# Patient Record
Sex: FEMALE | Race: WHITE | Employment: UNEMPLOYED | RURAL
[De-identification: names, ages, dates, MRNs, and addresses within clinical notes are randomized per-mention and may not be internally consistent; named-entity substitution may affect disease eponyms.]

---

## 2017-05-01 ENCOUNTER — HOSPITAL ENCOUNTER (OUTPATIENT)
Dept: GENERAL RADIOLOGY | Age: 67
Discharge: HOME | End: 2017-05-01
Attending: NURSE PRACTITIONER | Admitting: NURSE PRACTITIONER

## 2017-05-01 DIAGNOSIS — M79.609 PAIN IN EXTREMITY: ICD-10-CM

## 2017-05-01 DIAGNOSIS — M79.604 PAIN OF RIGHT LOWER EXTREMITY: ICD-10-CM

## 2018-05-08 ENCOUNTER — HOSPITAL ENCOUNTER (OUTPATIENT)
Dept: OTHER | Age: 68
Discharge: HOME | End: 2018-05-08
Attending: NURSE PRACTITIONER | Admitting: NURSE PRACTITIONER

## 2018-05-08 DIAGNOSIS — J20.9 ACUTE BRONCHITIS, UNSPECIFIED ORGANISM: ICD-10-CM

## 2020-02-12 ENCOUNTER — APPOINTMENT (OUTPATIENT)
Dept: ULTRASOUND IMAGING | Facility: HOSPITAL | Age: 70
End: 2020-02-12

## 2020-02-12 ENCOUNTER — HOSPITAL ENCOUNTER (EMERGENCY)
Facility: HOSPITAL | Age: 70
Discharge: HOME | End: 2020-02-12
Attending: EMERGENCY MEDICINE | Admitting: EMERGENCY MEDICINE

## 2020-02-12 ENCOUNTER — HOSPITAL ENCOUNTER (INPATIENT)
Facility: HOSPITAL | Age: 70
LOS: 3 days | Discharge: NURSING, REHAB OR LONG-TERM CARE | DRG: 690 | End: 2020-02-15
Attending: EMERGENCY MEDICINE | Admitting: INTERNAL MEDICINE
Payer: MEDICAID

## 2020-02-12 VITALS
TEMPERATURE: 97.7 F | HEART RATE: 87 BPM | OXYGEN SATURATION: 98 % | SYSTOLIC BLOOD PRESSURE: 147 MMHG | BODY MASS INDEX: 50.02 KG/M2 | DIASTOLIC BLOOD PRESSURE: 85 MMHG | HEIGHT: 64 IN | RESPIRATION RATE: 24 BRPM | WEIGHT: 293 LBS

## 2020-02-12 DIAGNOSIS — M79.89 LEG SWELLING: ICD-10-CM

## 2020-02-12 DIAGNOSIS — N30.00 ACUTE CYSTITIS WITHOUT HEMATURIA: Primary | ICD-10-CM

## 2020-02-12 PROBLEM — N39.0 UTI (URINARY TRACT INFECTION): Status: ACTIVE | Noted: 2020-02-12

## 2020-02-12 LAB
A/G RATIO: 0.6 (ref 0.8–2)
ALBUMIN SERPL-MCNC: 3.2 G/DL (ref 3.4–4.8)
ALBUMIN SERPL-MCNC: 3.4 G/DL (ref 3.5–5.2)
ALBUMIN/GLOB SERPL: 0.6 G/DL
ALP BLD-CCNC: 95 U/L (ref 25–100)
ALP SERPL-CCNC: 94 U/L (ref 39–117)
ALT SERPL W P-5'-P-CCNC: 14 U/L (ref 1–33)
ALT SERPL-CCNC: 14 U/L (ref 4–36)
ANION GAP SERPL CALCULATED.3IONS-SCNC: 10 MMOL/L (ref 3–16)
ANION GAP SERPL CALCULATED.3IONS-SCNC: 11.2 MMOL/L (ref 5–15)
AST SERPL-CCNC: 24 U/L (ref 1–32)
AST SERPL-CCNC: 38 U/L (ref 8–33)
BACTERIA UR QL AUTO: ABNORMAL /HPF
BACTERIA: ABNORMAL /HPF
BASOPHILS # BLD AUTO: 0.07 10*3/MM3 (ref 0–0.2)
BASOPHILS NFR BLD AUTO: 0.7 % (ref 0–1.5)
BILIRUB SERPL-MCNC: 0.4 MG/DL (ref 0.2–1.2)
BILIRUB SERPL-MCNC: 0.4 MG/DL (ref 0.3–1.2)
BILIRUB UR QL STRIP: NEGATIVE
BILIRUBIN URINE: NEGATIVE
BLOOD, URINE: ABNORMAL
BUN BLD-MCNC: 30 MG/DL (ref 8–23)
BUN BLDV-MCNC: 29 MG/DL (ref 6–20)
BUN/CREAT SERPL: 30.6 (ref 7–25)
CALCIUM SERPL-MCNC: 8.5 MG/DL (ref 8.5–10.5)
CALCIUM SPEC-SCNC: 9 MG/DL (ref 8.6–10.5)
CHLORIDE BLD-SCNC: 100 MMOL/L (ref 98–107)
CHLORIDE SERPL-SCNC: 101 MMOL/L (ref 98–107)
CLARITY UR: ABNORMAL
CLARITY: ABNORMAL
CO2 SERPL-SCNC: 25.8 MMOL/L (ref 22–29)
CO2: 27 MMOL/L (ref 20–30)
COLOR UR: YELLOW
COLOR: YELLOW
CREAT BLD-MCNC: 0.98 MG/DL (ref 0.57–1)
CREAT SERPL-MCNC: 1 MG/DL (ref 0.4–1.2)
DEPRECATED RDW RBC AUTO: 49.9 FL (ref 37–54)
EOSINOPHIL # BLD AUTO: 0.36 10*3/MM3 (ref 0–0.4)
EOSINOPHIL NFR BLD AUTO: 3.5 % (ref 0.3–6.2)
EPITHELIAL CELLS, UA: ABNORMAL /HPF
ERYTHROCYTE [DISTWIDTH] IN BLOOD BY AUTOMATED COUNT: 14.9 % (ref 12.3–15.4)
GFR AFRICAN AMERICAN: >59
GFR NON-AFRICAN AMERICAN: 55
GFR SERPL CREATININE-BSD FRML MDRD: 57 ML/MIN/1.73
GLOBULIN UR ELPH-MCNC: 5.5 GM/DL
GLOBULIN: 5.2 G/DL
GLUCOSE BLD-MCNC: 109 MG/DL (ref 74–106)
GLUCOSE BLD-MCNC: 133 MG/DL (ref 65–99)
GLUCOSE UR STRIP-MCNC: NEGATIVE MG/DL
GLUCOSE URINE: NEGATIVE MG/DL
HCT VFR BLD AUTO: 33.4 % (ref 34–46.6)
HCT VFR BLD CALC: 33.5 % (ref 37–47)
HEMOGLOBIN: 10.7 G/DL (ref 11.5–16.5)
HGB BLD-MCNC: 11 G/DL (ref 12–15.9)
HGB UR QL STRIP.AUTO: ABNORMAL
HYALINE CASTS UR QL AUTO: ABNORMAL /LPF
IMM GRANULOCYTES # BLD AUTO: 0.06 10*3/MM3 (ref 0–0.05)
IMM GRANULOCYTES NFR BLD AUTO: 0.6 % (ref 0–0.5)
KETONES UR QL STRIP: NEGATIVE
KETONES, URINE: NEGATIVE MG/DL
LEUKOCYTE ESTERASE UR QL STRIP.AUTO: ABNORMAL
LEUKOCYTE ESTERASE, URINE: ABNORMAL
LIPASE: 13 U/L (ref 5.6–51.3)
LYMPHOCYTES # BLD AUTO: 2.38 10*3/MM3 (ref 0.7–3.1)
LYMPHOCYTES NFR BLD AUTO: 23.2 % (ref 19.6–45.3)
MCH RBC QN AUTO: 29.7 PG (ref 27–32)
MCH RBC QN AUTO: 30.1 PG (ref 26.6–33)
MCHC RBC AUTO-ENTMCNC: 31.9 G/DL (ref 31–35)
MCHC RBC AUTO-ENTMCNC: 32.9 G/DL (ref 31.5–35.7)
MCV RBC AUTO: 91.3 FL (ref 79–97)
MCV RBC AUTO: 93.1 FL (ref 80–100)
MICROSCOPIC EXAMINATION: YES
MONOCYTES # BLD AUTO: 0.78 10*3/MM3 (ref 0.1–0.9)
MONOCYTES NFR BLD AUTO: 7.6 % (ref 5–12)
MUCUS: ABNORMAL /LPF
NEUTROPHILS # BLD AUTO: 6.62 10*3/MM3 (ref 1.7–7)
NEUTROPHILS NFR BLD AUTO: 64.4 % (ref 42.7–76)
NITRITE UR QL STRIP: POSITIVE
NITRITE, URINE: POSITIVE
NRBC BLD AUTO-RTO: 0 /100 WBC (ref 0–0.2)
NT-PROBNP SERPL-MCNC: 39.8 PG/ML (ref 5–900)
PDW BLD-RTO: 15.3 % (ref 11–16)
PH UA: 5.5 (ref 5–8)
PH UR STRIP.AUTO: <=5 [PH] (ref 5–8)
PLATELET # BLD AUTO: 273 10*3/MM3 (ref 140–450)
PLATELET # BLD: 267 K/UL (ref 150–400)
PMV BLD AUTO: 8.2 FL (ref 6–12)
PMV BLD AUTO: 8.3 FL (ref 6–10)
POTASSIUM BLD-SCNC: 3.9 MMOL/L (ref 3.5–5.2)
POTASSIUM REFLEX MAGNESIUM: 3.7 MMOL/L (ref 3.4–5.1)
PROT SERPL-MCNC: 8.9 G/DL (ref 6–8.5)
PROT UR QL STRIP: ABNORMAL
PROTEIN UA: NEGATIVE MG/DL
RBC # BLD AUTO: 3.66 10*6/MM3 (ref 3.77–5.28)
RBC # BLD: 3.6 M/UL (ref 3.8–5.8)
RBC # UR: ABNORMAL /HPF
RBC UA: ABNORMAL /HPF (ref 0–2)
REF LAB TEST METHOD: ABNORMAL
SODIUM BLD-SCNC: 137 MMOL/L (ref 136–145)
SODIUM BLD-SCNC: 138 MMOL/L (ref 136–145)
SP GR UR STRIP: 1.01 (ref 1–1.03)
SPECIFIC GRAVITY UA: 1.01 (ref 1–1.03)
SQUAMOUS #/AREA URNS HPF: ABNORMAL /HPF
TOTAL PROTEIN: 8.4 G/DL (ref 6.4–8.3)
TROPONIN T SERPL-MCNC: <0.01 NG/ML (ref 0–0.03)
URINE REFLEX TO CULTURE: YES
URINE TYPE: ABNORMAL
UROBILINOGEN UR QL STRIP: ABNORMAL
UROBILINOGEN, URINE: 0.2 E.U./DL
WBC # BLD: 12.7 K/UL (ref 4–11)
WBC NRBC COR # BLD: 10.27 10*3/MM3 (ref 3.4–10.8)
WBC UA: ABNORMAL /HPF (ref 0–5)
WBC UR QL AUTO: ABNORMAL /HPF

## 2020-02-12 PROCEDURE — 93970 EXTREMITY STUDY: CPT

## 2020-02-12 PROCEDURE — 2580000003 HC RX 258: Performed by: EMERGENCY MEDICINE

## 2020-02-12 PROCEDURE — 80053 COMPREHEN METABOLIC PANEL: CPT | Performed by: PHYSICIAN ASSISTANT

## 2020-02-12 PROCEDURE — 99284 EMERGENCY DEPT VISIT MOD MDM: CPT

## 2020-02-12 PROCEDURE — 87186 SC STD MICRODIL/AGAR DIL: CPT | Performed by: EMERGENCY MEDICINE

## 2020-02-12 PROCEDURE — 36415 COLL VENOUS BLD VENIPUNCTURE: CPT

## 2020-02-12 PROCEDURE — 87077 CULTURE AEROBIC IDENTIFY: CPT

## 2020-02-12 PROCEDURE — 85025 COMPLETE CBC W/AUTO DIFF WBC: CPT | Performed by: PHYSICIAN ASSISTANT

## 2020-02-12 PROCEDURE — 6360000002 HC RX W HCPCS: Performed by: EMERGENCY MEDICINE

## 2020-02-12 PROCEDURE — 80053 COMPREHEN METABOLIC PANEL: CPT

## 2020-02-12 PROCEDURE — 1200000000 HC SEMI PRIVATE

## 2020-02-12 PROCEDURE — 87086 URINE CULTURE/COLONY COUNT: CPT

## 2020-02-12 PROCEDURE — 83690 ASSAY OF LIPASE: CPT

## 2020-02-12 PROCEDURE — 80061 LIPID PANEL: CPT

## 2020-02-12 PROCEDURE — 83036 HEMOGLOBIN GLYCOSYLATED A1C: CPT

## 2020-02-12 PROCEDURE — 87086 URINE CULTURE/COLONY COUNT: CPT | Performed by: EMERGENCY MEDICINE

## 2020-02-12 PROCEDURE — 51702 INSERT TEMP BLADDER CATH: CPT

## 2020-02-12 PROCEDURE — 96365 THER/PROPH/DIAG IV INF INIT: CPT

## 2020-02-12 PROCEDURE — 81001 URINALYSIS AUTO W/SCOPE: CPT | Performed by: EMERGENCY MEDICINE

## 2020-02-12 PROCEDURE — 83540 ASSAY OF IRON: CPT

## 2020-02-12 PROCEDURE — 83550 IRON BINDING TEST: CPT

## 2020-02-12 PROCEDURE — 85027 COMPLETE CBC AUTOMATED: CPT

## 2020-02-12 PROCEDURE — 81001 URINALYSIS AUTO W/SCOPE: CPT

## 2020-02-12 PROCEDURE — 84484 ASSAY OF TROPONIN QUANT: CPT | Performed by: PHYSICIAN ASSISTANT

## 2020-02-12 PROCEDURE — 83880 ASSAY OF NATRIURETIC PEPTIDE: CPT | Performed by: PHYSICIAN ASSISTANT

## 2020-02-12 PROCEDURE — 99285 EMERGENCY DEPT VISIT HI MDM: CPT

## 2020-02-12 PROCEDURE — 6370000000 HC RX 637 (ALT 250 FOR IP): Performed by: EMERGENCY MEDICINE

## 2020-02-12 PROCEDURE — 82746 ASSAY OF FOLIC ACID SERUM: CPT

## 2020-02-12 PROCEDURE — 93005 ELECTROCARDIOGRAM TRACING: CPT | Performed by: PHYSICIAN ASSISTANT

## 2020-02-12 PROCEDURE — 80307 DRUG TEST PRSMV CHEM ANLYZR: CPT

## 2020-02-12 PROCEDURE — 82607 VITAMIN B-12: CPT

## 2020-02-12 PROCEDURE — 87077 CULTURE AEROBIC IDENTIFY: CPT | Performed by: EMERGENCY MEDICINE

## 2020-02-12 PROCEDURE — 80185 ASSAY OF PHENYTOIN TOTAL: CPT

## 2020-02-12 PROCEDURE — 84443 ASSAY THYROID STIM HORMONE: CPT

## 2020-02-12 PROCEDURE — 87186 SC STD MICRODIL/AGAR DIL: CPT

## 2020-02-12 RX ORDER — ACETAMINOPHEN 325 MG/1
650 TABLET ORAL ONCE
Status: COMPLETED | OUTPATIENT
Start: 2020-02-13 | End: 2020-02-12

## 2020-02-12 RX ORDER — CARVEDILOL 6.25 MG/1
6.25 TABLET ORAL 2 TIMES DAILY WITH MEALS
Status: ON HOLD | COMMUNITY
End: 2020-03-13 | Stop reason: SDUPTHER

## 2020-02-12 RX ORDER — CEFUROXIME AXETIL 500 MG/1
500 TABLET ORAL 2 TIMES DAILY
Qty: 14 TABLET | Refills: 0 | Status: SHIPPED | OUTPATIENT
Start: 2020-02-12 | End: 2020-02-19

## 2020-02-12 RX ORDER — LEVOTHYROXINE SODIUM 112 UG/1
112 TABLET ORAL DAILY
Status: ON HOLD | COMMUNITY
End: 2020-03-13 | Stop reason: SDUPTHER

## 2020-02-12 RX ORDER — IBUPROFEN 600 MG/1
600 TABLET ORAL ONCE
Status: COMPLETED | OUTPATIENT
Start: 2020-02-12 | End: 2020-02-12

## 2020-02-12 RX ORDER — POTASSIUM CHLORIDE 1.5 G/1.77G
20 POWDER, FOR SOLUTION ORAL DAILY
Status: ON HOLD | COMMUNITY
End: 2020-03-13 | Stop reason: SDUPTHER

## 2020-02-12 RX ORDER — FUROSEMIDE 40 MG/1
40 TABLET ORAL DAILY
Status: ON HOLD | COMMUNITY
End: 2020-03-13 | Stop reason: SDUPTHER

## 2020-02-12 RX ORDER — RANITIDINE 150 MG/1
150 TABLET ORAL 2 TIMES DAILY
Status: ON HOLD | COMMUNITY
End: 2020-03-13 | Stop reason: SDUPTHER

## 2020-02-12 RX ORDER — PHENYTOIN SODIUM 100 MG/1
500 CAPSULE, EXTENDED RELEASE ORAL NIGHTLY
Status: ON HOLD | COMMUNITY
End: 2020-03-13 | Stop reason: HOSPADM

## 2020-02-12 RX ORDER — LOSARTAN POTASSIUM 100 MG/1
100 TABLET ORAL DAILY
Status: ON HOLD | COMMUNITY
End: 2020-03-13 | Stop reason: SDUPTHER

## 2020-02-12 RX ORDER — LORATADINE 10 MG/1
10 TABLET ORAL DAILY
COMMUNITY

## 2020-02-12 RX ORDER — ACETAMINOPHEN 325 MG/1
650 TABLET ORAL ONCE
Status: COMPLETED | OUTPATIENT
Start: 2020-02-12 | End: 2020-02-12

## 2020-02-12 RX ADMIN — ACETAMINOPHEN 650 MG: 325 TABLET, FILM COATED ORAL at 23:52

## 2020-02-12 RX ADMIN — ACETAMINOPHEN 650 MG: 325 TABLET, FILM COATED ORAL at 11:40

## 2020-02-12 RX ADMIN — IBUPROFEN 600 MG: 600 TABLET ORAL at 14:07

## 2020-02-12 RX ADMIN — CEFTRIAXONE 1 G: 1 INJECTION, POWDER, FOR SOLUTION INTRAMUSCULAR; INTRAVENOUS at 22:15

## 2020-02-12 ASSESSMENT — PAIN SCALES - GENERAL: PAINLEVEL_OUTOF10: 7

## 2020-02-12 NOTE — ED NOTES
Attempted to stand patient to assist to the wheelchair.  With 5 people assisting, unable to help patient into wheelchair.  Pt reports pain to her feet and being unable to stand due to leg weakness.  Mustapha WANG notified.  Will send via EMS.     Cole Alegre RN  02/12/20 0964

## 2020-02-12 NOTE — ED PROVIDER NOTES
"Subjective   69-year-old female presents via EMS for leg swelling and leg pain.  She is here with her family who reports that she is progressively developed more swelling in her leg, to the point that it is affecting her ambulating.  They report that she ambulated very little recently, mostly to and from the bathroom.  They state is been a long time that she is walking any significant distance however is progressively gotten worse due to the swelling in her legs and pain.  She states that she has had swelling in her legs for \"some time\" but she feels like it is gotten worse.      History provided by:  Patient and relative   used: No        Review of Systems   Musculoskeletal:        Bilateral leg swelling and pain   All other systems reviewed and are negative.      History reviewed. No pertinent past medical history.    Allergies   Allergen Reactions   • Contrast Dye Anaphylaxis   • Codeine Unknown - High Severity       History reviewed. No pertinent surgical history.    History reviewed. No pertinent family history.    Social History     Socioeconomic History   • Marital status:      Spouse name: Not on file   • Number of children: Not on file   • Years of education: Not on file   • Highest education level: Not on file   Tobacco Use   • Smoking status: Never Smoker   Substance and Sexual Activity   • Alcohol use: Never     Frequency: Never   • Drug use: Never           Objective   Physical Exam   Constitutional: She is oriented to person, place, and time.   Morbidly obese   HENT:   Head: Atraumatic.   Eyes: EOM are normal.   Neck: Normal range of motion. Neck supple.   Cardiovascular: Normal rate and regular rhythm.   Pulmonary/Chest: Effort normal.   Abdominal: Soft.   Musculoskeletal: Normal range of motion.   Neurological: She is alert and oriented to person, place, and time. She has normal reflexes.   Skin:   Bilateral lower leg swelling, with chronic skin changes from venous " insufficiency, there is also chronic leg wounds.   Psychiatric: She has a normal mood and affect.   Nursing note and vitals reviewed.      Procedures           ED Course  ED Course as of Feb 13 1258   Wed Feb 12, 2020   1208 EKG interpreted by me reveals sinus rhythm at a rate of 86.  No ectopy no ischemic changes    [PF]   1244 Discussed the case with patient's  and wife with her permission, they state that there are several issues at home including mobility, hygiene issues, and generalized care of the patient at home.  We have decided to contact  at this point.    [CS]   1350 Equipment and home health set up for patient    [CS]      ED Course User Index  [CS] Mustapha Berry Jr., PA-C  [PF] Brant Osullivan, DO                                           MDM  Number of Diagnoses or Management Options  Acute cystitis without hematuria: new and requires workup  Leg swelling: new and requires workup     Amount and/or Complexity of Data Reviewed  Clinical lab tests: reviewed  Tests in the radiology section of CPT®: reviewed  Discuss the patient with other providers: yes    Risk of Complications, Morbidity, and/or Mortality  Presenting problems: minimal  Diagnostic procedures: minimal  Management options: minimal    Patient Progress  Patient progress: stable      Final diagnoses:   Acute cystitis without hematuria   Leg swelling            Mustapha Berry Jr., PA-C  02/12/20 1234       Mustapha Berry Jr., PA-C  02/13/20 1258

## 2020-02-12 NOTE — PROGRESS NOTES
Discharge Planning Assessment  [facility]     Patient Name: Phyllis Chen  MRN: [id number]  Today's Date: 2/12/2020    Admit Date: 2/12/2020    Discharge Needs Assessment     Row Name 02/12/20 1430       Living Environment    Lives With  child(lena), adult;grandchild(lena);spouse    Name(s) of Who Lives With Patient  , Ramón, son Ramón, daughter son in law and grandkids    Unique Family Situation  no living will or POA    Primary Care Provided by  self;spouse/significant other;child(lena)    Provides Primary Care For  no one    Caregiving Concerns  several family members assist her.    Family Caregiver if Needed  child(lena), adult;spouse       Resource/Environmental Concerns    Resource/Environmental Concerns  other (see comments)    Transportation Concerns  car, none       Transition Planning    Patient/Family Anticipates Transition to  home with family;home with help/services;other (see comments)    Patient/Family Anticipated Services at Transition  home health care;other (see comments);durable medical equipment       Discharge Needs Assessment    Readmission Within the Last 30 Days  no previous admission in last 30 days    Concerns to be Addressed  discharge planning    Equipment Currently Used at Home  cane, quad    Equipment Needed After Discharge  commode;walker, standard    Outpatient/Agency/Support Group Needs  homecare agency;other (see comments)    Discharge Facility/Level of Care Needs  other (see comments)    Provided post acute provider list?  N/A    N/A Provider List Comment  referred to PCP for home health, provided preference list.     Patient's Choice of Community Agency(s)  follow up with PCP.    Discharge Coordination/Progress  home with family, contacted PCP for home health, bsc, walker provided        Discharge Plan     Row Name 02/12/20 1436       Plan    Plan  Consult for more assistance at home. Spoke to pt., her son Ramón and her  Ramón. Verified address and PCP. Antoinette Camara  Gross at [facility] in [place]. Has no home health, no o2, only has cane. No cpap.Provided pt. with community resource book to include rehab, home health, dme and sitter list. Pt. agrees to let SW call PCP to request they see her for home health and bsc. ED medical provider wrote for walker. Family agreed to [place] bringing her walker. It was deliveredto room. Son gets pt. meds and gives them to her. Daughter cooks for her. No other needs voiced. They declined waiver services for bathing. Declined rehab referrals. Family feels they can get pt. in car.     Plan Comments  Nurse and medical provider made aware of plan. Family has SW contact information if needed.        Destination      Coordination has not been started for this encounter.      Durable Medical Equipment      Coordination has not been started for this encounter.      Dialysis/Infusion      Coordination has not been started for this encounter.      Home Medical Care      Coordination has not been started for this encounter.      Therapy      Coordination has not been started for this encounter.      Community Resources      Coordination has not been started for this encounter.          Demographic Summary     Row Name 02/12/20 1438       General Information    Admission Type  other (see comments)    Arrived From  home    Expected Length of Stay (LOS)  3    Referral Source  emergency department    Reason for Consult  discharge planning    Preferred Language  English       Contact Information    Contact Information Comments  son or         Functional Status     Row Name 02/12/20 2940       Functional Status, IADL    Medications  other (see comments)    Meal Preparation  other (see comments)    Housekeeping  other (see comments)    Laundry  other (see comments)    Shopping  other (see comments)    IADL Comments  family and pt. state she is having trouble getting up       Employment/    Employment Status  homemaker;other (see comments)         Psychosocial    No documentation.       Abuse/Neglect    No documentation.       Legal    No documentation.       Substance Abuse    No documentation.       Patient Forms    No documentation.           CALVIN BeanW

## 2020-02-13 PROBLEM — S31.809A WOUND OF BUTTOCK, INITIAL ENCOUNTER: Status: ACTIVE | Noted: 2020-02-13

## 2020-02-13 PROBLEM — R53.81 DECLINING FUNCTIONAL STATUS: Status: ACTIVE | Noted: 2020-02-13

## 2020-02-13 PROBLEM — G40.909 SEIZURE DISORDER (HCC): Status: ACTIVE | Noted: 2020-02-13

## 2020-02-13 PROBLEM — I89.0 LYMPHEDEMA OF BOTH LOWER EXTREMITIES: Status: ACTIVE | Noted: 2020-02-13

## 2020-02-13 PROBLEM — E66.01 MORBID OBESITY (HCC): Status: ACTIVE | Noted: 2020-02-13

## 2020-02-13 LAB
AMPHETAMINE SCREEN, URINE: ABNORMAL
ANION GAP SERPL CALCULATED.3IONS-SCNC: 10 MMOL/L (ref 3–16)
BARBITURATE SCREEN URINE: POSITIVE
BASOPHILS ABSOLUTE: 0.1 K/UL (ref 0–0.1)
BASOPHILS RELATIVE PERCENT: 0.6 %
BENZODIAZEPINE SCREEN, URINE: ABNORMAL
BUN BLDV-MCNC: 24 MG/DL (ref 6–20)
CALCIUM SERPL-MCNC: 8.3 MG/DL (ref 8.5–10.5)
CANNABINOID SCREEN URINE: ABNORMAL
CHLORIDE BLD-SCNC: 104 MMOL/L (ref 98–107)
CHOLESTEROL, TOTAL: 140 MG/DL (ref 0–200)
CO2: 26 MMOL/L (ref 20–30)
COCAINE METABOLITE SCREEN URINE: ABNORMAL
CREAT SERPL-MCNC: 0.9 MG/DL (ref 0.4–1.2)
EOSINOPHILS ABSOLUTE: 0.4 K/UL (ref 0–0.4)
EOSINOPHILS RELATIVE PERCENT: 3.7 %
FOLATE: 11.55 NG/ML
GFR AFRICAN AMERICAN: >59
GFR NON-AFRICAN AMERICAN: >60
GLUCOSE BLD-MCNC: 141 MG/DL (ref 74–106)
HBA1C MFR BLD: 5.8 %
HCT VFR BLD CALC: 31.1 % (ref 37–47)
HDLC SERPL-MCNC: 47 MG/DL (ref 40–60)
HEMOGLOBIN: 9.7 G/DL (ref 11.5–16.5)
IMMATURE GRANULOCYTES #: 0 K/UL
IMMATURE GRANULOCYTES %: 0.4 % (ref 0–5)
IRON SATURATION: 22 % (ref 15–50)
IRON: 54 UG/DL (ref 37–145)
LDL CHOLESTEROL CALCULATED: 61 MG/DL
LYMPHOCYTES ABSOLUTE: 2.1 K/UL (ref 1.5–4)
LYMPHOCYTES RELATIVE PERCENT: 22 %
Lab: ABNORMAL
MCH RBC QN AUTO: 29.7 PG (ref 27–32)
MCHC RBC AUTO-ENTMCNC: 31.2 G/DL (ref 31–35)
MCV RBC AUTO: 95.1 FL (ref 80–100)
METHADONE SCREEN, URINE: ABNORMAL
METHAMPHETAMINE, URINE: ABNORMAL
MONOCYTES ABSOLUTE: 0.7 K/UL (ref 0.2–0.8)
MONOCYTES RELATIVE PERCENT: 7.6 %
NEUTROPHILS ABSOLUTE: 6.3 K/UL (ref 2–7.5)
NEUTROPHILS RELATIVE PERCENT: 65.7 %
OPIATE SCREEN URINE: ABNORMAL
PDW BLD-RTO: 15.4 % (ref 11–16)
PHENCYCLIDINE SCREEN URINE: ABNORMAL
PHENYTOIN DOSE AMOUNT: ABNORMAL
PHENYTOIN LEVEL: 2.1 UG/ML (ref 10–20)
PLATELET # BLD: 166 K/UL (ref 150–400)
PMV BLD AUTO: 9.7 FL (ref 6–10)
POTASSIUM REFLEX MAGNESIUM: 3.8 MMOL/L (ref 3.4–5.1)
PROPOXYPHENE SCREEN, URINE: ABNORMAL
RBC # BLD: 3.27 M/UL (ref 3.8–5.8)
SODIUM BLD-SCNC: 140 MMOL/L (ref 136–145)
TOTAL IRON BINDING CAPACITY: 245 UG/DL (ref 250–450)
TRICYCLIC, URINE: ABNORMAL
TRIGL SERPL-MCNC: 162 MG/DL (ref 0–249)
TSH REFLEX: 3.63 UIU/ML (ref 0.35–5.5)
UR OXYCODONE RAPID SCREEN: ABNORMAL
VITAMIN B-12: 533 PG/ML (ref 211–911)
VLDLC SERPL CALC-MCNC: 32 MG/DL
WBC # BLD: 9.6 K/UL (ref 4–11)

## 2020-02-13 PROCEDURE — 6370000000 HC RX 637 (ALT 250 FOR IP)

## 2020-02-13 PROCEDURE — 96366 THER/PROPH/DIAG IV INF ADDON: CPT

## 2020-02-13 PROCEDURE — 6370000000 HC RX 637 (ALT 250 FOR IP): Performed by: PEDIATRICS

## 2020-02-13 PROCEDURE — G0378 HOSPITAL OBSERVATION PER HR: HCPCS

## 2020-02-13 PROCEDURE — 2580000003 HC RX 258: Performed by: PEDIATRICS

## 2020-02-13 PROCEDURE — 85025 COMPLETE CBC W/AUTO DIFF WBC: CPT

## 2020-02-13 PROCEDURE — 97162 PT EVAL MOD COMPLEX 30 MIN: CPT

## 2020-02-13 PROCEDURE — 97530 THERAPEUTIC ACTIVITIES: CPT

## 2020-02-13 PROCEDURE — 6370000000 HC RX 637 (ALT 250 FOR IP): Performed by: PHYSICIAN ASSISTANT

## 2020-02-13 PROCEDURE — 97802 MEDICAL NUTRITION INDIV IN: CPT

## 2020-02-13 PROCEDURE — 99222 1ST HOSP IP/OBS MODERATE 55: CPT | Performed by: INTERNAL MEDICINE

## 2020-02-13 PROCEDURE — 6360000002 HC RX W HCPCS: Performed by: PEDIATRICS

## 2020-02-13 PROCEDURE — 80048 BASIC METABOLIC PNL TOTAL CA: CPT

## 2020-02-13 PROCEDURE — 36415 COLL VENOUS BLD VENIPUNCTURE: CPT

## 2020-02-13 PROCEDURE — 1200000000 HC SEMI PRIVATE

## 2020-02-13 PROCEDURE — 97165 OT EVAL LOW COMPLEX 30 MIN: CPT

## 2020-02-13 RX ORDER — SODIUM CHLORIDE 0.9 % (FLUSH) 0.9 %
10 SYRINGE (ML) INJECTION EVERY 12 HOURS SCHEDULED
Status: DISCONTINUED | OUTPATIENT
Start: 2020-02-13 | End: 2020-02-15 | Stop reason: HOSPADM

## 2020-02-13 RX ORDER — CETIRIZINE HYDROCHLORIDE 10 MG/1
10 TABLET ORAL DAILY
Status: DISCONTINUED | OUTPATIENT
Start: 2020-02-13 | End: 2020-02-15 | Stop reason: HOSPADM

## 2020-02-13 RX ORDER — FUROSEMIDE 40 MG/1
40 TABLET ORAL DAILY
Status: DISCONTINUED | OUTPATIENT
Start: 2020-02-13 | End: 2020-02-15 | Stop reason: HOSPADM

## 2020-02-13 RX ORDER — POLYETHYLENE GLYCOL 3350 17 G/17G
17 POWDER, FOR SOLUTION ORAL DAILY PRN
Status: DISCONTINUED | OUTPATIENT
Start: 2020-02-13 | End: 2020-02-15 | Stop reason: HOSPADM

## 2020-02-13 RX ORDER — CARVEDILOL 6.25 MG/1
6.25 TABLET ORAL 2 TIMES DAILY WITH MEALS
Status: DISCONTINUED | OUTPATIENT
Start: 2020-02-13 | End: 2020-02-15 | Stop reason: HOSPADM

## 2020-02-13 RX ORDER — SODIUM CHLORIDE 0.9 % (FLUSH) 0.9 %
10 SYRINGE (ML) INJECTION PRN
Status: DISCONTINUED | OUTPATIENT
Start: 2020-02-13 | End: 2020-02-15 | Stop reason: HOSPADM

## 2020-02-13 RX ORDER — FAMOTIDINE 20 MG/1
40 TABLET, FILM COATED ORAL DAILY
Status: DISCONTINUED | OUTPATIENT
Start: 2020-02-13 | End: 2020-02-15 | Stop reason: HOSPADM

## 2020-02-13 RX ORDER — LOSARTAN POTASSIUM 50 MG/1
100 TABLET ORAL DAILY
Status: DISCONTINUED | OUTPATIENT
Start: 2020-02-13 | End: 2020-02-15 | Stop reason: HOSPADM

## 2020-02-13 RX ORDER — FLUTICASONE PROPIONATE 50 MCG
2 SPRAY, SUSPENSION (ML) NASAL DAILY
COMMUNITY

## 2020-02-13 RX ORDER — LEVOTHYROXINE SODIUM 112 UG/1
112 TABLET ORAL DAILY
Status: DISCONTINUED | OUTPATIENT
Start: 2020-02-13 | End: 2020-02-15 | Stop reason: HOSPADM

## 2020-02-13 RX ORDER — ACETAMINOPHEN 325 MG/1
650 TABLET ORAL EVERY 4 HOURS PRN
Status: DISCONTINUED | OUTPATIENT
Start: 2020-02-13 | End: 2020-02-15 | Stop reason: HOSPADM

## 2020-02-13 RX ORDER — CEFUROXIME AXETIL 500 MG/1
500 TABLET ORAL 2 TIMES DAILY
Status: ON HOLD | COMMUNITY
End: 2020-02-15 | Stop reason: HOSPADM

## 2020-02-13 RX ORDER — GABAPENTIN 100 MG/1
200 CAPSULE ORAL 2 TIMES DAILY
Status: DISCONTINUED | OUTPATIENT
Start: 2020-02-13 | End: 2020-02-15

## 2020-02-13 RX ORDER — ZINC OXIDE AND DIMETHICONE 120; 10 MG/G; MG/G
CREAM TOPICAL
Status: COMPLETED
Start: 2020-02-13 | End: 2020-02-13

## 2020-02-13 RX ORDER — LEVOTHYROXINE SODIUM 112 UG/1
TABLET ORAL
Status: COMPLETED
Start: 2020-02-13 | End: 2020-02-13

## 2020-02-13 RX ORDER — PHENYTOIN SODIUM 100 MG/1
300 CAPSULE, EXTENDED RELEASE ORAL NIGHTLY
Status: DISCONTINUED | OUTPATIENT
Start: 2020-02-13 | End: 2020-02-14

## 2020-02-13 RX ADMIN — LEVOTHYROXINE SODIUM 112 MCG: 112 TABLET ORAL at 05:12

## 2020-02-13 RX ADMIN — CETIRIZINE HYDROCHLORIDE 10 MG: 10 TABLET, FILM COATED ORAL at 09:10

## 2020-02-13 RX ADMIN — CARVEDILOL 6.25 MG: 6.25 TABLET, FILM COATED ORAL at 17:25

## 2020-02-13 RX ADMIN — PHENYTOIN SODIUM 300 MG: 100 CAPSULE ORAL at 20:17

## 2020-02-13 RX ADMIN — DICLOFENAC 2 G: 10 GEL TOPICAL at 13:29

## 2020-02-13 RX ADMIN — DICLOFENAC 2 G: 10 GEL TOPICAL at 20:26

## 2020-02-13 RX ADMIN — GABAPENTIN 200 MG: 100 CAPSULE ORAL at 20:18

## 2020-02-13 RX ADMIN — PHENYTOIN SODIUM 300 MG: 100 CAPSULE ORAL at 01:08

## 2020-02-13 RX ADMIN — CEFTRIAXONE 1 G: 1 INJECTION, POWDER, FOR SOLUTION INTRAMUSCULAR; INTRAVENOUS at 20:16

## 2020-02-13 RX ADMIN — GABAPENTIN 200 MG: 100 CAPSULE ORAL at 13:29

## 2020-02-13 RX ADMIN — NYSTATIN 500000 UNITS: 100000 SUSPENSION ORAL at 20:18

## 2020-02-13 RX ADMIN — LOSARTAN POTASSIUM 100 MG: 50 TABLET ORAL at 09:10

## 2020-02-13 RX ADMIN — SODIUM CHLORIDE, PRESERVATIVE FREE 10 ML: 5 INJECTION INTRAVENOUS at 20:18

## 2020-02-13 RX ADMIN — NYSTATIN 500000 UNITS: 100000 SUSPENSION ORAL at 13:29

## 2020-02-13 RX ADMIN — NYSTATIN 500000 UNITS: 100000 SUSPENSION ORAL at 17:25

## 2020-02-13 RX ADMIN — ZINC OXIDE AND DIMETHICONE: 120; 10 CREAM TOPICAL at 05:12

## 2020-02-13 RX ADMIN — CARVEDILOL 6.25 MG: 6.25 TABLET, FILM COATED ORAL at 09:11

## 2020-02-13 RX ADMIN — FAMOTIDINE 40 MG: 20 TABLET ORAL at 09:10

## 2020-02-13 RX ADMIN — FUROSEMIDE 40 MG: 40 TABLET ORAL at 09:10

## 2020-02-13 RX ADMIN — POTASSIUM BICARBONATE 20 MEQ: 391 TABLET, EFFERVESCENT ORAL at 09:10

## 2020-02-13 SDOH — HEALTH STABILITY: MENTAL HEALTH: HOW OFTEN DO YOU HAVE A DRINK CONTAINING ALCOHOL?: NEVER

## 2020-02-13 ASSESSMENT — PAIN SCALES - GENERAL
PAINLEVEL_OUTOF10: 7
PAINLEVEL_OUTOF10: 7

## 2020-02-13 ASSESSMENT — PAIN DESCRIPTION - LOCATION: LOCATION: FOOT;THROAT

## 2020-02-13 NOTE — PROGRESS NOTES
She states she was d/c home and walked some at home but had severe difficulty. She has had a fall at home recently. Pain Screening  Patient Currently in Pain: Yes  Pain Assessment  Pain Assessment: 0-10  Pain Level: 7  Pain Location: Leg;Throat; Foot  Vital Signs  Patient Currently in Pain: Yes       Orientation  Orientation  Overall Orientation Status: Within Normal Limits     Social/Functional History  Social/Functional History  Lives With: Spouse(w/ son and daughter)  Type of Home: Trailer(double wide trailer)  Home Layout: One level  Home Access: Ramped entrance  Bathroom Shower/Tub: Tub/Shower unit(sponge bathes, cannot get in shower)  Bathroom Toilet: Handicap height  Home Equipment: Cane, Standard walker  ADL Assistance: Needs assistance((was able to but cannot now.    assists with bathing)  Homemaking Assistance: Needs assistance  Homemaking Responsibilities: No  Ambulation Assistance: Needs assistance  Transfer Assistance: Needs assistance  Active : No  Leisure & Hobbies: play games    Objective     Observation/Palpation  Observation: Pt lying in bed, morbidly obese, c/o pain in feet     AROM RLE (degrees)  RLE AROM: Exceptions  RLE General AROM: limited ROM due to morbid obesity  AROM LLE (degrees)  LLE AROM : Exceptions  LLE General AROM: limited ROM due to morbid obesity and pain     Strength RLE  Comment: 3-/5 grossly, assessed in bed due to pt not able to sit EOB due to pain  Strength LLE  Comment: 2+/5 grossly, assessed in bed due to pt not able to sit EOB due to pain        Bed mobility  Rolling to Left: Minimal assistance  Rolling to Right: Moderate assistance  Supine to Sit: Unable to assess  Sit to Supine: Unable to assess  Scooting: Unable to assess     Transfers  Sit to Stand: Unable to assess  Ambulation  Ambulation?: No            Plan   Plan  Times per week: 4-5 days per week  Plan weeks: 1  Current Treatment Recommendations: Strengthening, Gait Training, Patient/Caregiver

## 2020-02-13 NOTE — ED TRIAGE NOTES
Pt. went to  [facility] today and was dx'ed with a uti. Had rx.filled but has not taken any of her meds for uti.

## 2020-02-13 NOTE — H&P
rubs, no edema   GI:  Morbidly obese, Soft, nondistended, normal bowel sounds, nontender, no voluntary guarding  Musculoskeletal:  No cyanosis or obvious acute deformity. Moving all extremities. Decreased strength bilateral lower extremities . Bilateral Feet tender to palpation. Integument:  Warm and dry. Chronic stasis changes and lymphedema to lower extremities. No redness or bruising noted to lower extremities or feet. No wounds to heels or feet. Stage II wounds to buttocks. Neurologic:  Alert & oriented x 3, no apparent focal deficits noted   Psychiatric:  Speech and behavior appropriate         Lab Results   Component Value Date     02/13/2020    K 3.8 02/13/2020     02/13/2020    CO2 26 02/13/2020    BUN 24 (H) 02/13/2020    CREATININE 0.9 02/13/2020    GLUCOSE 141 (H) 02/13/2020    CALCIUM 8.3 (L) 02/13/2020    PROT 8.4 (H) 02/12/2020    LABALBU 3.2 (L) 02/12/2020    BILITOT 0.4 02/12/2020    ALKPHOS 95 02/12/2020    AST 38 (H) 02/12/2020    ALT 14 02/12/2020    LABGLOM >60 02/13/2020    GFRAA >59 02/13/2020    AGRATIO 0.6 (L) 02/12/2020    GLOB 5.2 02/12/2020           Lab Results   Component Value Date    WBC 9.6 02/13/2020    HGB 9.7 (L) 02/13/2020    HCT 31.1 (L) 02/13/2020    MCV 95.1 02/13/2020     02/13/2020     No orders to display         Assessment and Plan     Active Hospital Problems    Diagnosis Date Noted    Declining functional status [R53.81]  Patient reports gait difficulty and declining functional status. States she has not walked in months independently. Spending most of her time in bed. Complains of pain in lower extremities as below. Treat uti as below. Magnesium, b12, tsh, folate within normal limits. Morbid obesity complicates care. Chronic lymphedema complicates care. Pt/ot consutled. Case management consulted for dc planning assistance. Patient requesting discharge to nursing home for therapy and care.  02/13/2020    Lymphedema of both lower extremities [I89.0]  Patient complaining of pain in lower extremities which seems consistent with neuropathic pain. Start low dose gabapentin. Also add voltaren gel as to joints for arthritic pain. Encourage physical activity. Pt/ot. 02/13/2020    Wound of buttock, initial encounter [S31.700O]  Stage 2 wounds noted to buttocks. Nursing staff providing local care and encoruage offloading. 02/13/2020    UTI (urinary tract infection) [N39.0]  On rocephin. Follow urine culture. Neumann catheter in place at this time. Plan to discontinue prior to discharge. 02/12/2020   · Seizure disorder  Continue home regimen  · Morbid obesity  Complicates all aspects of care. bmi 64. Dietitian consulted. Encourage weight loss. Patient was seen and examined by Dr. Cash Morgan and plan of care reviewed.       Belinda Foley certifies per CMS regulation for 42 .15(a), that the patient may reasonably be expected to be discharged or transferred to a hospital within 96 hours after admission to [address] [place] [address]    Electronically signed by GARO Burton on 2/13/2020 at 3:56 PM

## 2020-02-13 NOTE — PROGRESS NOTES
Occupational Therapy   Occupational Therapy Initial Assessment  Date: 2020   Patient Name: Namrata Shelton  MRN: [id number]     : 1950    Date of Service: 2020    Discharge Recommendations:  Continue to assess pending progress       Assessment   Performance deficits / Impairments: Decreased functional mobility ; Decreased endurance;Decreased balance;Decreased ADL status  Assessment: Unable to complete full assessment secondary to pt c/o pain. Pt reports she is unable to sit at EOB on this date. Pt required DEP for toileting using bed pan and DEP for wing hygiene. Pt required increased level of anatoliy to roll to right side on this date. skin breakdown noted on buttocks. [place] barrier cream applied. Pt will benefit from skilled OT services to promote improved mobility and independence with ADL's. Prognosis: Good  Decision Making: Low Complexity  REQUIRES OT FOLLOW UP: Yes  Activity Tolerance  Activity Tolerance: Patient limited by pain           Patient Diagnosis(es): The primary encounter diagnosis was General weakness. Diagnoses of Acute UTI, Leukocytosis, unspecified type, and Obesity, unspecified classification, unspecified obesity type, unspecified whether serious comorbidity present were also pertinent to this visit. has a past medical history of Hypertension, Seizures ([address] and Thyroid disease. has a past surgical history that includes Cholecystectomy. Restrictions  Restrictions/Precautions  Restrictions/Precautions: Fall Risk, General Precautions  Required Braces or Orthoses?: No    Subjective   General  Chart Reviewed: Yes  Patient assessed for rehabilitation services?: Yes  Family / Caregiver Present: No  Referring Practitioner: Chela Chapman DO  Diagnosis: Generalized weakness, UTI,   Subjective  Subjective: Pt agreeable to OT services. Pt reports she came from [address] [place] [address] yesterday and called EMS for transport to [facility] after falling in floor.    Patient Currently in Pain: Yes  Pain Assessment  Pain Location: Leg;Throat; Foot  Vital Signs  Temp: 98.2 °F (36.8 °C)  Temp Source: Temporal  Pulse: 87  Heart Rate Source: Monitor  BP: (!) 158/61  BP Location: Right Arm  BP Upper/Lower: Lower  MAP (mmHg): 102  Patient Currently in Pain: Yes  Oxygen Therapy  SpO2: 97 %  O2 Device: None (Room air)  Social/Functional History  Social/Functional History  Lives With: Spouse(w/ son and daughter)  Type of Home: Trailer(double wide trailer)  Home Layout: One level  Home Access: Ramped entrance  Bathroom Shower/Tub: Tub/Shower unit(sponge bathes, cannot get in shower)  Bathroom Toilet: Handicap height  Home Equipment: Cane, Standard walker  ADL Assistance: Needs assistance(was able to but cannot now.    assists with bathing)  Homemaking Assistance: Needs assistance  Homemaking Responsibilities: No  Ambulation Assistance: Needs assistance  Transfer Assistance: Needs assistance  Active : No  Leisure & Hobbies: play games       Objective   Vision: Impaired  Vision Exceptions: Wears glasses at all times  Hearing: Within functional limits    Orientation  Overall Orientation Status: Within Functional Limits  Observation/Palpation  Observation: Pt lying in bed, morbidly obese, c/o pain in feet   Balance  Sitting Balance: Unable to assess(comment)  Standing Balance: Unable to assess(comment)  ADL  LE Dressing: Maximum assistance  Tone RUE  RUE Tone: Normotonic  Tone LUE  LUE Tone: Normotonic  Coordination  Movements Are Fluid And Coordinated: Yes     Bed mobility  Rolling to Left: Minimal assistance  Rolling to Right: Moderate assistance  Supine to Sit: Unable to assess  Sit to Supine: Unable to assess  Scooting: Unable to assess  Transfers  Stand Pivot Transfers: Unable to assess  Sit to stand: Unable to assess     Cognition  Overall Cognitive Status: WFL        Sensation  Overall Sensation Status: WFL        LUE AROM (degrees)  LUE AROM : WFL  LUE General AROM: Pain in end ranges  RUE AROM (degrees)  RUE AROM : WFL  LUE Strength  L Shoulder Flex: 4/5  L Elbow Flex: 4+/5  L Elbow Ext: 4+/5  L Hand General: 4-/5  RUE Strength  R Shoulder Flex: 4/5  R Elbow Flex: 4+/5  R Elbow Ext: 4+/5  R Hand General: 4-/5                   Plan   Plan  Times per week: 3-5  Times per day: Daily  Plan weeks: 1  Current Treatment Recommendations: Strengthening, Endurance Training, Functional Mobility Training, Positioning, Safety Education & Training, Self-Care / ADL, Patient/Caregiver Education & Training      Goals  Short term goals  Time Frame for Short term goals: 1 week  Short term goal 1: Pt to come to sit at EOB with MOD I. Short term goal 2: Pt to complete toilet transfer with min assist using RW. Short term goal 3: Pt to complete toileting with CGA. Short term goal 4: Pt to complete dressing with min assist.   Short term goal 5: pt to tolerate x15 minutes of activity to increase functional activity tolerance. Therapy Time   Individual Concurrent Group Co-treatment   Time In 1018         Time Out 1002         Minutes 24              This note serves as a DC summary in the event of pt discharge.      Palmira Restrepo, OTR/L

## 2020-02-13 NOTE — ED NOTES
Assisted pt on to bedpan with the assistance of [facility] RN att, pt will ring out on call arana when done.       Reginold Ang  02/12/20 4311

## 2020-02-13 NOTE — FLOWSHEET NOTE
Assessed this shift?  Yes   Musculoskeletal   Musculoskeletal (WDL) WDL   Genitourinary   Genitourinary (WDL) X  (pt with Neumann cath and UTI)   Flank Tenderness No   Suprapubic Tenderness No   Dysuria No

## 2020-02-13 NOTE — CARE COORDINATION
Spoke with pt at [facility]. Pt is wanting NHP. No beds at [facility]. Agreeable for referral to be sent to [facility]. Referral sent to all the above. Pt states that she has needed help for \"years\" and it is getting worse. Pt states family unable to care for her at home because he is her son and a son can't take of a mom \"like that\". Pt educated that insurance may not pay for NH, if that is the case, pt is aware that home with [facility] and family assist is the option. Pt verbalized understanding. Awaiting to hear from NHs.

## 2020-02-13 NOTE — PROGRESS NOTES
Attempted to assess the patients skin folds and bilateral feet. Patient C/O of to much discomfort with just the slightest pressure. Patient unable to turn self very well offered overhead trapeze patient refused stated \" I cant really use that very well. \" Patient refused Lovenox att pt would rather talk to the doctor first before receiving the shot. Patient noted to have very strong body odor. Patient doesn't want to be touched at this time. Will continue to monitor .

## 2020-02-13 NOTE — ED PROVIDER NOTES
Value Ref Range    Mucus, UA Rare (A) /LPF    WBC, UA 20-50 (A) 0 - 5 /HPF    RBC, UA 0-2 0 - 2 /HPF    Epi Cells 0-2 /HPF    Bacteria, UA 3+ (A) /HPF        All other labs were within normal range or not returned as of this dictation. EMERGENCY DEPARTMENT COURSE and DIFFERENTIAL DIAGNOSIS/MDM:   Vitals:    Vitals:    02/12/20 1938   BP: (!) 126/51   Pulse: 85   Resp: 20   Temp: 98.1 °F (36.7 °C)   TempSrc: Oral   SpO2: 95%   Weight: (!) 400 lb (181.4 kg)   Height: 5' 6\" (1.676 m)       MEDICATIONS ADMINISTERED IN ED:  Medications   cefTRIAXone (ROCEPHIN) 1 g IVPB in 50 mL D5W minibag (1 g Intravenous New Bag 2/12/20 2215)       Patient was placed examination room at which time after exam was performed IV was obtained and labs were drawn. Patient was given 1 g Rocephin IV secondary to patient being diagnosed with UTI earlier today but not getting her medications filled. Review of patient's labs revealed slight leukocytosis along with a nitrate positive UTI with 20-50 WBCs. Results were reviewed with patient and family was decided that patient would benefit from IV antibiotics for UTI and to help aid in patient's rehabilitation and possible nursing home placement. Patient was very appreciative the care and agrees with the plan above. Dr. Seema Macias was consulted by phone who graciously accepted to admit the patient for further evaluation definitive care        The patient will follow-up with their PCP in 1-2 days for reevaluation. If the patient or family members have anyfurther concerns or any worsening symptoms they will return to the ED for reevaluation. CONSULTS:  Dr. Seema Macias was consulted by phone who graciously accepted the patient in admission for further evaluation definitive care    PROCEDURES:  Procedures    CRITICAL CARE TIME    Total Critical Care time was 0 minutes, excluding separately reportable procedures.    There was a high probability of clinically significant/life threatening deterioration in the patient's condition which required my urgent intervention. FINAL IMPRESSION      1. General weakness    2. Acute UTI    3. Leukocytosis, unspecified type    4. Obesity, unspecified classification, unspecified obesity type, unspecified whether serious comorbidity present          DISPOSITION/PLAN   DISPOSITION admit the patient per Dr. Jacinto Pleasant:  Tere Flores  [facility]  [phone]            DISCHARGE MEDICATIONS:  New Prescriptions    No medications on file       Comment: Please note this report has been produced using speech recognition software and may contain errorsrelated to that system including errors in grammar, punctuation, and spelling, as well as words and phrases that may be inappropriate. If there are any questions or concerns please feel free to contact the dictating providerfor clarification.     Dhaval Thomas MD  Attending Emergency Physician              Dhaval Thomas MD  02/12/20 8430

## 2020-02-13 NOTE — CONSULTS
· Oral Diet intake: %  · Oral Nutrition Supplement (ONS) Orders: None  · ONS intake:    · Anthropometric Measures:  · Ht: 5' 6\" (167.6 cm)   · Current Body Wt: 398 lb (180.5 kg)  · Admission Body Wt:    · Usual Body Wt:    · % Weight Change:  ,     · Ideal Body Wt: 130 lb (59 kg), % Ideal Body 306  · Adjusted Body Wt:  , body weight adjusted for    · BMI Classification: BMI > or equal to 40.0 Obese Class III(64.4)    Nutrition Interventions:   Start ONS, Modify current diet  Continued Inpatient Monitoring, Coordination of Care, Education declined(pt states she has no needs at this time re: diet.)    Nutrition Evaluation:   · Evaluation: Goals set   · Goals: to meet est needs while promoting healthy weight loss for age/condition    · Monitoring: Meal Intake, Supplement Intake, Skin Integrity, I&O, Weight, Pertinent Labs      Electronically signed by Kath Patel on 2/13/20 at 4:16 PM

## 2020-02-13 NOTE — ED NOTES
Placed call to Dr. Marium Diaz for possible admission, phone call transferred to Dr. Naga Menchaca.      Bill Sanchez RN  02/12/20 4673

## 2020-02-13 NOTE — ED NOTES
Correction patient will be going to [facility] for bariatric bed.       Olinda Vega RN  02/12/20 [address], RN  02/12/20 9707

## 2020-02-14 ENCOUNTER — APPOINTMENT (OUTPATIENT)
Dept: GENERAL RADIOLOGY | Facility: HOSPITAL | Age: 70
DRG: 690 | End: 2020-02-14
Payer: MEDICAID

## 2020-02-14 ENCOUNTER — APPOINTMENT (OUTPATIENT)
Dept: CT IMAGING | Facility: HOSPITAL | Age: 70
DRG: 690 | End: 2020-02-14
Payer: MEDICAID

## 2020-02-14 PROBLEM — D64.9 ANEMIA: Status: ACTIVE | Noted: 2020-02-14

## 2020-02-14 PROBLEM — M54.50 LOW BACK PAIN: Status: ACTIVE | Noted: 2020-02-14

## 2020-02-14 LAB
A/G RATIO: 0.6 (ref 0.8–2)
ALBUMIN SERPL-MCNC: 2.8 G/DL (ref 3.4–4.8)
ALP BLD-CCNC: 84 U/L (ref 25–100)
ALT SERPL-CCNC: 14 U/L (ref 4–36)
ANION GAP SERPL CALCULATED.3IONS-SCNC: 7 MMOL/L (ref 3–16)
AST SERPL-CCNC: 34 U/L (ref 8–33)
BACTERIA SPEC AEROBE CULT: ABNORMAL
BILIRUB SERPL-MCNC: <0.2 MG/DL (ref 0.3–1.2)
BUN BLDV-MCNC: 23 MG/DL (ref 6–20)
CALCIUM SERPL-MCNC: 8.3 MG/DL (ref 8.5–10.5)
CHLORIDE BLD-SCNC: 104 MMOL/L (ref 98–107)
CO2: 30 MMOL/L (ref 20–30)
CREAT SERPL-MCNC: 0.8 MG/DL (ref 0.4–1.2)
GFR AFRICAN AMERICAN: >59
GFR NON-AFRICAN AMERICAN: >60
GLOBULIN: 4.5 G/DL
GLUCOSE BLD-MCNC: 107 MG/DL (ref 74–106)
HCT VFR BLD CALC: 30.6 % (ref 37–47)
HEMOGLOBIN: 9.3 G/DL (ref 11.5–16.5)
MCH RBC QN AUTO: 29 PG (ref 27–32)
MCHC RBC AUTO-ENTMCNC: 30.4 G/DL (ref 31–35)
MCV RBC AUTO: 95.3 FL (ref 80–100)
PDW BLD-RTO: 15.6 % (ref 11–16)
PLATELET # BLD: 209 K/UL (ref 150–400)
PMV BLD AUTO: 8.4 FL (ref 6–10)
POTASSIUM SERPL-SCNC: 3.6 MMOL/L (ref 3.4–5.1)
RBC # BLD: 3.21 M/UL (ref 3.8–5.8)
SODIUM BLD-SCNC: 141 MMOL/L (ref 136–145)
TOTAL CK: 284 U/L (ref 26–174)
TOTAL PROTEIN: 7.3 G/DL (ref 6.4–8.3)
WBC # BLD: 9.9 K/UL (ref 4–11)

## 2020-02-14 PROCEDURE — 96366 THER/PROPH/DIAG IV INF ADDON: CPT

## 2020-02-14 PROCEDURE — 85027 COMPLETE CBC AUTOMATED: CPT

## 2020-02-14 PROCEDURE — G0378 HOSPITAL OBSERVATION PER HR: HCPCS

## 2020-02-14 PROCEDURE — 72131 CT LUMBAR SPINE W/O DYE: CPT

## 2020-02-14 PROCEDURE — 96372 THER/PROPH/DIAG INJ SC/IM: CPT

## 2020-02-14 PROCEDURE — 80053 COMPREHEN METABOLIC PANEL: CPT

## 2020-02-14 PROCEDURE — 6360000002 HC RX W HCPCS: Performed by: PEDIATRICS

## 2020-02-14 PROCEDURE — 6370000000 HC RX 637 (ALT 250 FOR IP): Performed by: PEDIATRICS

## 2020-02-14 PROCEDURE — 1200000000 HC SEMI PRIVATE

## 2020-02-14 PROCEDURE — 2500000003 HC RX 250 WO HCPCS: Performed by: INTERNAL MEDICINE

## 2020-02-14 PROCEDURE — 99232 SBSQ HOSP IP/OBS MODERATE 35: CPT | Performed by: INTERNAL MEDICINE

## 2020-02-14 PROCEDURE — 2580000003 HC RX 258: Performed by: PEDIATRICS

## 2020-02-14 PROCEDURE — 82550 ASSAY OF CK (CPK): CPT

## 2020-02-14 PROCEDURE — 36415 COLL VENOUS BLD VENIPUNCTURE: CPT

## 2020-02-14 PROCEDURE — 72192 CT PELVIS W/O DYE: CPT

## 2020-02-14 PROCEDURE — 6370000000 HC RX 637 (ALT 250 FOR IP): Performed by: PHYSICIAN ASSISTANT

## 2020-02-14 RX ORDER — LIDOCAINE 4 G/G
2 PATCH TOPICAL DAILY
Status: DISCONTINUED | OUTPATIENT
Start: 2020-02-14 | End: 2020-02-15 | Stop reason: HOSPADM

## 2020-02-14 RX ORDER — TRAMADOL HYDROCHLORIDE 50 MG/1
50 TABLET ORAL 3 TIMES DAILY PRN
Status: DISCONTINUED | OUTPATIENT
Start: 2020-02-14 | End: 2020-02-15 | Stop reason: HOSPADM

## 2020-02-14 RX ORDER — PHENYTOIN SODIUM 100 MG/1
500 CAPSULE, EXTENDED RELEASE ORAL NIGHTLY
Status: DISCONTINUED | OUTPATIENT
Start: 2020-02-14 | End: 2020-02-15 | Stop reason: HOSPADM

## 2020-02-14 RX ADMIN — LEVOTHYROXINE SODIUM 112 MCG: 112 TABLET ORAL at 06:46

## 2020-02-14 RX ADMIN — PHENYTOIN SODIUM 500 MG: 100 CAPSULE ORAL at 21:06

## 2020-02-14 RX ADMIN — CETIRIZINE HYDROCHLORIDE 10 MG: 10 TABLET, FILM COATED ORAL at 08:45

## 2020-02-14 RX ADMIN — GABAPENTIN 200 MG: 100 CAPSULE ORAL at 08:44

## 2020-02-14 RX ADMIN — POTASSIUM BICARBONATE 20 MEQ: 391 TABLET, EFFERVESCENT ORAL at 08:45

## 2020-02-14 RX ADMIN — CEFTRIAXONE 1 G: 1 INJECTION, POWDER, FOR SOLUTION INTRAMUSCULAR; INTRAVENOUS at 21:05

## 2020-02-14 RX ADMIN — CARVEDILOL 6.25 MG: 6.25 TABLET, FILM COATED ORAL at 08:45

## 2020-02-14 RX ADMIN — MICONAZOLE NITRATE: 20 POWDER TOPICAL at 21:42

## 2020-02-14 RX ADMIN — NYSTATIN 500000 UNITS: 100000 SUSPENSION ORAL at 14:25

## 2020-02-14 RX ADMIN — LOSARTAN POTASSIUM 100 MG: 50 TABLET ORAL at 08:45

## 2020-02-14 RX ADMIN — SODIUM CHLORIDE, PRESERVATIVE FREE 10 ML: 5 INJECTION INTRAVENOUS at 08:47

## 2020-02-14 RX ADMIN — CARVEDILOL 6.25 MG: 6.25 TABLET, FILM COATED ORAL at 17:11

## 2020-02-14 RX ADMIN — ENOXAPARIN SODIUM 40 MG: 40 INJECTION SUBCUTANEOUS at 08:45

## 2020-02-14 RX ADMIN — NYSTATIN 500000 UNITS: 100000 SUSPENSION ORAL at 17:12

## 2020-02-14 RX ADMIN — DICLOFENAC 2 G: 10 GEL TOPICAL at 08:47

## 2020-02-14 RX ADMIN — GABAPENTIN 200 MG: 100 CAPSULE ORAL at 21:06

## 2020-02-14 RX ADMIN — NYSTATIN 500000 UNITS: 100000 SUSPENSION ORAL at 08:45

## 2020-02-14 RX ADMIN — FUROSEMIDE 40 MG: 40 TABLET ORAL at 08:45

## 2020-02-14 RX ADMIN — FAMOTIDINE 40 MG: 20 TABLET ORAL at 08:45

## 2020-02-14 RX ADMIN — DICLOFENAC 2 G: 10 GEL TOPICAL at 21:06

## 2020-02-14 RX ADMIN — TRAMADOL HYDROCHLORIDE 50 MG: 50 TABLET, FILM COATED ORAL at 17:11

## 2020-02-14 RX ADMIN — SODIUM CHLORIDE, PRESERVATIVE FREE 10 ML: 5 INJECTION INTRAVENOUS at 21:07

## 2020-02-14 RX ADMIN — NYSTATIN 500000 UNITS: 100000 SUSPENSION ORAL at 21:06

## 2020-02-14 RX ADMIN — ACETAMINOPHEN 650 MG: 325 TABLET, FILM COATED ORAL at 14:25

## 2020-02-14 ASSESSMENT — PAIN SCALES - GENERAL
PAINLEVEL_OUTOF10: 8
PAINLEVEL_OUTOF10: 8

## 2020-02-14 NOTE — FLOWSHEET NOTE
02/13/20 2031   Assessment   Charting Type Shift assessment   Neurological   Neuro (WDL) WDL   Level of Consciousness 0   [place] Coma Scale   Eye Opening 4   Best Verbal Response 5   Best Motor Response 6   Huy Coma Scale Score 15   HEENT   HEENT (WDL) X   Right Eye Impaired vision   Left Eye Impaired vision   Teeth Missing teeth   Tongue Coated  (pt states she has thrush)   Respiratory   Respiratory (WDL) WDL   Cardiac   Cardiac (WDL) WDL   Gastrointestinal   Abdominal (WDL) X   Abdomen Inspection Distended;Rounded   Last BM (including prior to admit) 02/12/20   Tenderness No guarding;Nontender   RUQ Bowel Sounds Active   LUQ Bowel Sounds Active   RLQ Bowel Sounds Active   LLQ Bowel Sounds Active   Peripheral Vascular   Peripheral Vascular (WDL) WDL   Edema Right lower extremity; Left lower extremity;Generalized   Edema Generalized Non-pitting   RLE Edema +2;Pitting   LLE Edema +2;Pitting   Skin Color/Condition   Skin Color/Condition (WDL) X   Skin Color Pale   Skin Condition/Temp Warm;Dry;Flaky   Skin Integrity   Skin Integrity (WDL) X   Skin Integrity Redness; Other (Comment)  (rough skin)   Location   (skin folds, BLE)   Preventative Dressing Yes   Skin Fold Management Yes   Multiple Skin Integrity Sites Yes   Assessed this shift?  Yes   Assessed this shift Red;Moist   Skin Integrity Site 3   Skin Integrity Location 3 Tear    Location 3 btx   Preventative Dressing Yes   Dressing Site Sacrum  (pharmaceutical)   Musculoskeletal   Musculoskeletal (WDL) WDL   Genitourinary   Genitourinary (WDL) X  (pt with Neumann cath and UTI)   Flank Tenderness No   Suprapubic Tenderness No   Dysuria No   Urine Assessment   Urine Color Yellow/straw   Urine Appearance Clear   Urethral Catheter Straight-tip 16 fr   Placement Date/Time: 02/12/20 2248   Urethral Catheter Timeout: Patient;Procedure;Site/Side;Appropriate Equipment;Sterile technique  Catheter Type: Straight-tip  Tube Size (fr): 16 fr  Catheter Balloon Size: 10 mL Urine Returned: Yes   Site Assessment Pink   Urine Color Yellow   Urine Appearance Clear

## 2020-02-14 NOTE — PROGRESS NOTES
Med rec completed using recent fill history from [facility]. No changes to home med list.  All medications with the exception of loratadine appear to be about two weeks late in regard to compliance.

## 2020-02-14 NOTE — CARE COORDINATION
[facility], [facility], and [facility] take pt's insurance. Sent on to [facility] therapy as pt needs therapy at home.

## 2020-02-14 NOTE — CARE COORDINATION
[facility] does not accept insurance. Referral sent on to [place] at [facility] per [place].

## 2020-02-15 ENCOUNTER — HOSPITAL ENCOUNTER (INPATIENT)
Facility: HOSPITAL | Age: 70
LOS: 27 days | Discharge: HOME HEALTH | DRG: 948 | End: 2020-03-13
Attending: INTERNAL MEDICINE | Admitting: INTERNAL MEDICINE
Payer: MEDICAID

## 2020-02-15 VITALS
DIASTOLIC BLOOD PRESSURE: 47 MMHG | SYSTOLIC BLOOD PRESSURE: 115 MMHG | WEIGHT: 293 LBS | HEART RATE: 76 BPM | RESPIRATION RATE: 18 BRPM | TEMPERATURE: 98.3 F | HEIGHT: 66 IN | BODY MASS INDEX: 47.09 KG/M2 | OXYGEN SATURATION: 91 %

## 2020-02-15 PROBLEM — M48.061 SPINAL STENOSIS OF LUMBAR REGION WITHOUT NEUROGENIC CLAUDICATION: Status: ACTIVE | Noted: 2020-02-15

## 2020-02-15 LAB
ORGANISM: ABNORMAL
URINE CULTURE, ROUTINE: ABNORMAL

## 2020-02-15 PROCEDURE — 1200000002 HC SEMI PRIVATE SWING BED

## 2020-02-15 PROCEDURE — 99238 HOSP IP/OBS DSCHRG MGMT 30/<: CPT | Performed by: PHYSICIAN ASSISTANT

## 2020-02-15 PROCEDURE — 6370000000 HC RX 637 (ALT 250 FOR IP): Performed by: PEDIATRICS

## 2020-02-15 PROCEDURE — G0378 HOSPITAL OBSERVATION PER HR: HCPCS

## 2020-02-15 PROCEDURE — 6370000000 HC RX 637 (ALT 250 FOR IP): Performed by: PHYSICIAN ASSISTANT

## 2020-02-15 PROCEDURE — 96372 THER/PROPH/DIAG INJ SC/IM: CPT

## 2020-02-15 PROCEDURE — 2580000003 HC RX 258: Performed by: PEDIATRICS

## 2020-02-15 PROCEDURE — 6360000002 HC RX W HCPCS: Performed by: PEDIATRICS

## 2020-02-15 RX ORDER — CETIRIZINE HYDROCHLORIDE 10 MG/1
5 TABLET ORAL DAILY
Status: DISCONTINUED | OUTPATIENT
Start: 2020-02-16 | End: 2020-03-13 | Stop reason: HOSPADM

## 2020-02-15 RX ORDER — POLYETHYLENE GLYCOL 3350 17 G/17G
17 POWDER, FOR SOLUTION ORAL DAILY PRN
Status: DISCONTINUED | OUTPATIENT
Start: 2020-02-15 | End: 2020-02-16

## 2020-02-15 RX ORDER — PHENYTOIN SODIUM 100 MG/1
500 CAPSULE, EXTENDED RELEASE ORAL NIGHTLY
Status: DISCONTINUED | OUTPATIENT
Start: 2020-02-16 | End: 2020-03-13 | Stop reason: HOSPADM

## 2020-02-15 RX ORDER — FAMOTIDINE 20 MG/1
40 TABLET, FILM COATED ORAL DAILY
Status: DISCONTINUED | OUTPATIENT
Start: 2020-02-16 | End: 2020-03-13 | Stop reason: HOSPADM

## 2020-02-15 RX ORDER — FUROSEMIDE 40 MG/1
40 TABLET ORAL DAILY
Status: CANCELLED | OUTPATIENT
Start: 2020-02-16

## 2020-02-15 RX ORDER — ACETAMINOPHEN 325 MG/1
650 TABLET ORAL EVERY 4 HOURS PRN
Status: CANCELLED | OUTPATIENT
Start: 2020-02-15

## 2020-02-15 RX ORDER — SODIUM CHLORIDE 0.9 % (FLUSH) 0.9 %
10 SYRINGE (ML) INJECTION EVERY 12 HOURS SCHEDULED
Status: CANCELLED | OUTPATIENT
Start: 2020-02-15

## 2020-02-15 RX ORDER — POLYETHYLENE GLYCOL 3350 17 G/17G
17 POWDER, FOR SOLUTION ORAL DAILY PRN
Status: CANCELLED | OUTPATIENT
Start: 2020-02-15

## 2020-02-15 RX ORDER — SODIUM CHLORIDE 0.9 % (FLUSH) 0.9 %
10 SYRINGE (ML) INJECTION PRN
Status: CANCELLED | OUTPATIENT
Start: 2020-02-15

## 2020-02-15 RX ORDER — SODIUM CHLORIDE 0.9 % (FLUSH) 0.9 %
10 SYRINGE (ML) INJECTION PRN
Status: DISCONTINUED | OUTPATIENT
Start: 2020-02-15 | End: 2020-03-13 | Stop reason: HOSPADM

## 2020-02-15 RX ORDER — GABAPENTIN 300 MG/1
300 CAPSULE ORAL 2 TIMES DAILY
Status: DISCONTINUED | OUTPATIENT
Start: 2020-02-15 | End: 2020-02-15 | Stop reason: HOSPADM

## 2020-02-15 RX ORDER — CEFDINIR 300 MG/1
300 CAPSULE ORAL EVERY 12 HOURS SCHEDULED
Status: CANCELLED | OUTPATIENT
Start: 2020-02-15

## 2020-02-15 RX ORDER — ACETAMINOPHEN 325 MG/1
650 TABLET ORAL EVERY 4 HOURS PRN
Status: DISCONTINUED | OUTPATIENT
Start: 2020-02-15 | End: 2020-02-15

## 2020-02-15 RX ORDER — LIDOCAINE 4 G/G
2 PATCH TOPICAL DAILY
Status: DISCONTINUED | OUTPATIENT
Start: 2020-02-16 | End: 2020-03-13 | Stop reason: HOSPADM

## 2020-02-15 RX ORDER — LIDOCAINE 4 G/G
2 PATCH TOPICAL DAILY
Qty: 60 PATCH | Refills: 0 | Status: ON HOLD | OUTPATIENT
Start: 2020-02-16 | End: 2020-03-13 | Stop reason: SDUPTHER

## 2020-02-15 RX ORDER — TRAMADOL HYDROCHLORIDE 50 MG/1
50 TABLET ORAL 3 TIMES DAILY PRN
Status: DISCONTINUED | OUTPATIENT
Start: 2020-02-15 | End: 2020-03-12

## 2020-02-15 RX ORDER — CEFDINIR 300 MG/1
300 CAPSULE ORAL EVERY 12 HOURS SCHEDULED
Status: DISCONTINUED | OUTPATIENT
Start: 2020-02-15 | End: 2020-02-15 | Stop reason: HOSPADM

## 2020-02-15 RX ORDER — FUROSEMIDE 40 MG/1
40 TABLET ORAL DAILY
Status: DISCONTINUED | OUTPATIENT
Start: 2020-02-16 | End: 2020-03-13 | Stop reason: HOSPADM

## 2020-02-15 RX ORDER — PHENYTOIN SODIUM 100 MG/1
500 CAPSULE, EXTENDED RELEASE ORAL NIGHTLY
Status: CANCELLED | OUTPATIENT
Start: 2020-02-15

## 2020-02-15 RX ORDER — GABAPENTIN 300 MG/1
300 CAPSULE ORAL 2 TIMES DAILY
Status: CANCELLED | OUTPATIENT
Start: 2020-02-15

## 2020-02-15 RX ORDER — TRAMADOL HYDROCHLORIDE 50 MG/1
50 TABLET ORAL 3 TIMES DAILY PRN
Status: CANCELLED | OUTPATIENT
Start: 2020-02-15

## 2020-02-15 RX ORDER — CARVEDILOL 6.25 MG/1
6.25 TABLET ORAL 2 TIMES DAILY WITH MEALS
Status: DISCONTINUED | OUTPATIENT
Start: 2020-02-16 | End: 2020-03-13 | Stop reason: HOSPADM

## 2020-02-15 RX ORDER — GABAPENTIN 300 MG/1
300 CAPSULE ORAL 2 TIMES DAILY
Status: DISCONTINUED | OUTPATIENT
Start: 2020-02-16 | End: 2020-03-13 | Stop reason: HOSPADM

## 2020-02-15 RX ORDER — LEVOTHYROXINE SODIUM 112 UG/1
112 TABLET ORAL DAILY
Status: DISCONTINUED | OUTPATIENT
Start: 2020-02-16 | End: 2020-03-13 | Stop reason: HOSPADM

## 2020-02-15 RX ORDER — LEVOTHYROXINE SODIUM 112 UG/1
112 TABLET ORAL DAILY
Status: CANCELLED | OUTPATIENT
Start: 2020-02-16

## 2020-02-15 RX ORDER — LOSARTAN POTASSIUM 50 MG/1
100 TABLET ORAL DAILY
Status: DISCONTINUED | OUTPATIENT
Start: 2020-02-16 | End: 2020-03-13 | Stop reason: HOSPADM

## 2020-02-15 RX ORDER — CEFDINIR 300 MG/1
300 CAPSULE ORAL EVERY 12 HOURS SCHEDULED
Status: COMPLETED | OUTPATIENT
Start: 2020-02-16 | End: 2020-02-18

## 2020-02-15 RX ORDER — SODIUM CHLORIDE 0.9 % (FLUSH) 0.9 %
10 SYRINGE (ML) INJECTION EVERY 12 HOURS SCHEDULED
Status: DISCONTINUED | OUTPATIENT
Start: 2020-02-16 | End: 2020-03-13 | Stop reason: HOSPADM

## 2020-02-15 RX ORDER — ACETAMINOPHEN 325 MG/1
650 TABLET ORAL EVERY 4 HOURS PRN
Status: DISCONTINUED | OUTPATIENT
Start: 2020-02-15 | End: 2020-03-13 | Stop reason: HOSPADM

## 2020-02-15 RX ORDER — CETIRIZINE HYDROCHLORIDE 10 MG/1
5 TABLET ORAL DAILY
Status: CANCELLED | OUTPATIENT
Start: 2020-02-16

## 2020-02-15 RX ORDER — LIDOCAINE 4 G/G
2 PATCH TOPICAL DAILY
Status: CANCELLED | OUTPATIENT
Start: 2020-02-16

## 2020-02-15 RX ORDER — ACETAMINOPHEN 325 MG/1
650 TABLET ORAL EVERY 4 HOURS PRN
Qty: 120 TABLET | Refills: 3 | Status: SHIPPED | OUTPATIENT
Start: 2020-02-15

## 2020-02-15 RX ORDER — CEFDINIR 300 MG/1
300 CAPSULE ORAL 2 TIMES DAILY
Qty: 10 CAPSULE | Refills: 0 | Status: ON HOLD | OUTPATIENT
Start: 2020-02-15 | End: 2020-03-13 | Stop reason: HOSPADM

## 2020-02-15 RX ORDER — FAMOTIDINE 20 MG/1
40 TABLET, FILM COATED ORAL DAILY
Status: CANCELLED | OUTPATIENT
Start: 2020-02-16

## 2020-02-15 RX ORDER — CARVEDILOL 6.25 MG/1
6.25 TABLET ORAL 2 TIMES DAILY WITH MEALS
Status: CANCELLED | OUTPATIENT
Start: 2020-02-16

## 2020-02-15 RX ORDER — LOSARTAN POTASSIUM 50 MG/1
100 TABLET ORAL DAILY
Status: CANCELLED | OUTPATIENT
Start: 2020-02-16

## 2020-02-15 RX ADMIN — GABAPENTIN 200 MG: 100 CAPSULE ORAL at 08:54

## 2020-02-15 RX ADMIN — SODIUM CHLORIDE, PRESERVATIVE FREE 10 ML: 5 INJECTION INTRAVENOUS at 08:54

## 2020-02-15 RX ADMIN — ENOXAPARIN SODIUM 40 MG: 40 INJECTION SUBCUTANEOUS at 08:55

## 2020-02-15 RX ADMIN — MICONAZOLE NITRATE: 20 POWDER TOPICAL at 09:05

## 2020-02-15 RX ADMIN — POTASSIUM BICARBONATE 20 MEQ: 391 TABLET, EFFERVESCENT ORAL at 08:53

## 2020-02-15 RX ADMIN — TRAMADOL HYDROCHLORIDE 50 MG: 50 TABLET, FILM COATED ORAL at 20:34

## 2020-02-15 RX ADMIN — LEVOTHYROXINE SODIUM 112 MCG: 112 TABLET ORAL at 08:53

## 2020-02-15 RX ADMIN — MICONAZOLE NITRATE: 20 POWDER TOPICAL at 20:35

## 2020-02-15 RX ADMIN — NYSTATIN 500000 UNITS: 100000 SUSPENSION ORAL at 08:53

## 2020-02-15 RX ADMIN — CEFDINIR 300 MG: 300 CAPSULE ORAL at 20:34

## 2020-02-15 RX ADMIN — PHENYTOIN SODIUM 500 MG: 100 CAPSULE ORAL at 20:34

## 2020-02-15 RX ADMIN — NYSTATIN 500000 UNITS: 100000 SUSPENSION ORAL at 20:34

## 2020-02-15 RX ADMIN — GABAPENTIN 300 MG: 300 CAPSULE ORAL at 20:34

## 2020-02-15 RX ADMIN — CETIRIZINE HYDROCHLORIDE 10 MG: 10 TABLET, FILM COATED ORAL at 08:54

## 2020-02-15 RX ADMIN — CARVEDILOL 6.25 MG: 6.25 TABLET, FILM COATED ORAL at 17:30

## 2020-02-15 RX ADMIN — SODIUM CHLORIDE, PRESERVATIVE FREE 10 ML: 5 INJECTION INTRAVENOUS at 20:35

## 2020-02-15 RX ADMIN — CARVEDILOL 6.25 MG: 6.25 TABLET, FILM COATED ORAL at 08:53

## 2020-02-15 RX ADMIN — DICLOFENAC 2 G: 10 GEL TOPICAL at 08:54

## 2020-02-15 RX ADMIN — LOSARTAN POTASSIUM 100 MG: 50 TABLET ORAL at 08:54

## 2020-02-15 RX ADMIN — NYSTATIN 500000 UNITS: 100000 SUSPENSION ORAL at 17:30

## 2020-02-15 RX ADMIN — FAMOTIDINE 40 MG: 20 TABLET ORAL at 08:53

## 2020-02-15 RX ADMIN — DICLOFENAC 2 G: 10 GEL TOPICAL at 20:34

## 2020-02-15 ASSESSMENT — PAIN SCALES - GENERAL: PAINLEVEL_OUTOF10: 8

## 2020-02-15 NOTE — PLAN OF CARE
Problem: Falls - Risk of:  Goal: Will remain free from falls  Description  Will remain free from falls  2/14/2020 2351 by Janie Casey RN  Outcome: Ongoing     Problem: Risk for Impaired Skin Integrity  Goal: Tissue integrity - skin and mucous membranes  Description  Structural intactness and normal physiological function of skin and  mucous membranes.   2/15/2020 1028 by Vijay Perez RN  Outcome: Ongoing  2/14/2020 2351 by Janie Casey RN  Outcome: Ongoing     Problem: Pain Control  Goal: Maintain pain level at or below patient's acceptable level (or 5 if patient is unable to determine acceptable level)  Outcome: Ongoing

## 2020-02-15 NOTE — PROGRESS NOTES
Attempted second number, [phone], for Ysabel Tiwari to inform him that [facility] is not accepting pt for transfer. No answer and there is not a voice mailbox set up.
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
## 2020-02-16 PROBLEM — I10 BENIGN ESSENTIAL HTN: Status: ACTIVE | Noted: 2020-02-16

## 2020-02-16 PROCEDURE — 6360000002 HC RX W HCPCS: Performed by: PHYSICIAN ASSISTANT

## 2020-02-16 PROCEDURE — 2580000003 HC RX 258: Performed by: PHYSICIAN ASSISTANT

## 2020-02-16 PROCEDURE — 1200000002 HC SEMI PRIVATE SWING BED

## 2020-02-16 PROCEDURE — 2500000003 HC RX 250 WO HCPCS: Performed by: PHYSICIAN ASSISTANT

## 2020-02-16 PROCEDURE — 6370000000 HC RX 637 (ALT 250 FOR IP): Performed by: PHYSICIAN ASSISTANT

## 2020-02-16 PROCEDURE — 99305 1ST NF CARE MODERATE MDM 35: CPT | Performed by: INTERNAL MEDICINE

## 2020-02-16 RX ORDER — LEVOTHYROXINE SODIUM 112 UG/1
TABLET ORAL
Status: DISPENSED
Start: 2020-02-16 | End: 2020-02-16

## 2020-02-16 RX ORDER — POLYETHYLENE GLYCOL 3350 17 G/17G
17 POWDER, FOR SOLUTION ORAL DAILY
Status: DISCONTINUED | OUTPATIENT
Start: 2020-02-16 | End: 2020-03-01

## 2020-02-16 RX ORDER — DOCUSATE SODIUM 100 MG/1
100 CAPSULE, LIQUID FILLED ORAL DAILY
Status: DISCONTINUED | OUTPATIENT
Start: 2020-02-16 | End: 2020-03-01

## 2020-02-16 RX ADMIN — POLYETHYLENE GLYCOL 3350 17 G: 17 POWDER, FOR SOLUTION ORAL at 12:42

## 2020-02-16 RX ADMIN — CEFDINIR 300 MG: 300 CAPSULE ORAL at 20:59

## 2020-02-16 RX ADMIN — FAMOTIDINE 40 MG: 20 TABLET, FILM COATED ORAL at 08:02

## 2020-02-16 RX ADMIN — CARVEDILOL 6.25 MG: 6.25 TABLET, FILM COATED ORAL at 17:23

## 2020-02-16 RX ADMIN — Medication 10 ML: at 21:01

## 2020-02-16 RX ADMIN — GABAPENTIN 300 MG: 300 CAPSULE ORAL at 20:58

## 2020-02-16 RX ADMIN — POTASSIUM BICARBONATE 20 MEQ: 782 TABLET, EFFERVESCENT ORAL at 08:02

## 2020-02-16 RX ADMIN — LEVOTHYROXINE SODIUM 112 MCG: 112 TABLET ORAL at 06:02

## 2020-02-16 RX ADMIN — MICONAZOLE NITRATE: 20 POWDER TOPICAL at 21:00

## 2020-02-16 RX ADMIN — FUROSEMIDE 40 MG: 40 TABLET ORAL at 08:02

## 2020-02-16 RX ADMIN — TRAMADOL HYDROCHLORIDE 50 MG: 50 TABLET, FILM COATED ORAL at 04:09

## 2020-02-16 RX ADMIN — TRAMADOL HYDROCHLORIDE 50 MG: 50 TABLET, FILM COATED ORAL at 20:57

## 2020-02-16 RX ADMIN — MICONAZOLE NITRATE: 20 POWDER TOPICAL at 08:05

## 2020-02-16 RX ADMIN — DICLOFENAC 2 G: 10 GEL TOPICAL at 21:00

## 2020-02-16 RX ADMIN — GABAPENTIN 300 MG: 300 CAPSULE ORAL at 08:02

## 2020-02-16 RX ADMIN — NYSTATIN 500000 UNITS: 500000 SUSPENSION ORAL at 20:59

## 2020-02-16 RX ADMIN — PHENYTOIN SODIUM 500 MG: 100 CAPSULE ORAL at 20:58

## 2020-02-16 RX ADMIN — LOSARTAN POTASSIUM 100 MG: 50 TABLET, FILM COATED ORAL at 08:03

## 2020-02-16 RX ADMIN — DOCUSATE SODIUM 100 MG: 100 CAPSULE, LIQUID FILLED ORAL at 12:42

## 2020-02-16 RX ADMIN — NYSTATIN 500000 UNITS: 500000 SUSPENSION ORAL at 17:24

## 2020-02-16 RX ADMIN — CEFDINIR 300 MG: 300 CAPSULE ORAL at 08:02

## 2020-02-16 RX ADMIN — NYSTATIN 500000 UNITS: 500000 SUSPENSION ORAL at 08:02

## 2020-02-16 RX ADMIN — CETIRIZINE HYDROCHLORIDE 5 MG: 10 TABLET, FILM COATED ORAL at 08:03

## 2020-02-16 RX ADMIN — DICLOFENAC 2 G: 10 GEL TOPICAL at 08:03

## 2020-02-16 RX ADMIN — NYSTATIN 500000 UNITS: 500000 SUSPENSION ORAL at 12:42

## 2020-02-16 RX ADMIN — CARVEDILOL 6.25 MG: 6.25 TABLET, FILM COATED ORAL at 08:03

## 2020-02-16 RX ADMIN — ENOXAPARIN SODIUM 40 MG: 40 INJECTION SUBCUTANEOUS at 08:02

## 2020-02-16 ASSESSMENT — PAIN SCALES - GENERAL
PAINLEVEL_OUTOF10: 9
PAINLEVEL_OUTOF10: 6
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
## 2020-02-17 PROCEDURE — 6360000002 HC RX W HCPCS: Performed by: PHYSICIAN ASSISTANT

## 2020-02-17 PROCEDURE — 97165 OT EVAL LOW COMPLEX 30 MIN: CPT

## 2020-02-17 PROCEDURE — 97530 THERAPEUTIC ACTIVITIES: CPT

## 2020-02-17 PROCEDURE — 6370000000 HC RX 637 (ALT 250 FOR IP): Performed by: PHYSICIAN ASSISTANT

## 2020-02-17 PROCEDURE — 97162 PT EVAL MOD COMPLEX 30 MIN: CPT

## 2020-02-17 PROCEDURE — 97802 MEDICAL NUTRITION INDIV IN: CPT

## 2020-02-17 PROCEDURE — 2580000003 HC RX 258: Performed by: PHYSICIAN ASSISTANT

## 2020-02-17 PROCEDURE — 1200000002 HC SEMI PRIVATE SWING BED

## 2020-02-17 RX ADMIN — CEFDINIR 300 MG: 300 CAPSULE ORAL at 19:51

## 2020-02-17 RX ADMIN — FUROSEMIDE 40 MG: 40 TABLET ORAL at 08:25

## 2020-02-17 RX ADMIN — NYSTATIN 500000 UNITS: 500000 SUSPENSION ORAL at 08:34

## 2020-02-17 RX ADMIN — NYSTATIN 500000 UNITS: 500000 SUSPENSION ORAL at 19:51

## 2020-02-17 RX ADMIN — TRAMADOL HYDROCHLORIDE 50 MG: 50 TABLET, FILM COATED ORAL at 17:30

## 2020-02-17 RX ADMIN — POTASSIUM BICARBONATE 20 MEQ: 782 TABLET, EFFERVESCENT ORAL at 08:26

## 2020-02-17 RX ADMIN — LEVOTHYROXINE SODIUM 112 MCG: 112 TABLET ORAL at 05:32

## 2020-02-17 RX ADMIN — ENOXAPARIN SODIUM 40 MG: 40 INJECTION SUBCUTANEOUS at 08:26

## 2020-02-17 RX ADMIN — Medication 10 ML: at 19:52

## 2020-02-17 RX ADMIN — LOSARTAN POTASSIUM 100 MG: 50 TABLET, FILM COATED ORAL at 08:25

## 2020-02-17 RX ADMIN — CARVEDILOL 6.25 MG: 6.25 TABLET, FILM COATED ORAL at 08:25

## 2020-02-17 RX ADMIN — CETIRIZINE HYDROCHLORIDE 5 MG: 10 TABLET, FILM COATED ORAL at 08:25

## 2020-02-17 RX ADMIN — NYSTATIN 500000 UNITS: 500000 SUSPENSION ORAL at 11:24

## 2020-02-17 RX ADMIN — PHENYTOIN SODIUM 500 MG: 100 CAPSULE ORAL at 19:51

## 2020-02-17 RX ADMIN — GABAPENTIN 300 MG: 300 CAPSULE ORAL at 08:26

## 2020-02-17 RX ADMIN — GABAPENTIN 300 MG: 300 CAPSULE ORAL at 19:51

## 2020-02-17 RX ADMIN — NYSTATIN 500000 UNITS: 500000 SUSPENSION ORAL at 17:30

## 2020-02-17 RX ADMIN — DICLOFENAC 2 G: 10 GEL TOPICAL at 19:51

## 2020-02-17 RX ADMIN — CARVEDILOL 6.25 MG: 6.25 TABLET, FILM COATED ORAL at 17:30

## 2020-02-17 RX ADMIN — MICONAZOLE NITRATE: 20 POWDER TOPICAL at 19:52

## 2020-02-17 RX ADMIN — MICONAZOLE NITRATE: 20 POWDER TOPICAL at 08:34

## 2020-02-17 RX ADMIN — CEFDINIR 300 MG: 300 CAPSULE ORAL at 08:26

## 2020-02-17 RX ADMIN — FAMOTIDINE 40 MG: 20 TABLET, FILM COATED ORAL at 08:25

## 2020-02-17 RX ADMIN — DICLOFENAC 2 G: 10 GEL TOPICAL at 08:34

## 2020-02-17 ASSESSMENT — PAIN SCALES - GENERAL
PAINLEVEL_OUTOF10: 0
PAINLEVEL_OUTOF10: 10

## 2020-02-17 NOTE — PLAN OF CARE
Problem: Pain Control  Goal: Maintain pain level at or below patient's acceptable level (or 5 if patient is unable to determine acceptable level)  Outcome: Ongoing     Problem: Respiratory  Goal: No pulmonary complications  Outcome: Ongoing     Problem: Respiratory  Goal: O2 Sat > 90%  Outcome: Ongoing     Problem: Skin Integrity/Risk  Goal: No skin breakdown during hospitalization  2/16/2020 1534 by Heri Pires RN  Outcome: Ongoing

## 2020-02-17 NOTE — PROGRESS NOTES
Occupational Therapy   Occupational Therapy Initial Assessment  Date: 2020   Patient Name: Bassem Nance  MRN: [id number]     : 1950    Date of Service: 2020    Discharge Recommendations:  Continue to assess pending progress       Assessment   Performance deficits / Impairments: Decreased functional mobility ; Decreased endurance;Decreased balance;Decreased ADL status  Assessment: Pt agreeable to OT evaluation on this date. Pt dep for toileting. pt required DEP for wing hygiene on this date. Pt required MOD A for rolling in bed side to side. Pt educated to use bed pan as she is continent to avoid skin breakdown. Pt come to sit at EOB with MOD A. Pt tolerated sitting upright ~5 minutes with c/o dizziness. Pt assisted to supine position and assisted back to sitting after brief rest break. pt tolerated sitting upright ~10 minutes at EOB. Pt completed x10 reps of AROM in BUE shoulder flexion, ABD, and scapular retraction. Pt assisted to supine position and assisted on bed pan. Pt limited secondary to pain. Pt will benefit from skilled OT services while IP. Prognosis: Good  Decision Making: Low Complexity  OT Education: Plan of Care  Activity Tolerance  Activity Tolerance: Patient limited by pain           Patient Diagnosis(es): There were no encounter diagnoses. has a past medical history of Hypertension, Orthopnea, Seizures ([address] and Thyroid disease. has a past surgical history that includes Cholecystectomy. Restrictions  Restrictions/Precautions  Restrictions/Precautions: Fall Risk, General Precautions  Required Braces or Orthoses?: No    Subjective   General  Chart Reviewed: Yes  Patient assessed for rehabilitation services?: Yes  Family / Caregiver Present: No  Referring Practitioner: GARO Boyd  Diagnosis: Generalized weakness, UTI,   Subjective  Subjective: Pt reports pain in BLE feet and back. Agreeable to attempt therapy services on this date.    Patient Currently in Pain: Yes  Vital Signs  Patient Currently in Pain: Yes  Social/Functional History  Social/Functional History  Lives With: Spouse(w/ son and daughter)  Type of Home: Trailer(double wide)  Home Layout: One level  Home Access: Ramped entrance  Bathroom Shower/Tub: Tub/Shower unit(sponge bathes, cannot get in shower)  Bathroom Toilet: Handicap height  Home Equipment: Cane, Standard walker  ADL Assistance: Needs assistance(was able to but cannot now.  assists with bathing)  Homemaking Assistance: Needs assistance  Homemaking Responsibilities: No  Ambulation Assistance: Needs assistance  Transfer Assistance: Needs assistance  Active : No  Leisure & Hobbies: play games       Objective   Vision: Impaired  Vision Exceptions: Wears glasses at all times  Hearing: Within functional limits    Orientation  Overall Orientation Status: Within Functional Limits  Observation/Palpation  Observation: Pt lying in bed, morbidly obese, c/o pain in feet   Balance  Sitting Balance: (min<>CGA)  Standing Balance: Unable to assess(comment)  ADL  LE Dressing: Dependent/Total  Toileting: Dependent/Total  Tone RUE  RUE Tone: Normotonic  Tone LUE  LUE Tone: Normotonic  Coordination  Movements Are Fluid And Coordinated: Yes     Bed mobility  Rolling to Left: Moderate assistance  Rolling to Right: Moderate assistance  Supine to Sit: Moderate assistance(x2)  Sit to Supine:  Moderate assistance(x2)  Scooting: Maximal assistance  Transfers  Stand Pivot Transfers: Unable to assess  Sit to stand: Unable to assess     Cognition  Overall Cognitive Status: WFL        Sensation  Overall Sensation Status: WFL        LUE AROM (degrees)  LUE AROM : WFL  RUE AROM (degrees)  RUE AROM : WFL  LUE Strength  L Shoulder Flex: 4/5  L Elbow Flex: 4+/5  L Elbow Ext: 4+/5  L Hand General: 4/5  RUE Strength  R Shoulder Flex: 4/5  R Elbow Flex: 4+/5  R Elbow Ext: 4+/5  R Hand General: 4/5                   Plan   Plan  Times per week: 3-5  Times per day: Daily  Plan weeks: 1  Current Treatment Recommendations: Strengthening, Endurance Training, Functional Mobility Training, Positioning, Safety Education & Training, Self-Care / ADL, Patient/Caregiver Education & Training      Goals  Short term goals  Time Frame for Short term goals: 1 week  Short term goal 1: Pt to come to sit at EOB with MOD I. Short term goal 2: Pt to complete toilet transfer with min assist using RW. Short term goal 3: Pt to complete toileting with MOD A. Short term goal 4: Pt to complete dressing with min assist.   Short term goal 5: pt to tolerate x15 minutes of activity to increase functional activity tolerance. Long term goals  Time Frame for Long term goals : 2 week  Long term goal 1: Pt to complete toileting with SBA. Long term goal 2: Pt to complete dressing with CGA. Long term goal 3: Pt to complete toileting with MIN A. Therapy Time   Individual Concurrent Group Co-treatment   Time In 0940         Time Out 6600         Minutes 53              This note serves as a DC summary in the event of pt discharge.      Palmira Restrepo OTR/L

## 2020-02-17 NOTE — FLOWSHEET NOTE
Location Scattered BUE,BLE   Preventative Dressing No   Assessed this shift? Yes   Skin Fold Management Yes   Dressing Site Abdominal pannus   Treatment Pharmaceutical   Date applied? 02/16/20   Assessed this shift Red;Moist   Multiple Skin Integrity Sites Yes   Skin Integrity Site 2   Skin Integrity Location 2 Tear   Location 2 Buttocks   Preventative Dressing No   Assessed this shift? Yes   Musculoskeletal   Musculoskeletal (WDL) X   RUE Limited movement   LUE Limited movement   RL Extremity Limited movement;Weakness; Swelling   LL Extremity Limited movement;Weakness; Swelling   Genitourinary   Genitourinary (WDL) X   Flank Tenderness No   Suprapubic Tenderness No   Dysuria No   Urine Assessment   Urine Color Yellow/straw   Urine Appearance Clear   Psychosocial   Psychosocial (WDL) WDL   Pt awake in bed. Pt alert and oriented. Pt appears in no acute distress. Pt currently on RA. Pt lung sounds diminished with expiratory wheezes noted bilaterally. Pt encouraged to cough and deep breathe. Pt call bell and bedside table within reach. Will continue to monitor pt.

## 2020-02-17 NOTE — PROGRESS NOTES
Physical Therapy    Facility/Department: [facility] MED SURG  Swing Bed Initial Assessment    NAME: Ramon Croft  : 1950  MRN: [id number]    Date of Service: 2020    Discharge Recommendations:  Continue to assess pending progress        Assessment   Body structures, Functions, Activity limitations: Decreased functional mobility ; Decreased high-level IADLs;Decreased ROM; Decreased strength; Increased pain  Assessment: Pt was able to participate with PT / OT eval today with improved motivation. She was able to sit EOB x 2 attempts, one on each side of the bed. She tolerated rolling and sitting up to the right side better. Pt performed light therex at bedside, only tolerating 5-10 reps. Pt will benefit from skilled PT to address functional mobility to improve quality of life. Treatment Diagnosis: functional decline  Prognosis: Fair  Decision Making: Medium Complexity  REQUIRES PT FOLLOW UP: Yes  Activity Tolerance  Activity Tolerance: Patient limited by pain  Activity Tolerance: limited due to morbid obesity       Patient Diagnosis(es): There were no encounter diagnoses. has a past medical history of Hypertension, Orthopnea, Seizures ([address] and Thyroid disease. has a past surgical history that includes Cholecystectomy. Restrictions  Restrictions/Precautions  Restrictions/Precautions: Fall Risk, General Precautions  Required Braces or Orthoses?: No     Vision/Hearing  Vision: Impaired  Vision Exceptions: Wears glasses at all times  Hearing: Within functional limits       Subjective  General  Chart Reviewed: Yes  Patient assessed for rehabilitation services?: Yes  Referring Practitioner: Kevin Calderon PA-C  Referral Date : 02/15/20  Subjective  Subjective: Pt presents supine in bed, she is agreeable to work with PT / OT to improve her mobility. She states her feet continue to hurt and are very sensative to touch.          Orientation  Orientation  Overall Orientation Status: Within Normal Limits days  Short term goal 1: Pt to be able to sit EOB x SBA  Short term goal 2: Pt tolerate supine or sitting therex x10-20 reps to improve LE ROM and strength  Short term goal 3: Standing and transfer ability to be assessed. Short term goal 4: Pt to move supine to sit x Min A       Therapy Time   Individual Concurrent Group Co-treatment   Time In 0954         Time Out 1030         Minutes [address], PT     This note serves as D/C summary if patient is discharged prior to next visit.

## 2020-02-18 PROCEDURE — 1200000002 HC SEMI PRIVATE SWING BED

## 2020-02-18 PROCEDURE — 6370000000 HC RX 637 (ALT 250 FOR IP): Performed by: PHYSICIAN ASSISTANT

## 2020-02-18 PROCEDURE — 97110 THERAPEUTIC EXERCISES: CPT

## 2020-02-18 PROCEDURE — 97530 THERAPEUTIC ACTIVITIES: CPT

## 2020-02-18 PROCEDURE — 2580000003 HC RX 258: Performed by: PHYSICIAN ASSISTANT

## 2020-02-18 PROCEDURE — 2500000003 HC RX 250 WO HCPCS: Performed by: PHYSICIAN ASSISTANT

## 2020-02-18 PROCEDURE — 6360000002 HC RX W HCPCS: Performed by: PHYSICIAN ASSISTANT

## 2020-02-18 RX ADMIN — FUROSEMIDE 40 MG: 40 TABLET ORAL at 09:05

## 2020-02-18 RX ADMIN — CEFDINIR 300 MG: 300 CAPSULE ORAL at 21:21

## 2020-02-18 RX ADMIN — NYSTATIN 500000 UNITS: 500000 SUSPENSION ORAL at 09:06

## 2020-02-18 RX ADMIN — CETIRIZINE HYDROCHLORIDE 5 MG: 10 TABLET, FILM COATED ORAL at 09:25

## 2020-02-18 RX ADMIN — ENOXAPARIN SODIUM 40 MG: 40 INJECTION SUBCUTANEOUS at 09:06

## 2020-02-18 RX ADMIN — DICLOFENAC 2 G: 10 GEL TOPICAL at 21:21

## 2020-02-18 RX ADMIN — PHENYTOIN SODIUM 500 MG: 100 CAPSULE ORAL at 21:20

## 2020-02-18 RX ADMIN — Medication 10 ML: at 21:22

## 2020-02-18 RX ADMIN — POLYETHYLENE GLYCOL 3350 17 G: 17 POWDER, FOR SOLUTION ORAL at 09:06

## 2020-02-18 RX ADMIN — DICLOFENAC 2 G: 10 GEL TOPICAL at 09:20

## 2020-02-18 RX ADMIN — LEVOTHYROXINE SODIUM 112 MCG: 112 TABLET ORAL at 05:13

## 2020-02-18 RX ADMIN — Medication 10 ML: at 09:08

## 2020-02-18 RX ADMIN — CEFDINIR 300 MG: 300 CAPSULE ORAL at 09:06

## 2020-02-18 RX ADMIN — NYSTATIN 500000 UNITS: 500000 SUSPENSION ORAL at 16:55

## 2020-02-18 RX ADMIN — TRAMADOL HYDROCHLORIDE 50 MG: 50 TABLET, FILM COATED ORAL at 02:01

## 2020-02-18 RX ADMIN — TRAMADOL HYDROCHLORIDE 50 MG: 50 TABLET, FILM COATED ORAL at 21:20

## 2020-02-18 RX ADMIN — POTASSIUM BICARBONATE 20 MEQ: 782 TABLET, EFFERVESCENT ORAL at 09:08

## 2020-02-18 RX ADMIN — CARVEDILOL 6.25 MG: 6.25 TABLET, FILM COATED ORAL at 16:55

## 2020-02-18 RX ADMIN — MICONAZOLE NITRATE: 20 POWDER TOPICAL at 09:07

## 2020-02-18 RX ADMIN — FAMOTIDINE 40 MG: 20 TABLET, FILM COATED ORAL at 09:05

## 2020-02-18 RX ADMIN — MICONAZOLE NITRATE: 20 POWDER TOPICAL at 21:21

## 2020-02-18 RX ADMIN — NYSTATIN 500000 UNITS: 500000 SUSPENSION ORAL at 21:20

## 2020-02-18 RX ADMIN — GABAPENTIN 300 MG: 300 CAPSULE ORAL at 21:20

## 2020-02-18 RX ADMIN — GABAPENTIN 300 MG: 300 CAPSULE ORAL at 09:05

## 2020-02-18 RX ADMIN — CARVEDILOL 6.25 MG: 6.25 TABLET, FILM COATED ORAL at 09:06

## 2020-02-18 RX ADMIN — TRAMADOL HYDROCHLORIDE 50 MG: 50 TABLET, FILM COATED ORAL at 10:08

## 2020-02-18 RX ADMIN — NYSTATIN 500000 UNITS: 500000 SUSPENSION ORAL at 14:06

## 2020-02-18 RX ADMIN — DOCUSATE SODIUM 100 MG: 100 CAPSULE, LIQUID FILLED ORAL at 09:05

## 2020-02-18 RX ADMIN — LOSARTAN POTASSIUM 100 MG: 50 TABLET, FILM COATED ORAL at 09:05

## 2020-02-18 ASSESSMENT — PAIN SCALES - GENERAL
PAINLEVEL_OUTOF10: 10
PAINLEVEL_OUTOF10: 9
PAINLEVEL_OUTOF10: 10

## 2020-02-18 NOTE — PROGRESS NOTES
Physical Therapy  Facility/Department: [facility] MED SURG  Daily Treatment Note  NAME: Harriett Vega  : 1950  MRN: [id number]    Date of Service: 2020    Discharge Recommendations:  Continue to assess pending progress        Assessment   Body structures, Functions, Activity limitations: Decreased functional mobility ; Decreased high-level IADLs;Decreased ROM; Decreased strength; Increased pain  Assessment: Co treat with OT was performed today. Pt was able to sit EOB x approx 30 minutes x SBA / supervision and perform alternating UE / LE exercises with rest breaks between. Pt required Max A x 2 for supine to sit, sit to supine, and for scooting in bed. Pt demonstrated improved tolerance to sitting at EOB today. Treatment Diagnosis: functional decline  Prognosis: Fair  Decision Making: Medium Complexity  REQUIRES PT FOLLOW UP: Yes  Activity Tolerance  Activity Tolerance: Patient Tolerated treatment well     Patient Diagnosis(es): There were no encounter diagnoses. has a past medical history of Hypertension, Orthopnea, Seizures ([address] and Thyroid disease. has a past surgical history that includes Cholecystectomy. Restrictions  Restrictions/Precautions  Restrictions/Precautions: Fall Risk, General Precautions  Required Braces or Orthoses?: No     Subjective   General  Chart Reviewed: Yes  Family / Caregiver Present: No  Referring Practitioner: Mona Rodas PA-C  Subjective  Subjective: Pt presents supine in bed, she is agreeable to work with PT / OT. Pt denies any increase in soreness from yesterday's session. She states her feet continue to hurt.            Orientation  Orientation  Overall Orientation Status: Within Normal Limits    Objective   Bed mobility  Rolling to Left: Moderate assistance  Rolling to Right: Moderate assistance  Supine to Sit: Maximum assistance(x2)  Sit to Supine: Maximum assistance(x2)  Scooting: Maximal assistance(x2)  Transfers  Sit to Stand: Unable to

## 2020-02-18 NOTE — PLAN OF CARE
Problem: Pain Control  Goal: Maintain pain level at or below patient's acceptable level (or 5 if patient is unable to determine acceptable level)  2/18/2020 0935 by Pat Aleman RN  Outcome: Ongoing  2/18/2020 0933 by Pat Aleman RN  Outcome: Ongoing     Problem: Respiratory  Goal: No pulmonary complications  9/66/1183 2144 by Renetta Muhammad LPN  Outcome: Ongoing  Goal: O2 Sat > 90%  2/18/2020 0935 by Pat Aleman RN  Outcome: Ongoing  2/18/2020 0933 by Pat Aleman RN  Outcome: Ongoing  2/17/2020 2144 by Renetta Muhammad LPN  Outcome: Ongoing

## 2020-02-18 NOTE — PROGRESS NOTES
Occupational Therapy  Facility/Department: [facility] MED SURG  Daily Treatment Note  NAME: Jovani Reeves  : 1950  MRN: [id number]    Date of Service: 2020    Discharge Recommendations:  Continue to assess pending progress       Assessment   Assessment: Co tx with PT on this date. pt required max x2 to come to sit at EOB. Pt sat at EOB >30 minutes with CGA<>SBA on this date. Pt tolerated alternating BUE/BLE ther ex seated at EOB AROM. Pt required rest breaks. Attempted hygiene/grooming seated at EOB pt declined to attempt task. Pt reports, \" I can't see my hair, I don't care about what it looks like. Its hard for me to hold the brush. \" Educated pt on ways to adapt utensils in order to complete task successfully with built up handles. Pt assisted to supine with max x2 assist. Pt left in bed with call light in reach. Pt requried max x2 to scoot in bed in supine. Patient Diagnosis(es): There were no encounter diagnoses. has a past medical history of Hypertension, Orthopnea, Seizures ([address] and Thyroid disease. has a past surgical history that includes Cholecystectomy. Restrictions  Restrictions/Precautions  Restrictions/Precautions: Fall Risk, General Precautions  Required Braces or Orthoses?: No  Subjective   General  Chart Reviewed: Yes  Patient assessed for rehabilitation services?: Yes  Family / Caregiver Present: No  Referring Practitioner: GARO Preciado  Diagnosis: Generalized weakness, UTI,   Subjective  Subjective: Pt reports pain in BLE feet and back. Agreeable to attempt therapy services on this date.        Orientation     Objective          Plan   Plan  Times per week: 3-5  Times per day: Daily  Plan weeks: 1  Current Treatment Recommendations: Strengthening, Endurance Training, Functional Mobility Training, Positioning, Safety Education & Training, Self-Care / ADL, Patient/Caregiver Education & Training    Goals  Short term goals  Time Frame for Short term goals: 1 week  Short term goal 1: Pt to come to sit at EOB with MOD I. Short term goal 2: Pt to complete toilet transfer with min assist using RW. Short term goal 3: Pt to complete toileting with MOD A. Short term goal 4: Pt to complete dressing with min assist.   Short term goal 5: pt to tolerate x15 minutes of activity to increase functional activity tolerance. Long term goals  Time Frame for Long term goals : 2 week  Long term goal 1: Pt to complete toileting with SBA. Long term goal 2: Pt to complete dressing with CGA. Long term goal 3: Pt to complete toileting with MIN A. Therapy Time   Individual Concurrent Group Co-treatment   Time In 0309         Time Out 3506         Minutes 48              This note serves as a DC summary in the event of pt discharge.      Palmira Restrepo OTR/L

## 2020-02-18 NOTE — FLOWSHEET NOTE
02/17/20 2000   Assessment   Charting Type Shift assessment   Neurological   Neuro (WDL) WDL   Level of Consciousness 0   [place] Coma Scale   Eye Opening 4   Best Verbal Response 5   Best Motor Response 6   [place] Coma Scale Score 15   NIH/MNHISS Stroke Scale   NIH/MNIHSS Stroke Scale Assessed No   HEENT   HEENT (WDL) X   Right Eye Impaired vision   Left Eye Impaired vision   Teeth Missing teeth   Respiratory   Respiratory (WDL) WDL   Respiratory Pattern Regular   Respiratory Depth Normal   Respiratory Quality/Effort Unlabored   Chest Assessment Chest expansion symmetrical;Trachea midline   L Breath Sounds Diminished;End Expiratory Wheezes   R Breath Sounds Diminished;End Expiratory Wheezes   Breath Sounds   Right Upper Lobe Diminished;End Expiratory Wheezes   Right Middle Lobe Diminished   Right Lower Lobe Diminished   Left Upper Lobe Diminished;End Expiratory Wheezes   Left Lower Lobe Diminished   Cardiac   Cardiac (WDL) WDL   Cardiac Monitor   Telemetry Monitor On No   Gastrointestinal   Abdominal (WDL) X   RUQ Bowel Sounds Active   LUQ Bowel Sounds Active   RLQ Bowel Sounds Active   LLQ Bowel Sounds Active   Abdomen Inspection Rotund   Tenderness Soft;Nontender   Peripheral Vascular   Peripheral Vascular (WDL) X   Edema Right lower extremity; Left lower extremity;Generalized   Edema Generalized Non-pitting   RLE Edema +2;Pitting   LLE Edema +2;Pitting   RUE Neurovascular Assessment   Capillary Refill Less than/equal to 3 seconds   Color Pink   Temperature Warm   LUE Neurovascular Assessment   Capillary Refill Less than/equal to 3 seconds   Color Pink   Temperature Warm   RLE Neurovascular Assessment   Capillary Refill Less than/equal to 3 seconds   Color Pink   Temperature Warm   LLE Neurovascular Assessment   Capillary Refill Less than/equal to 3 seconds   Color Pink   Temperature Warm   Skin Color/Condition   Skin Color/Condition (WDL) X   Skin Integrity   Skin Integrity (WDL) X   Skin Integrity Redness; Weeping; Other (Comment)  (dry, flaky)   Location BUE,BLE   Preventative Dressing No   Assessed this shift? Yes   Skin Fold Management Yes   Dressing Site Abdominal pannus   Treatment Pharmaceutical   Date applied? 02/17/20   Assessed this shift Red;Moist   Skin Integrity Site 2   Skin Integrity Location 2 Tear   Location 2 Buttocks   Assessed this shift? Yes   Skin Integrity Site 3   Skin Integrity Location 3 Tear    Location 3 Buttocks   Preventative Dressing Yes   Dressing Site Sacrum   Date Applied 02/17/20   Musculoskeletal   Musculoskeletal (WDL) X   RUE Limited movement   LUE Limited movement   RL Extremity Limited movement;Weakness; Swelling   LL Extremity Limited movement;Weakness; Swelling   Genitourinary   Genitourinary (WDL) X   Flank Tenderness No   Suprapubic Tenderness No   Dysuria No   Urine Assessment   Incontinence Yes  (at times)   Urine Color Yellow/straw   Urine Appearance Clear   Psychosocial   Psychosocial (WDL) WDL   Pt awake in bed. Pt alert and oriented. Pt appears in no acute distress. Pt currently on RA. Pt lung sounds diminished with end expiratory wheezes noted bilaterally. Pt encouraged to cough and deep breathe. Pt call bell and bedside table within reach. Will continue to monitor pt.

## 2020-02-18 NOTE — PLAN OF CARE
Problem: Pain Control  Goal: Maintain pain level at or below patient's acceptable level (or 5 if patient is unable to determine acceptable level)  Outcome: Ongoing     Problem: Respiratory  Goal: No pulmonary complications  5/49/3671 2144 by Sanjana Myers LPN  Outcome: Ongoing  Goal: O2 Sat > 90%  2/18/2020 0933 by Leonard Rosales RN  Outcome: Ongoing  2/17/2020 2144 by Sanjana Myers LPN  Outcome: Ongoing     Problem:   Goal: No urinary complication  9/93/8646 9902 by Sanjana Myers LPN  Outcome: Ongoing     Problem: Risk for Impaired Skin Integrity  Goal: Tissue integrity - skin and mucous membranes  Description  Structural intactness and normal physiological function of skin and  mucous membranes.   Outcome: Ongoing

## 2020-02-18 NOTE — CONSULTS
Nutrition Assessment    Type and Reason for Visit: Reassess(new swing: Late entry, reassessed on 2/17)    Nutrition Recommendations:Continue with ONS BID and encourage intakes and compliance of meals. Nutrition Assessment: Pt continues to be at ongoing nutritional compromise AEB morbid obesity, increased protein needs. Pt has ONS ordered. Malnutrition Assessment:  · Malnutrition Status: No malnutrition  · Context: Acute illness or injury  · Findings of the 6 clinical characteristics of malnutrition (Minimum of 2 out of 6 clinical characteristics is required to make the diagnosis of moderate or severe Protein Calorie Malnutrition based on AND/ASPEN Guidelines):  1. Energy Intake-Unable to assess,      2. Weight Loss-No significant weight loss,    3. Fat Loss-No significant subcutaneous fat loss,    4. Muscle Loss-No significant muscle mass loss,    5. Fluid Accumulation-Moderate to severe fluid accumulation, Extremities  6.  Strength-Not measured    Nutrition Risk Level: Moderate    Nutrient Needs:  · Estimated Daily Total Kcal: 9385-1934  · Estimated Daily Protein (g): 118(2.0 gm/kg IBW)  · Estimated Daily Total Fluid (ml/day): 1126-6744(7 ml/kcal)    Nutrition Diagnosis:   · Problem: Predicted suboptimal energy intake  · Etiology: related to Pain, Renal dysfunction     Signs and symptoms:  as evidenced by Localized or generalized fluid accumulation, Lab values, BMI(poor skin integrity)    Objective Information:  · Nutrition-Focused Physical Findings: Pt is morbidly obese, has skin tears. Feet and legs swollen with edema. She has lasix ordered.     · Wound Type: Skin Tears  · Current Nutrition Therapies:  · Oral Diet Orders: 2gm Sodium   · Oral Diet intake: (54-79% of meals)  · Oral Nutrition Supplement (ONS) Orders: Wound Healing Oral Supplement  · ONS intake:    · Anthropometric Measures:  · Ht: 5' 6\" (167.6 cm)   · Current Body Wt: 396 lb 12.8 oz (180 kg)  · Admission Body Wt:    · Usual Body Wt: · % Weight Change:  ,     · Ideal Body Wt: 130 lb (59 kg), % [place] Body 305  · Adjusted Body Wt:  , body weight adjusted for    · BMI Classification: BMI > or equal to 40.0 Obese Class III(63.9)    Nutrition Interventions:   Continue current diet, Continue current ONS  Education declined, Coordination of Care    Nutrition Evaluation:   · Evaluation: Goals set   · Goals: to meet est needs while promoting healthy weight loss for age/condition    · Monitoring: Meal Intake, Supplement Intake, Skin Integrity, I&O, Weight, Pertinent Labs      Electronically signed by Beth Rubin on 2/18/20 at 10:23 AM

## 2020-02-18 NOTE — FLOWSHEET NOTE
02/18/20 0941   Assessment   Charting Type Shift assessment   Neurological   Neuro (WDL) WDL   Level of Consciousness 0   [place] Coma Scale   Eye Opening 4   Best Verbal Response 5   Best Motor Response 6   Huy Coma Scale Score 15   NIH/MNHISS Stroke Scale   NIH/MNIHSS Stroke Scale Assessed No   HEENT   HEENT (WDL) X   Right Eye Impaired vision   Left Eye Impaired vision   Teeth Missing teeth   Respiratory   Respiratory (WDL) WDL   Respiratory Pattern Regular   Respiratory Depth Normal   Respiratory Quality/Effort Unlabored   Chest Assessment Chest expansion symmetrical;Trachea midline   L Breath Sounds Diminished;End Expiratory Wheezes   R Breath Sounds Diminished;End Expiratory Wheezes   Breath Sounds   Right Upper Lobe Diminished;End Expiratory Wheezes   Right Middle Lobe Diminished   Right Lower Lobe Diminished   Left Upper Lobe Diminished;End Expiratory Wheezes   Left Lower Lobe Diminished   Cardiac   Cardiac (WDL) WDL   Cardiac Monitor   Telemetry Monitor On No   Gastrointestinal   Abdominal (WDL) X   RUQ Bowel Sounds Active   LUQ Bowel Sounds Active   RLQ Bowel Sounds Active   LLQ Bowel Sounds Active   Abdomen Inspection Rotund   Tenderness Soft;Nontender   Peripheral Vascular   Peripheral Vascular (WDL) X   Edema Right lower extremity; Left lower extremity;Generalized   Edema Generalized Non-pitting   RLE Edema +2;Pitting   LLE Edema +2;Pitting   RUE Neurovascular Assessment   Capillary Refill Less than/equal to 3 seconds   Color Pink   Temperature Warm   LUE Neurovascular Assessment   Capillary Refill Less than/equal to 3 seconds   Color Pink   Temperature Warm   RLE Neurovascular Assessment   Capillary Refill Less than/equal to 3 seconds   Color Pink   Temperature Warm   LLE Neurovascular Assessment   Capillary Refill Less than/equal to 3 seconds   Color Pink   Temperature Warm   Skin Color/Condition   Skin Color/Condition (WDL) X   Skin Color Pale   Skin Condition/Temp Dry;Flaky; Swollen; Warm   Skin

## 2020-02-19 LAB
BACTERIA: ABNORMAL /HPF
BILIRUBIN URINE: NEGATIVE
BLOOD, URINE: ABNORMAL
CLARITY: CLEAR
COLOR: YELLOW
EPITHELIAL CELLS, UA: ABNORMAL /HPF (ref 0–5)
GLUCOSE URINE: NEGATIVE MG/DL
KETONES, URINE: NEGATIVE MG/DL
LEUKOCYTE ESTERASE, URINE: NEGATIVE
MICROSCOPIC EXAMINATION: YES
NITRITE, URINE: NEGATIVE
PH UA: 7 (ref 5–8)
PROTEIN UA: NEGATIVE MG/DL
RBC UA: ABNORMAL /HPF (ref 0–4)
SPECIFIC GRAVITY UA: 1.01 (ref 1–1.03)
URINE TYPE: ABNORMAL
UROBILINOGEN, URINE: 0.2 E.U./DL
WBC UA: ABNORMAL /HPF (ref 0–5)

## 2020-02-19 PROCEDURE — 2580000003 HC RX 258: Performed by: PHYSICIAN ASSISTANT

## 2020-02-19 PROCEDURE — 1200000002 HC SEMI PRIVATE SWING BED

## 2020-02-19 PROCEDURE — 6360000002 HC RX W HCPCS: Performed by: PHYSICIAN ASSISTANT

## 2020-02-19 PROCEDURE — 81001 URINALYSIS AUTO W/SCOPE: CPT

## 2020-02-19 PROCEDURE — 97110 THERAPEUTIC EXERCISES: CPT

## 2020-02-19 PROCEDURE — 6370000000 HC RX 637 (ALT 250 FOR IP): Performed by: PHYSICIAN ASSISTANT

## 2020-02-19 RX ADMIN — TRAMADOL HYDROCHLORIDE 50 MG: 50 TABLET, FILM COATED ORAL at 09:31

## 2020-02-19 RX ADMIN — LOSARTAN POTASSIUM 100 MG: 50 TABLET, FILM COATED ORAL at 09:29

## 2020-02-19 RX ADMIN — FUROSEMIDE 40 MG: 40 TABLET ORAL at 09:31

## 2020-02-19 RX ADMIN — CARVEDILOL 6.25 MG: 6.25 TABLET, FILM COATED ORAL at 09:30

## 2020-02-19 RX ADMIN — PHENYTOIN SODIUM 500 MG: 100 CAPSULE ORAL at 21:04

## 2020-02-19 RX ADMIN — NYSTATIN 500000 UNITS: 500000 SUSPENSION ORAL at 09:29

## 2020-02-19 RX ADMIN — FAMOTIDINE 40 MG: 20 TABLET, FILM COATED ORAL at 09:30

## 2020-02-19 RX ADMIN — POTASSIUM BICARBONATE 20 MEQ: 782 TABLET, EFFERVESCENT ORAL at 09:29

## 2020-02-19 RX ADMIN — DOCUSATE SODIUM 100 MG: 100 CAPSULE, LIQUID FILLED ORAL at 09:30

## 2020-02-19 RX ADMIN — LEVOTHYROXINE SODIUM 112 MCG: 112 TABLET ORAL at 06:22

## 2020-02-19 RX ADMIN — NYSTATIN 500000 UNITS: 500000 SUSPENSION ORAL at 13:28

## 2020-02-19 RX ADMIN — NYSTATIN 500000 UNITS: 500000 SUSPENSION ORAL at 18:33

## 2020-02-19 RX ADMIN — CETIRIZINE HYDROCHLORIDE 5 MG: 10 TABLET, FILM COATED ORAL at 09:30

## 2020-02-19 RX ADMIN — TRAMADOL HYDROCHLORIDE 50 MG: 50 TABLET, FILM COATED ORAL at 21:23

## 2020-02-19 RX ADMIN — GABAPENTIN 300 MG: 300 CAPSULE ORAL at 21:04

## 2020-02-19 RX ADMIN — MICONAZOLE NITRATE: 20 POWDER TOPICAL at 21:25

## 2020-02-19 RX ADMIN — POLYETHYLENE GLYCOL 3350 17 G: 17 POWDER, FOR SOLUTION ORAL at 09:30

## 2020-02-19 RX ADMIN — GABAPENTIN 300 MG: 300 CAPSULE ORAL at 09:30

## 2020-02-19 RX ADMIN — ENOXAPARIN SODIUM 40 MG: 40 INJECTION SUBCUTANEOUS at 09:29

## 2020-02-19 RX ADMIN — DICLOFENAC 2 G: 10 GEL TOPICAL at 21:24

## 2020-02-19 RX ADMIN — TRAMADOL HYDROCHLORIDE 50 MG: 50 TABLET, FILM COATED ORAL at 13:28

## 2020-02-19 RX ADMIN — MICONAZOLE NITRATE: 20 POWDER TOPICAL at 09:32

## 2020-02-19 RX ADMIN — Medication 10 ML: at 21:05

## 2020-02-19 RX ADMIN — DICLOFENAC 2 G: 10 GEL TOPICAL at 09:30

## 2020-02-19 RX ADMIN — NYSTATIN 500000 UNITS: 500000 SUSPENSION ORAL at 21:05

## 2020-02-19 RX ADMIN — CARVEDILOL 6.25 MG: 6.25 TABLET, FILM COATED ORAL at 18:33

## 2020-02-19 RX ADMIN — Medication 10 ML: at 09:31

## 2020-02-19 ASSESSMENT — PAIN SCALES - GENERAL
PAINLEVEL_OUTOF10: 8
PAINLEVEL_OUTOF10: 9
PAINLEVEL_OUTOF10: 10

## 2020-02-19 NOTE — PROGRESS NOTES
Unstageable wound noted on the left heel. Informed the patient to keep the feet turned and elevated off the bed. Patient noted to be on a air mattress but refuses to turn often or keep foot up on a pillow. Heel protectors placed on both feet. Patient unable to tolerate much movement or any touch to the feet in any area. Patient noted to not tolerate turning well in the bed and requires assistance times 2 or more to turn and to help assist to the bedpan. All skin folds are reddened but the patient is unable to tolerate touching those sensitive areas to clean and apply powder and will attempt often and encourage the patient to turn as often as possible and keep the left heel elevated as much as possible. Alerted staff and nursing assistants to also encourage the patient to turn and keep left heel off the bed. Will continue to monitor.

## 2020-02-19 NOTE — PROGRESS NOTES
Occupational Therapy  Facility/Department: [facility] MED SURG  Daily Treatment Note  NAME: aRmon Croft  : 1950  MRN: [id number]    Date of Service: 2020    Discharge Recommendations:  Continue to assess pending progress       Assessment   Assessment: Mutliple attempts to complete therapy session on this date. Pt declined to sit at EOB. Initiated BUE AROM in bed. Pt requested bed pan. Pt assisted with ped pan positioned properly. OT created HEP and provided to pt and reviewed and demo UE ther ex. Pt agreeable. During ther ex pt requested again for bed pan. Positioned bed pan under pt. Patient Diagnosis(es): There were no encounter diagnoses. has a past medical history of Hypertension, Orthopnea, Seizures ([address] and Thyroid disease. has a past surgical history that includes Cholecystectomy. Restrictions  Restrictions/Precautions  Restrictions/Precautions: Fall Risk, General Precautions  Required Braces or Orthoses?: No  Subjective   General  Chart Reviewed: Yes  Patient assessed for rehabilitation services?: Yes  Family / Caregiver Present: No  Referring Practitioner: GARO Young  Diagnosis: Generalized weakness, UTI,   Subjective  Subjective: Pt reports pain in BLE feet and back. Agreeable to attempt therapy services on this date.        Orientation     Objective       Type of ROM/Therapeutic Exercise  Type of ROM/Therapeutic Exercise: AROM  Exercises  Scapular Retraction: x10  Shoulder Elevation: x10  Shoulder Flexion: x10  Shoulder ABduction: x10  Horizontal ABduction: x10  Horizontal ADduction: x10                    Plan   Plan  Times per week: 3-5  Times per day: Daily  Plan weeks: 1  Current Treatment Recommendations: Strengthening, Endurance Training, Functional Mobility Training, Positioning, Safety Education & Training, Self-Care / ADL, Patient/Caregiver Education & Training    Goals  Short term goals  Time Frame for Short term goals: 1 week  Short term goal 1: Pt to come to sit at EOB with MOD I. Short term goal 2: Pt to complete toilet transfer with min assist using RW. Short term goal 3: Pt to complete toileting with MOD A. Short term goal 4: Pt to complete dressing with min assist.   Short term goal 5: pt to tolerate x15 minutes of activity to increase functional activity tolerance. Long term goals  Time Frame for Long term goals : 2 week  Long term goal 1: Pt to complete toileting with SBA. Long term goal 2: Pt to complete dressing with CGA. Long term goal 3: Pt to complete toileting with MIN A. Therapy Time   Individual Concurrent Group Co-treatment   Time In 0330         Time Out 0402         Minutes 32              This note serves as a DC summary in the event of pt discharge.      Palmira Restrepo, OTR/L

## 2020-02-19 NOTE — FLOWSHEET NOTE
02/18/20 2122   Assessment   Charting Type Shift assessment   Neurological   Neuro (WDL) WDL   Level of Consciousness 0   [place] Coma Scale   Eye Opening 4   Best Verbal Response 5   Best Motor Response 6   [place] Coma Scale Score 15   HEENT   HEENT (WDL) X   Right Eye Impaired vision   Left Eye Impaired vision   Teeth Missing teeth   Respiratory   Respiratory (WDL) WDL   Respiratory Pattern Regular   Respiratory Depth Normal   Respiratory Quality/Effort Unlabored   Chest Assessment Chest expansion symmetrical;Trachea midline   L Breath Sounds Diminished;End Expiratory Wheezes   R Breath Sounds Diminished;End Expiratory Wheezes   Breath Sounds   Right Upper Lobe Diminished;End Expiratory Wheezes   Right Middle Lobe Diminished   Right Lower Lobe Diminished   Left Upper Lobe Diminished;End Expiratory Wheezes   Left Lower Lobe Diminished   Cardiac   Cardiac (WDL) WDL   Cardiac Monitor   Telemetry Monitor On No   Gastrointestinal   Abdominal (WDL) X   RUQ Bowel Sounds Active   LUQ Bowel Sounds Active   RLQ Bowel Sounds Active   LLQ Bowel Sounds Active   Abdomen Inspection Rotund   Tenderness Soft;Nontender   Peripheral Vascular   Peripheral Vascular (WDL) X   Edema Right lower extremity; Left lower extremity;Generalized   Edema Generalized Non-pitting   RLE Edema +2;Pitting   LLE Edema +2;Pitting   RUE Neurovascular Assessment   Capillary Refill Less than/equal to 3 seconds   Color Pink   Temperature Warm   LUE Neurovascular Assessment   Capillary Refill Less than/equal to 3 seconds   Color Pink   Temperature Warm   RLE Neurovascular Assessment   Capillary Refill Less than/equal to 3 seconds   Color Pink   Temperature Warm   LLE Neurovascular Assessment   Capillary Refill Less than/equal to 3 seconds   Color Pink   Temperature Warm   Skin Color/Condition   Skin Color/Condition (WDL) X   Skin Color Pale   Skin Condition/Temp Dry;Flaky; Swollen; Warm   Skin Integrity   Skin Integrity (WDL) X   Skin Integrity

## 2020-02-20 PROCEDURE — 2580000003 HC RX 258: Performed by: PHYSICIAN ASSISTANT

## 2020-02-20 PROCEDURE — 1200000002 HC SEMI PRIVATE SWING BED

## 2020-02-20 PROCEDURE — 97530 THERAPEUTIC ACTIVITIES: CPT

## 2020-02-20 PROCEDURE — 6370000000 HC RX 637 (ALT 250 FOR IP): Performed by: PHYSICIAN ASSISTANT

## 2020-02-20 PROCEDURE — 2500000003 HC RX 250 WO HCPCS: Performed by: PHYSICIAN ASSISTANT

## 2020-02-20 PROCEDURE — 97110 THERAPEUTIC EXERCISES: CPT

## 2020-02-20 PROCEDURE — 6360000002 HC RX W HCPCS: Performed by: PHYSICIAN ASSISTANT

## 2020-02-20 RX ORDER — GUAIFENESIN/DEXTROMETHORPHAN 100-10MG/5
5 SYRUP ORAL EVERY 4 HOURS PRN
Status: DISCONTINUED | OUTPATIENT
Start: 2020-02-20 | End: 2020-03-13 | Stop reason: HOSPADM

## 2020-02-20 RX ORDER — IPRATROPIUM BROMIDE AND ALBUTEROL SULFATE 2.5; .5 MG/3ML; MG/3ML
1 SOLUTION RESPIRATORY (INHALATION)
Status: DISCONTINUED | OUTPATIENT
Start: 2020-02-20 | End: 2020-02-21

## 2020-02-20 RX ORDER — ONDANSETRON 4 MG/1
4 TABLET, FILM COATED ORAL EVERY 8 HOURS PRN
Status: DISCONTINUED | OUTPATIENT
Start: 2020-02-20 | End: 2020-03-13 | Stop reason: HOSPADM

## 2020-02-20 RX ADMIN — MICONAZOLE NITRATE: 20 POWDER TOPICAL at 22:16

## 2020-02-20 RX ADMIN — GABAPENTIN 300 MG: 300 CAPSULE ORAL at 22:15

## 2020-02-20 RX ADMIN — NYSTATIN 500000 UNITS: 500000 SUSPENSION ORAL at 22:15

## 2020-02-20 RX ADMIN — PHENYTOIN SODIUM 500 MG: 100 CAPSULE ORAL at 22:15

## 2020-02-20 RX ADMIN — FUROSEMIDE 40 MG: 40 TABLET ORAL at 09:13

## 2020-02-20 RX ADMIN — DICLOFENAC 2 G: 10 GEL TOPICAL at 22:15

## 2020-02-20 RX ADMIN — TRAMADOL HYDROCHLORIDE 50 MG: 50 TABLET, FILM COATED ORAL at 22:15

## 2020-02-20 RX ADMIN — NYSTATIN 500000 UNITS: 500000 SUSPENSION ORAL at 17:51

## 2020-02-20 RX ADMIN — POLYETHYLENE GLYCOL 3350 17 G: 17 POWDER, FOR SOLUTION ORAL at 09:14

## 2020-02-20 RX ADMIN — LOSARTAN POTASSIUM 100 MG: 50 TABLET, FILM COATED ORAL at 09:15

## 2020-02-20 RX ADMIN — Medication 10 ML: at 09:15

## 2020-02-20 RX ADMIN — MICONAZOLE NITRATE: 20 POWDER TOPICAL at 09:21

## 2020-02-20 RX ADMIN — ENOXAPARIN SODIUM 40 MG: 40 INJECTION SUBCUTANEOUS at 09:15

## 2020-02-20 RX ADMIN — DICLOFENAC 2 G: 10 GEL TOPICAL at 09:15

## 2020-02-20 RX ADMIN — Medication 10 ML: at 22:16

## 2020-02-20 RX ADMIN — POTASSIUM BICARBONATE 20 MEQ: 782 TABLET, EFFERVESCENT ORAL at 09:14

## 2020-02-20 RX ADMIN — GABAPENTIN 300 MG: 300 CAPSULE ORAL at 09:14

## 2020-02-20 RX ADMIN — NYSTATIN 500000 UNITS: 500000 SUSPENSION ORAL at 09:13

## 2020-02-20 RX ADMIN — FAMOTIDINE 40 MG: 20 TABLET, FILM COATED ORAL at 09:14

## 2020-02-20 RX ADMIN — CETIRIZINE HYDROCHLORIDE 5 MG: 10 TABLET, FILM COATED ORAL at 09:13

## 2020-02-20 RX ADMIN — CARVEDILOL 6.25 MG: 6.25 TABLET, FILM COATED ORAL at 09:14

## 2020-02-20 RX ADMIN — LEVOTHYROXINE SODIUM 112 MCG: 112 TABLET ORAL at 05:42

## 2020-02-20 RX ADMIN — DOCUSATE SODIUM 100 MG: 100 CAPSULE, LIQUID FILLED ORAL at 09:14

## 2020-02-20 RX ADMIN — CARVEDILOL 6.25 MG: 6.25 TABLET, FILM COATED ORAL at 17:51

## 2020-02-20 RX ADMIN — TRAMADOL HYDROCHLORIDE 50 MG: 50 TABLET, FILM COATED ORAL at 09:13

## 2020-02-20 ASSESSMENT — PAIN SCALES - GENERAL
PAINLEVEL_OUTOF10: 7
PAINLEVEL_OUTOF10: 9
PAINLEVEL_OUTOF10: 5

## 2020-02-20 NOTE — PROGRESS NOTES
Patient asked when she was going to be discharged home. Spoke with Jenny Bianchi and Adeel about the patient going home. Discussed with the patient about having someone at home that can safely take care of her at home. The patient stated \" My son said I couldn't come home today he is trying to get everything ready at home a hospital bed, lift, and a  bedside commode. \" Notified Prince Waters  of this. Also, the patient requested something for cough and nausea. Notified William WYMAN of this.

## 2020-02-20 NOTE — PROGRESS NOTES
& Training, Self-Care / ADL, Patient/Caregiver Education & Training    Goals  Short term goals  Time Frame for Short term goals: 1 week  Short term goal 1: Pt to come to sit at EOB with MOD I. Short term goal 2: Pt to complete toilet transfer with min assist using RW. Short term goal 3: Pt to complete toileting with MOD A. Short term goal 4: Pt to complete dressing with min assist.   Short term goal 5: pt to tolerate x15 minutes of activity to increase functional activity tolerance. Long term goals  Time Frame for Long term goals : 2 week  Long term goal 1: Pt to complete toileting with SBA. Long term goal 2: Pt to complete dressing with CGA. Long term goal 3: Pt to complete toileting with MIN A. Therapy Time   Individual Concurrent Group Co-treatment   Time In 1030         Time Out 1108         Minutes 38              This note serves as a DC summary in the event of pt discharge.      Palmira Restrepo, OTR/L

## 2020-02-20 NOTE — FLOWSHEET NOTE
Integrity   Skin Integrity (WDL) X   Skin Integrity Other (Comment); Redness  (BLE raised dry, flaky bumpy areas)   Location BLE   Preventative Dressing No   Assessed this shift? Yes   Skin Fold Management Yes   Dressing Site Abdominal pannus   Treatment Pharmaceutical   Date applied? 02/19/20   Assessed this shift Red;Moist   Skin Integrity Site 2   Skin Integrity Location 2 Redness; Tear   Location 2 buttocks   Preventative Dressing No   Musculoskeletal   Musculoskeletal (WDL) X   RUE Limited movement   LUE Limited movement   RL Extremity Limited movement;Weakness; Swelling   LL Extremity Limited movement;Weakness; Swelling   Genitourinary   Genitourinary (WDL) X   Flank Tenderness No   Suprapubic Tenderness No   Dysuria No   Urine Assessment   Incontinence Yes  (at times)   Urine Color Yellow/straw   Urine Appearance Clear   Psychosocial   Psychosocial (WDL) WDL

## 2020-02-20 NOTE — CARE COORDINATION
Insurance denied bed for home, BSC, WC per Geeta at [facility]. She notified family yesterday by phone call. Snml-[id number] available at [phone].

## 2020-02-20 NOTE — PROGRESS NOTES
Physical Therapy  Facility/Department: [facility] MED SURG  Daily Treatment Note  NAME: Bhanu Lam  : 1950  MRN: [id number]    Date of Service: 2020    Discharge Recommendations:  Continue to assess pending progress        Assessment   Body structures, Functions, Activity limitations: Decreased functional mobility ; Decreased high-level IADLs;Decreased ROM; Decreased strength; Increased pain  Assessment: Co treat with OT was performed today. Pt required frequent encouragement today for sitting EOB and performing therex. She was able to complete the treatment but did not sit EOB as long as earlier this week. PT and OT educated pt on importance of participation and motivation to improve mobility. Treatment Diagnosis: functional decline  Prognosis: Fair  Decision Making: Medium Complexity  REQUIRES PT FOLLOW UP: Yes  Activity Tolerance  Activity Tolerance: Patient Tolerated treatment well;Patient limited by endurance  Activity Tolerance: limited due to morbid obesity     Patient Diagnosis(es): There were no encounter diagnoses. has a past medical history of Hypertension, Orthopnea, Seizures ([address] and Thyroid disease. has a past surgical history that includes Cholecystectomy. Restrictions  Restrictions/Precautions  Restrictions/Precautions: Fall Risk, General Precautions  Required Braces or Orthoses?: No     Subjective   General  Chart Reviewed: Yes  Family / Caregiver Present: No  Referring Practitioner: Earlene Quinones PA-C  Subjective  Subjective: Pt presents supine in bed, she is agreeable to work with PT / OT although states she doesn't think she can sit on EOB. After encouragement pt is agreeable.             Orientation  Orientation  Overall Orientation Status: Within Normal Limits    Objective   Bed mobility  Rolling to Left: Moderate assistance  Rolling to Right: Moderate assistance  Supine to Sit: Maximum assistance  Sit to Supine: Maximum assistance  Scooting: Maximal

## 2020-02-20 NOTE — CARE COORDINATION
Interdisciplinary rounding completed. Nelsy (provider rep), Thang Hernandez (), Anju Winn (nursing), Rufus (PT), Palmira (OT), Saba Frederick (pharmacy), and Nicole (dietitian) all involved. Activities reviewed with OT.      lived with family. They are refusing to let her come back. Multiple NH denials. APS consulted. pt has little to no participation continues with mod - max assist with ADLs. Can feed self with set up and opening containers. Pt wt: 396.1lb; pt on a low sodium diet. Has ONS ordered BID, has +2 edema and on lasix. Pt has skin tear. Eating 54-79% of meals. Pt is not on Abx and receiving  DVT prophyalxis - Enoxaparin 40mg APS was here to see pt at [facility]. No NH have accepted at this time and family is refusing to care for her. Will monitor and keep in touch with APS.

## 2020-02-21 PROCEDURE — 94640 AIRWAY INHALATION TREATMENT: CPT

## 2020-02-21 PROCEDURE — 2580000003 HC RX 258: Performed by: PHYSICIAN ASSISTANT

## 2020-02-21 PROCEDURE — 1200000002 HC SEMI PRIVATE SWING BED

## 2020-02-21 PROCEDURE — 97803 MED NUTRITION INDIV SUBSEQ: CPT

## 2020-02-21 PROCEDURE — 6370000000 HC RX 637 (ALT 250 FOR IP): Performed by: PHYSICIAN ASSISTANT

## 2020-02-21 PROCEDURE — 6360000002 HC RX W HCPCS: Performed by: PHYSICIAN ASSISTANT

## 2020-02-21 PROCEDURE — 97110 THERAPEUTIC EXERCISES: CPT

## 2020-02-21 RX ORDER — IPRATROPIUM BROMIDE AND ALBUTEROL SULFATE 2.5; .5 MG/3ML; MG/3ML
1 SOLUTION RESPIRATORY (INHALATION) EVERY 4 HOURS PRN
Status: DISCONTINUED | OUTPATIENT
Start: 2020-02-21 | End: 2020-03-13 | Stop reason: HOSPADM

## 2020-02-21 RX ADMIN — GUAIFENESIN AND DEXTROMETHORPHAN 5 ML: 100; 10 SYRUP ORAL at 08:55

## 2020-02-21 RX ADMIN — CARVEDILOL 6.25 MG: 6.25 TABLET, FILM COATED ORAL at 16:13

## 2020-02-21 RX ADMIN — LEVOTHYROXINE SODIUM 112 MCG: 112 TABLET ORAL at 05:40

## 2020-02-21 RX ADMIN — TRAMADOL HYDROCHLORIDE 50 MG: 50 TABLET, FILM COATED ORAL at 20:59

## 2020-02-21 RX ADMIN — MICONAZOLE NITRATE: 20 POWDER TOPICAL at 08:57

## 2020-02-21 RX ADMIN — Medication 10 ML: at 21:00

## 2020-02-21 RX ADMIN — TRAMADOL HYDROCHLORIDE 50 MG: 50 TABLET, FILM COATED ORAL at 09:01

## 2020-02-21 RX ADMIN — FUROSEMIDE 40 MG: 40 TABLET ORAL at 08:55

## 2020-02-21 RX ADMIN — LOSARTAN POTASSIUM 100 MG: 50 TABLET, FILM COATED ORAL at 08:55

## 2020-02-21 RX ADMIN — MICONAZOLE NITRATE: 20 POWDER TOPICAL at 21:00

## 2020-02-21 RX ADMIN — DICLOFENAC 2 G: 10 GEL TOPICAL at 20:59

## 2020-02-21 RX ADMIN — PHENYTOIN SODIUM 500 MG: 100 CAPSULE ORAL at 20:59

## 2020-02-21 RX ADMIN — CARVEDILOL 6.25 MG: 6.25 TABLET, FILM COATED ORAL at 08:55

## 2020-02-21 RX ADMIN — NYSTATIN 500000 UNITS: 500000 SUSPENSION ORAL at 16:13

## 2020-02-21 RX ADMIN — IPRATROPIUM BROMIDE AND ALBUTEROL SULFATE 1 AMPULE: .5; 3 SOLUTION RESPIRATORY (INHALATION) at 17:59

## 2020-02-21 RX ADMIN — NYSTATIN 500000 UNITS: 500000 SUSPENSION ORAL at 20:59

## 2020-02-21 RX ADMIN — FAMOTIDINE 40 MG: 20 TABLET, FILM COATED ORAL at 08:55

## 2020-02-21 RX ADMIN — POTASSIUM BICARBONATE 20 MEQ: 782 TABLET, EFFERVESCENT ORAL at 08:55

## 2020-02-21 RX ADMIN — ENOXAPARIN SODIUM 40 MG: 40 INJECTION SUBCUTANEOUS at 08:56

## 2020-02-21 RX ADMIN — POLYETHYLENE GLYCOL 3350 17 G: 17 POWDER, FOR SOLUTION ORAL at 08:56

## 2020-02-21 RX ADMIN — GUAIFENESIN AND DEXTROMETHORPHAN 5 ML: 100; 10 SYRUP ORAL at 16:13

## 2020-02-21 RX ADMIN — NYSTATIN 500000 UNITS: 500000 SUSPENSION ORAL at 08:55

## 2020-02-21 RX ADMIN — DOCUSATE SODIUM 100 MG: 100 CAPSULE, LIQUID FILLED ORAL at 08:55

## 2020-02-21 RX ADMIN — DICLOFENAC 2 G: 10 GEL TOPICAL at 08:56

## 2020-02-21 RX ADMIN — CETIRIZINE HYDROCHLORIDE 5 MG: 10 TABLET, FILM COATED ORAL at 08:55

## 2020-02-21 RX ADMIN — GABAPENTIN 300 MG: 300 CAPSULE ORAL at 08:55

## 2020-02-21 RX ADMIN — GABAPENTIN 300 MG: 300 CAPSULE ORAL at 20:59

## 2020-02-21 RX ADMIN — Medication 10 ML: at 08:56

## 2020-02-21 ASSESSMENT — PAIN SCALES - GENERAL
PAINLEVEL_OUTOF10: 6
PAINLEVEL_OUTOF10: 6
PAINLEVEL_OUTOF10: 9

## 2020-02-21 ASSESSMENT — PAIN DESCRIPTION - LOCATION: LOCATION: LEG;FOOT

## 2020-02-21 ASSESSMENT — PAIN DESCRIPTION - ORIENTATION: ORIENTATION: LEFT

## 2020-02-21 ASSESSMENT — PAIN DESCRIPTION - PAIN TYPE: TYPE: CHRONIC PAIN

## 2020-02-21 NOTE — FLOWSHEET NOTE
02/20/20 2211   Assessment   Charting Type Shift assessment   Neurological   Neuro (WDL) WDL   Level of Consciousness 0   [place] Coma Scale   Eye Opening 4   Best Verbal Response 5   Best Motor Response 6   [place] Coma Scale Score 15   HEENT   HEENT (WDL) X   Right Eye Impaired vision   Left Eye Impaired vision   Teeth Missing teeth   Respiratory   Respiratory (WDL) WDL   Respiratory Pattern Regular   Respiratory Depth Normal   Respiratory Quality/Effort Unlabored   Chest Assessment Chest expansion symmetrical;Trachea midline   L Breath Sounds Diminished;End Expiratory Wheezes   R Breath Sounds Diminished;End Expiratory Wheezes   Breath Sounds   Right Upper Lobe Diminished;End Expiratory Wheezes   Right Middle Lobe Diminished   Right Lower Lobe Diminished   Left Upper Lobe Diminished;End Expiratory Wheezes   Left Lower Lobe Diminished   Cough/Sputum   Cough Dry;Strong   Cardiac   Cardiac (WDL) WDL   Cardiac Monitor   Telemetry Monitor On No   Gastrointestinal   Abdominal (WDL) X   RUQ Bowel Sounds Active   LUQ Bowel Sounds Active   RLQ Bowel Sounds Active   LLQ Bowel Sounds Active   Abdomen Inspection Rotund   Last BM (including prior to admit) 02/19/20   Tenderness Soft;Nontender   Peripheral Vascular   Peripheral Vascular (WDL) X   Edema Right lower extremity; Left lower extremity;Generalized   Edema Generalized Non-pitting   RLE Edema +3;Pitting   LLE Edema +3;Pitting   RUE Neurovascular Assessment   Capillary Refill Less than/equal to 3 seconds   Color Pink   Temperature Warm   LUE Neurovascular Assessment   Capillary Refill Less than/equal to 3 seconds   Color Pink   Temperature Warm   RLE Neurovascular Assessment   Capillary Refill Less than/equal to 3 seconds   Color Pink   Temperature Warm   LLE Neurovascular Assessment   Capillary Refill Less than/equal to 3 seconds   Color Pink   Temperature Warm   Skin Color/Condition   Skin Color/Condition (WDL) X   Skin Color Pale   Skin Condition/Temp Swollen;Flaky Skin Integrity   Skin Integrity (WDL) X   Skin Integrity Other (Comment)   Location BLE   Preventative Dressing No   Assessed this shift? Yes   Skin Fold Management Yes   Dressing Site Abdominal pannus   Treatment Pharmaceutical   Date applied? 02/20/20   Assessed this shift Red;Moist   Skin Integrity Site 2   Skin Integrity Location 2 Other (Comment)  (stage 2)   Location 2 buttocks   Preventative Dressing Yes   Musculoskeletal   Musculoskeletal (WDL) X   RUE Limited movement   LUE Limited movement   RL Extremity Limited movement;Weakness; Swelling   LL Extremity Limited movement;Weakness; Swelling   Genitourinary   Genitourinary (WDL) X   Flank Tenderness No   Suprapubic Tenderness No   Dysuria No   Urine Assessment   Incontinence   (f/c)   Urine Color Yellow/straw   Urine Appearance Clear   Psychosocial   Psychosocial (WDL) WDL

## 2020-02-21 NOTE — PROGRESS NOTES
Physical Therapy  Facility/Department: [facility] MED SURG  Daily Treatment Note  NAME: Darling Russell  : 1950  MRN: [id number]    Date of Service: 2020    Discharge Recommendations:  Continue to assess pending progress        Assessment   Body structures, Functions, Activity limitations: Decreased functional mobility ; Decreased high-level IADLs;Decreased ROM; Decreased strength; Increased pain  Assessment: functional decline; minimal participation overall; was encourage to do LE exercises 3-5 x day  Treatment Diagnosis: functional decline  REQUIRES PT FOLLOW UP: Yes  Activity Tolerance  Activity Tolerance: Patient Tolerated treatment well;Patient limited by endurance  Activity Tolerance: limited due to morbid obesity     Patient Diagnosis(es): There were no encounter diagnoses. has a past medical history of Hypertension, Orthopnea, Seizures ([address] and Thyroid disease. has a past surgical history that includes Cholecystectomy.     Restrictions  Restrictions/Precautions  Restrictions/Precautions: Fall Risk, General Precautions  Required Braces or Orthoses?: No  Subjective   General  Chart Reviewed: Yes  Family / Caregiver Present: No  Referring Practitioner: Jill Douglas PA-C  Subjective  Subjective: Pt presents supine in bed, she is agreeable to do bed exercises but not get up; c/o left foot/leg pain  Pain Screening  Patient Currently in Pain: Yes  Pain Assessment  Pain Assessment: 0-10  Pain Level: 6  Pain Location: Leg;Foot  Pain Orientation: Left  Vital Signs  Patient Currently in Pain: Yes       Orientation  Orientation  Overall Orientation Status: Within Normal Limits  Cognition      Objective                  Exercises  Straight Leg Raise: AAROM - 1 x 10 B  Quad Sets: 20x B  Gluteal Sets: 20x B  Hip Abduction: 20x B  Ankle Pumps: 20x B                     Goals  Short term goals  Time Frame for Short term goals: 3 days  Short term goal 1: Pt to be able to sit EOB x SBA  Short term goal 2: Pt tolerate supine or sitting therex x10-20 reps to improve LE ROM and strength  Short term goal 3: Standing and transfer ability to be assessed.    Short term goal 4: Pt to move supine to sit x Min A    Plan    Plan  Times per week: 4-5 days per week  Plan weeks: 1  Current Treatment Recommendations: Strengthening, Gait Training, Patient/Caregiver Education & Training, ROM, Equipment Evaluation, Education, & procurement, Pain Management, Positioning, Home Exercise Program, Endurance Training, Functional Mobility Training, Safety Education & Training, Transfer Training  Safety Devices  Type of devices: Left in bed, Call light within reach     Therapy Time   Individual Concurrent Group Co-treatment   Time In 0945 Craig [facility]         Minutes 10                 [place], [place]

## 2020-02-22 ENCOUNTER — APPOINTMENT (OUTPATIENT)
Dept: GENERAL RADIOLOGY | Facility: HOSPITAL | Age: 70
DRG: 948 | End: 2020-02-22
Attending: INTERNAL MEDICINE
Payer: MEDICAID

## 2020-02-22 PROCEDURE — 1200000002 HC SEMI PRIVATE SWING BED

## 2020-02-22 PROCEDURE — 6370000000 HC RX 637 (ALT 250 FOR IP): Performed by: PHYSICIAN ASSISTANT

## 2020-02-22 PROCEDURE — 6360000002 HC RX W HCPCS: Performed by: PHYSICIAN ASSISTANT

## 2020-02-22 PROCEDURE — 71045 X-RAY EXAM CHEST 1 VIEW: CPT

## 2020-02-22 PROCEDURE — 2580000003 HC RX 258: Performed by: PHYSICIAN ASSISTANT

## 2020-02-22 RX ADMIN — MICONAZOLE NITRATE: 20 POWDER TOPICAL at 08:52

## 2020-02-22 RX ADMIN — PHENYTOIN SODIUM 500 MG: 100 CAPSULE ORAL at 20:36

## 2020-02-22 RX ADMIN — FUROSEMIDE 40 MG: 40 TABLET ORAL at 08:50

## 2020-02-22 RX ADMIN — DICLOFENAC 2 G: 10 GEL TOPICAL at 20:36

## 2020-02-22 RX ADMIN — LEVOTHYROXINE SODIUM 112 MCG: 112 TABLET ORAL at 06:20

## 2020-02-22 RX ADMIN — CETIRIZINE HYDROCHLORIDE 5 MG: 10 TABLET, FILM COATED ORAL at 08:50

## 2020-02-22 RX ADMIN — FAMOTIDINE 40 MG: 20 TABLET, FILM COATED ORAL at 08:49

## 2020-02-22 RX ADMIN — MICONAZOLE NITRATE: 20 POWDER TOPICAL at 20:39

## 2020-02-22 RX ADMIN — POTASSIUM BICARBONATE 20 MEQ: 782 TABLET, EFFERVESCENT ORAL at 08:50

## 2020-02-22 RX ADMIN — LOSARTAN POTASSIUM 100 MG: 50 TABLET, FILM COATED ORAL at 08:49

## 2020-02-22 RX ADMIN — CARVEDILOL 6.25 MG: 6.25 TABLET, FILM COATED ORAL at 16:49

## 2020-02-22 RX ADMIN — NYSTATIN 500000 UNITS: 500000 SUSPENSION ORAL at 08:58

## 2020-02-22 RX ADMIN — CARVEDILOL 6.25 MG: 6.25 TABLET, FILM COATED ORAL at 08:50

## 2020-02-22 RX ADMIN — Medication 10 ML: at 08:59

## 2020-02-22 RX ADMIN — NYSTATIN 500000 UNITS: 500000 SUSPENSION ORAL at 20:36

## 2020-02-22 RX ADMIN — GABAPENTIN 300 MG: 300 CAPSULE ORAL at 20:37

## 2020-02-22 RX ADMIN — DOCUSATE SODIUM 100 MG: 100 CAPSULE, LIQUID FILLED ORAL at 08:50

## 2020-02-22 RX ADMIN — ENOXAPARIN SODIUM 40 MG: 40 INJECTION SUBCUTANEOUS at 08:51

## 2020-02-22 RX ADMIN — DICLOFENAC 2 G: 10 GEL TOPICAL at 08:51

## 2020-02-22 RX ADMIN — TRAMADOL HYDROCHLORIDE 50 MG: 50 TABLET, FILM COATED ORAL at 20:36

## 2020-02-22 RX ADMIN — GABAPENTIN 300 MG: 300 CAPSULE ORAL at 08:50

## 2020-02-22 RX ADMIN — NYSTATIN 500000 UNITS: 500000 SUSPENSION ORAL at 16:49

## 2020-02-22 ASSESSMENT — PAIN SCALES - GENERAL: PAINLEVEL_OUTOF10: 8

## 2020-02-22 ASSESSMENT — PAIN DESCRIPTION - PAIN TYPE: TYPE: CHRONIC PAIN

## 2020-02-22 NOTE — PROGRESS NOTES
Nurse aid reports that pt has bloody discharge and that it was enough that bed sheets had to be changed. This episode was reported to Dave Araiza. No new orders at this time.

## 2020-02-23 PROCEDURE — 6360000002 HC RX W HCPCS: Performed by: PHYSICIAN ASSISTANT

## 2020-02-23 PROCEDURE — 6370000000 HC RX 637 (ALT 250 FOR IP): Performed by: PHYSICIAN ASSISTANT

## 2020-02-23 PROCEDURE — 2580000003 HC RX 258: Performed by: PHYSICIAN ASSISTANT

## 2020-02-23 PROCEDURE — 99308 SBSQ NF CARE LOW MDM 20: CPT | Performed by: INTERNAL MEDICINE

## 2020-02-23 PROCEDURE — 1200000002 HC SEMI PRIVATE SWING BED

## 2020-02-23 RX ADMIN — CETIRIZINE HYDROCHLORIDE 5 MG: 10 TABLET, FILM COATED ORAL at 08:23

## 2020-02-23 RX ADMIN — POTASSIUM BICARBONATE 20 MEQ: 782 TABLET, EFFERVESCENT ORAL at 08:24

## 2020-02-23 RX ADMIN — PHENYTOIN SODIUM 500 MG: 100 CAPSULE ORAL at 21:26

## 2020-02-23 RX ADMIN — LEVOTHYROXINE SODIUM 112 MCG: 112 TABLET ORAL at 05:44

## 2020-02-23 RX ADMIN — FUROSEMIDE 40 MG: 40 TABLET ORAL at 08:23

## 2020-02-23 RX ADMIN — Medication 10 ML: at 08:26

## 2020-02-23 RX ADMIN — GUAIFENESIN AND DEXTROMETHORPHAN 5 ML: 100; 10 SYRUP ORAL at 21:27

## 2020-02-23 RX ADMIN — FAMOTIDINE 40 MG: 20 TABLET, FILM COATED ORAL at 08:24

## 2020-02-23 RX ADMIN — NYSTATIN 500000 UNITS: 500000 SUSPENSION ORAL at 21:27

## 2020-02-23 RX ADMIN — NYSTATIN 500000 UNITS: 500000 SUSPENSION ORAL at 08:24

## 2020-02-23 RX ADMIN — LOSARTAN POTASSIUM 100 MG: 50 TABLET, FILM COATED ORAL at 08:23

## 2020-02-23 RX ADMIN — TRAMADOL HYDROCHLORIDE 50 MG: 50 TABLET, FILM COATED ORAL at 21:27

## 2020-02-23 RX ADMIN — CARVEDILOL 6.25 MG: 6.25 TABLET, FILM COATED ORAL at 08:24

## 2020-02-23 RX ADMIN — GABAPENTIN 300 MG: 300 CAPSULE ORAL at 21:27

## 2020-02-23 RX ADMIN — GABAPENTIN 300 MG: 300 CAPSULE ORAL at 08:24

## 2020-02-23 RX ADMIN — CARVEDILOL 6.25 MG: 6.25 TABLET, FILM COATED ORAL at 16:14

## 2020-02-23 RX ADMIN — DICLOFENAC 2 G: 10 GEL TOPICAL at 21:26

## 2020-02-23 RX ADMIN — NYSTATIN 500000 UNITS: 500000 SUSPENSION ORAL at 16:14

## 2020-02-23 RX ADMIN — MICONAZOLE NITRATE: 20 POWDER TOPICAL at 21:28

## 2020-02-23 RX ADMIN — MICONAZOLE NITRATE: 20 POWDER TOPICAL at 08:27

## 2020-02-23 RX ADMIN — ACETAMINOPHEN 650 MG: 325 TABLET, FILM COATED ORAL at 00:43

## 2020-02-23 RX ADMIN — ENOXAPARIN SODIUM 40 MG: 40 INJECTION SUBCUTANEOUS at 08:25

## 2020-02-23 RX ADMIN — DOCUSATE SODIUM 100 MG: 100 CAPSULE, LIQUID FILLED ORAL at 08:23

## 2020-02-23 RX ADMIN — DICLOFENAC 2 G: 10 GEL TOPICAL at 08:25

## 2020-02-23 ASSESSMENT — PAIN DESCRIPTION - LOCATION: LOCATION: LEG;FOOT

## 2020-02-23 ASSESSMENT — PAIN DESCRIPTION - PAIN TYPE
TYPE: CHRONIC PAIN
TYPE: CHRONIC PAIN

## 2020-02-23 ASSESSMENT — PAIN SCALES - GENERAL
PAINLEVEL_OUTOF10: 6
PAINLEVEL_OUTOF10: 0
PAINLEVEL_OUTOF10: 8

## 2020-02-23 NOTE — PROGRESS NOTES
Pt with vaginal discharge that is mucous and bloody tinged in color. Patel Card [place], reports that there was enough discharge that the top sheet had to be changed. No odor from discharge reported. Pt with no complaints of pain. Dave Amador notified. Will continue to monitor.

## 2020-02-23 NOTE — PROGRESS NOTES
Progress Note      Subjective:   Chief complaint: Declining functional status    Interval History:   Patient complains of wheezing and productive cough earlier this week. Chest x-ray 2/22- for acute infectious process. She was given DuoNebs with some improvement. APS now involved as family continues to refuse to take patient home. Review of systems:     Constitutional:  Denies fever or chills. Positive for generalized weakness and fatigue  Eyes:  Denies change in visual acuity or discharge. HENT:  Denies nasal congestion or sore throat. Respiratory: Positive for cough and wheezing  Cardiovascular:  Denies chest pain, palpitation or swelling in LEs. GI:  Denies abdominal pain, nausea, vomiting, bloody stools or diarrhea. :  Denies dysuria or frequency. Musculoskeletal:  Denies back pain or joint pain. Integument:  Denies rash or itching. Neurologic:  Denies headache, focal weakness or sensory changes. Endocrine:  Denies polyuria or polydipsia. Lymphatic:  Denies swollen glands or night sweats. Psychiatric:  Denies depression or anxiety. Past medical history, surgical history, family history and social history reviewed and unchanged compared to H&P earlier this admission.     Medications:   Scheduled Meds:   docusate sodium  100 mg Oral Daily    polyethylene glycol  17 g Oral Daily    enoxaparin  40 mg Subcutaneous Daily    sodium chloride flush  10 mL Intravenous 2 times per day    carvedilol  6.25 mg Oral BID WC    cetirizine  5 mg Oral Daily    diclofenac sodium  2 g Topical BID    famotidine  40 mg Oral Daily    furosemide  40 mg Oral Daily    gabapentin  300 mg Oral BID    levothyroxine  112 mcg Oral Daily    lidocaine  2 patch Transdermal Daily    losartan  100 mg Oral Daily    miconazole   Topical BID    nystatin  5 mL Oral 4x Daily    phenytoin  500 mg Oral Nightly    potassium bicarb-citric acid  20 mEq Oral Daily     Continuous Infusions:    Objective:     Vital Signs  Temp: 98.6 °F (37 °C)  Pulse: 73  Resp: 24  BP: (!) 141/49  SpO2: 91 %  O2 Device: None (Room air)       Vital signs reviewed in electronic charts. Physical exam  Constitutional:  Well developed,  morbidly obese, no acute distress  Eyes:  PERRL, no scleral icterus, conjunctiva normal   HENT:  Atraumatic, external ears normal, nose normal, oropharynx moist, no pharyngeal exudates. Neck- supple, no JVD, no lymphadenopathy  Respiratory: Expiratory wheezing noted. No respiratory distress on room air, rales or rhonchi detected  Cardiovascular:  Normal rate, normal rhythm, no murmurs, no gallops, no rubs   GI:  Morbidly obese, Soft, nondistended, normal bowel sounds, nontender, no voluntary guarding  Musculoskeletal:  No cyanosis. Moving all extremities. Decreased strength bilateral lower extremities but she is able to move BLE/feet independently. Dorsal aspect Bilateral Feet tender to mild palpation. No tenderness to spine. Chronic lymphedema noted bilateral lower extremities  Integument:  Warm and dry. Chronic stasis changes and lymphedema to lower extremities. No redness or bruising noted to lower extremities or feet. No wounds to heels or feet. Stage II wounds to buttocks. small bruise noted to lower mid back. Neurologic:  Alert & oriented x 3, no apparent focal deficits noted , answers all questions appropriately, dorsiflexion and plantar flexion ble intact, no foot drop   Psychiatric:  Speech and behavior appropriate     Results:     Lab Results   Component Value Date    WBC 9.9 02/14/2020    HGB 9.3 (L) 02/14/2020    HCT 30.6 (L) 02/14/2020    MCV 95.3 02/14/2020     02/14/2020       Lab Results   Component Value Date     02/14/2020    K 3.6 02/14/2020    K 3.8 02/13/2020     02/14/2020    CO2 30 02/14/2020    BUN 23 02/14/2020    CREATININE 0.8 02/14/2020    GLUCOSE 107 02/14/2020    CALCIUM 8.3 02/14/2020        Assessment and Plan:      Active Hospital Problems    Diagnosis Date Noted    Benign essential HTN [I10]  BP stable on home regimen. Will continue. 02/16/2020    Spinal stenosis of lumbar region without neurogenic claudication [M48.061]  Severe spinal stenosis at L4/L5. Doug Salas PA-C discussed case with neurosurgery on-call at [facility] on 2/15 Dr. Jesus Maldonado who recommended outpatient MRI and outpatient neurosurgery follow-up. Doug Salas PA-C also discussed case with Dr. Maryam Bobo with [facility] medicine who did not feel patient was appropriate for transfer to higher level of care. 02/15/2020    Low back pain [M54.5]  Secondary to the above   02/14/2020    Anemia [D64.9]  Hemoglobin stable. Iron, B12, folate within normal limits. Patient would benefit from screening upper and lower endoscopies as outpatient. Defer to PCP.   02/14/2020    Declining functional status [R53.81]  Has not walked independently in years. Spent most of her time in a recliner prior to admission however her recliner recently broke and she cannot afford a new one. Treated for acute UTI with no improvement of functional status. No evidence of reversible causes as magnesium, B12, folate and TSH were within normal limits. Patient is morbidly obese and this complicates all aspects of care. Her chronic lymphedema also complicates her care. Patient has been refusing PT and OT while inpatient. Family refuses to take patient home. APS involved. Patient will likely need guardianship then placement. 02/13/2020    Lymphedema of both lower extremities [I89.0]  Bilateral lower extremity venous duplex is negative for DVT. Continue home Lasix. Continue gabapentin for neuropathic pain. 02/13/2020    Wound of buttock, initial encounter [S32.058T]  Encourage offloading. Stage II. Wound care. 02/13/2020    Morbid obesity ([facility] [E66.01]  BMI 60.3. Complicates all aspects of care.   Counseled on diet and exercise after recovery   02/13/2020    Seizure disorder ([facility] [A78.696]  Continue home

## 2020-02-24 PROCEDURE — 6370000000 HC RX 637 (ALT 250 FOR IP): Performed by: PHYSICIAN ASSISTANT

## 2020-02-24 PROCEDURE — 6360000002 HC RX W HCPCS: Performed by: PHYSICIAN ASSISTANT

## 2020-02-24 PROCEDURE — 97530 THERAPEUTIC ACTIVITIES: CPT

## 2020-02-24 PROCEDURE — 2580000003 HC RX 258: Performed by: PHYSICIAN ASSISTANT

## 2020-02-24 PROCEDURE — 1200000002 HC SEMI PRIVATE SWING BED

## 2020-02-24 RX ADMIN — CARVEDILOL 6.25 MG: 6.25 TABLET, FILM COATED ORAL at 16:39

## 2020-02-24 RX ADMIN — GABAPENTIN 300 MG: 300 CAPSULE ORAL at 09:36

## 2020-02-24 RX ADMIN — NYSTATIN 500000 UNITS: 500000 SUSPENSION ORAL at 12:42

## 2020-02-24 RX ADMIN — DICLOFENAC 2 G: 10 GEL TOPICAL at 09:37

## 2020-02-24 RX ADMIN — LEVOTHYROXINE SODIUM 112 MCG: 112 TABLET ORAL at 05:59

## 2020-02-24 RX ADMIN — FUROSEMIDE 40 MG: 40 TABLET ORAL at 09:36

## 2020-02-24 RX ADMIN — TRAMADOL HYDROCHLORIDE 50 MG: 50 TABLET, FILM COATED ORAL at 21:50

## 2020-02-24 RX ADMIN — FAMOTIDINE 40 MG: 20 TABLET, FILM COATED ORAL at 09:37

## 2020-02-24 RX ADMIN — NYSTATIN 500000 UNITS: 500000 SUSPENSION ORAL at 09:36

## 2020-02-24 RX ADMIN — TRAMADOL HYDROCHLORIDE 50 MG: 50 TABLET, FILM COATED ORAL at 16:42

## 2020-02-24 RX ADMIN — DICLOFENAC 2 G: 10 GEL TOPICAL at 20:55

## 2020-02-24 RX ADMIN — GABAPENTIN 300 MG: 300 CAPSULE ORAL at 20:57

## 2020-02-24 RX ADMIN — PHENYTOIN SODIUM 500 MG: 100 CAPSULE ORAL at 20:56

## 2020-02-24 RX ADMIN — MICONAZOLE NITRATE: 20 POWDER TOPICAL at 20:56

## 2020-02-24 RX ADMIN — CARVEDILOL 6.25 MG: 6.25 TABLET, FILM COATED ORAL at 09:37

## 2020-02-24 RX ADMIN — CETIRIZINE HYDROCHLORIDE 5 MG: 10 TABLET, FILM COATED ORAL at 09:37

## 2020-02-24 RX ADMIN — Medication 10 ML: at 20:58

## 2020-02-24 RX ADMIN — NYSTATIN 500000 UNITS: 500000 SUSPENSION ORAL at 16:39

## 2020-02-24 RX ADMIN — NYSTATIN 500000 UNITS: 500000 SUSPENSION ORAL at 20:56

## 2020-02-24 RX ADMIN — ENOXAPARIN SODIUM 40 MG: 40 INJECTION SUBCUTANEOUS at 09:36

## 2020-02-24 RX ADMIN — LOSARTAN POTASSIUM 100 MG: 50 TABLET, FILM COATED ORAL at 09:37

## 2020-02-24 RX ADMIN — POTASSIUM BICARBONATE 20 MEQ: 782 TABLET, EFFERVESCENT ORAL at 09:36

## 2020-02-24 ASSESSMENT — PAIN DESCRIPTION - ORIENTATION: ORIENTATION: RIGHT;LEFT

## 2020-02-24 ASSESSMENT — PAIN SCALES - GENERAL
PAINLEVEL_OUTOF10: 10
PAINLEVEL_OUTOF10: 0
PAINLEVEL_OUTOF10: 10

## 2020-02-24 ASSESSMENT — PAIN DESCRIPTION - LOCATION: LOCATION: FOOT

## 2020-02-24 NOTE — PROGRESS NOTES
term goal 4: Pt to complete dressing with min assist.   Short term goal 5: pt to tolerate x15 minutes of activity to increase functional activity tolerance. Long term goals  Time Frame for Long term goals : 2 week  Long term goal 1: Pt to complete toileting with SBA. Long term goal 2: Pt to complete dressing with CGA. Long term goal 3: Pt to complete toileting with MIN A. Therapy Time   Individual Concurrent Group Co-treatment   Time In 0130         Time Out 0155         Minutes 25              This note serves as a DC summary in the event of pt discharge.      Palmira Restrepo, OTR/L

## 2020-02-24 NOTE — PROGRESS NOTES
Physical Therapy  Facility/Department: [facility] MED SURG  Daily Treatment Note  NAME: Jerman Beard  : 1950  MRN: [id number]    Date of Service: 2020    Discharge Recommendations:  Continue to assess pending progress        Assessment   Body structures, Functions, Activity limitations: Decreased functional mobility ; Decreased high-level IADLs;Decreased ROM; Decreased strength; Increased pain  Assessment: Pt was able to move supine to sit with greater ease today requiring less assistance. Pt was able to stand x 2 trials today for short periods. Pt participated well today and was able to advance with tolerance to functional mobility. Treatment Diagnosis: functional decline  Prognosis: Fair  Decision Making: Medium Complexity  REQUIRES PT FOLLOW UP: Yes  Activity Tolerance  Activity Tolerance: Patient Tolerated treatment well;Patient limited by endurance  Activity Tolerance: limited due to morbid obesity     Patient Diagnosis(es): There were no encounter diagnoses. has a past medical history of Hypertension, Orthopnea, Seizures ([address] and Thyroid disease. has a past surgical history that includes Cholecystectomy. Restrictions  Restrictions/Precautions  Restrictions/Precautions: Fall Risk, General Precautions  Required Braces or Orthoses?: No     Subjective   General  Chart Reviewed: Yes  Family / Caregiver Present: No  Referring Practitioner: Shelton Carlson PA-C  Subjective  Subjective: Pt presents supine in bed, she is agreeable to work with PT / OT. She is agreeable to try and stand today.     Pain Screening  Patient Currently in Pain: Yes  Vital Signs  Patient Currently in Pain: Yes       Orientation  Orientation  Overall Orientation Status: Within Normal Limits    Objective   Bed mobility  Rolling to Left: Moderate assistance  Rolling to Right: Moderate assistance  Supine to Sit: Moderate assistance  Sit to Supine: Maximum assistance  Scooting: Maximal assistance     Transfers  Sit to Stand: 2 Person Assistance; Moderate Assistance  Ambulation  Ambulation?: No     Balance  Sitting - Static: Good;-  Sitting - Dynamic: Good;-           Goals  Short term goals  Time Frame for Short term goals: 3 days  Short term goal 1: Pt to be able to sit EOB x SBA  Short term goal 2: Pt tolerate supine or sitting therex x10-20 reps to improve LE ROM and strength  Short term goal 3: Standing and transfer ability to be assessed. Short term goal 4: Pt to move supine to sit x Min A    Plan    Plan  Times per week: 4-5 days per week  Plan weeks: 1  Current Treatment Recommendations: Strengthening, Gait Training, Patient/Caregiver Education & Training, ROM, Equipment Evaluation, Education, & procurement, Pain Management, Positioning, Home Exercise Program, Endurance Training, Functional Mobility Training, Safety Education & Training, Transfer Training  Safety Devices  Type of devices: Left in bed, Call light within reach     Therapy Time   Individual Concurrent Group Co-treatment   Time In 1321         Time Out 1356         Minutes 4606 [facility],        This note serves as D/C summary if patient is discharged prior to next visit.

## 2020-02-25 PROCEDURE — 97530 THERAPEUTIC ACTIVITIES: CPT

## 2020-02-25 PROCEDURE — 6370000000 HC RX 637 (ALT 250 FOR IP): Performed by: PHYSICIAN ASSISTANT

## 2020-02-25 PROCEDURE — 2580000003 HC RX 258: Performed by: PHYSICIAN ASSISTANT

## 2020-02-25 PROCEDURE — 6360000002 HC RX W HCPCS: Performed by: PHYSICIAN ASSISTANT

## 2020-02-25 PROCEDURE — 1200000002 HC SEMI PRIVATE SWING BED

## 2020-02-25 PROCEDURE — 97110 THERAPEUTIC EXERCISES: CPT

## 2020-02-25 RX ADMIN — GABAPENTIN 300 MG: 300 CAPSULE ORAL at 08:28

## 2020-02-25 RX ADMIN — ENOXAPARIN SODIUM 40 MG: 40 INJECTION SUBCUTANEOUS at 08:28

## 2020-02-25 RX ADMIN — PHENYTOIN SODIUM 500 MG: 100 CAPSULE ORAL at 20:42

## 2020-02-25 RX ADMIN — NYSTATIN 500000 UNITS: 500000 SUSPENSION ORAL at 08:27

## 2020-02-25 RX ADMIN — DICLOFENAC 2 G: 10 GEL TOPICAL at 20:43

## 2020-02-25 RX ADMIN — GABAPENTIN 300 MG: 300 CAPSULE ORAL at 20:42

## 2020-02-25 RX ADMIN — TRAMADOL HYDROCHLORIDE 50 MG: 50 TABLET, FILM COATED ORAL at 06:25

## 2020-02-25 RX ADMIN — CARVEDILOL 6.25 MG: 6.25 TABLET, FILM COATED ORAL at 17:14

## 2020-02-25 RX ADMIN — GUAIFENESIN AND DEXTROMETHORPHAN 5 ML: 100; 10 SYRUP ORAL at 06:25

## 2020-02-25 RX ADMIN — CETIRIZINE HYDROCHLORIDE 5 MG: 10 TABLET, FILM COATED ORAL at 08:28

## 2020-02-25 RX ADMIN — TRAMADOL HYDROCHLORIDE 50 MG: 50 TABLET, FILM COATED ORAL at 20:42

## 2020-02-25 RX ADMIN — MICONAZOLE NITRATE: 20 POWDER TOPICAL at 20:43

## 2020-02-25 RX ADMIN — LOSARTAN POTASSIUM 100 MG: 50 TABLET, FILM COATED ORAL at 08:28

## 2020-02-25 RX ADMIN — LEVOTHYROXINE SODIUM 112 MCG: 112 TABLET ORAL at 06:25

## 2020-02-25 RX ADMIN — FAMOTIDINE 40 MG: 20 TABLET, FILM COATED ORAL at 08:27

## 2020-02-25 RX ADMIN — POTASSIUM BICARBONATE 20 MEQ: 782 TABLET, EFFERVESCENT ORAL at 08:27

## 2020-02-25 RX ADMIN — NYSTATIN 500000 UNITS: 500000 SUSPENSION ORAL at 20:42

## 2020-02-25 RX ADMIN — DICLOFENAC 2 G: 10 GEL TOPICAL at 08:29

## 2020-02-25 RX ADMIN — MICONAZOLE NITRATE: 20 POWDER TOPICAL at 08:47

## 2020-02-25 RX ADMIN — FUROSEMIDE 40 MG: 40 TABLET ORAL at 08:27

## 2020-02-25 RX ADMIN — CARVEDILOL 6.25 MG: 6.25 TABLET, FILM COATED ORAL at 08:27

## 2020-02-25 RX ADMIN — NYSTATIN 500000 UNITS: 500000 SUSPENSION ORAL at 17:14

## 2020-02-25 ASSESSMENT — PAIN SCALES - GENERAL
PAINLEVEL_OUTOF10: 10
PAINLEVEL_OUTOF10: 10

## 2020-02-25 ASSESSMENT — PAIN DESCRIPTION - LOCATION: LOCATION: FOOT

## 2020-02-25 ASSESSMENT — PAIN DESCRIPTION - ORIENTATION: ORIENTATION: RIGHT;LEFT

## 2020-02-25 NOTE — FLOWSHEET NOTE
Pt resting quietly in bed. Am assessment complete, respirations equal and unlabored. Pt took am medications without difficulty. Pt instructed to call out with any needs or concerns, none at this time. Reviewed plan of care with pt, verbalized understanding. Call bell within pt reach. 02/25/20 0845   Assessment   Charting Type Shift assessment   Neurological   Neuro (WDL) WDL   Level of Consciousness 0   [place] Coma Scale   Eye Opening 4   Best Verbal Response 5   Best Motor Response 6   [place] Coma Scale Score 15   HEENT   HEENT (WDL) X   Right Eye Impaired vision   Left Eye Impaired vision   Mucous Membrane Pink;Moist   Teeth Missing teeth   Respiratory   Respiratory (WDL) WDL   Respiratory Pattern Regular   Respiratory Depth Normal   Respiratory Quality/Effort Unlabored   Chest Assessment Chest expansion symmetrical;Trachea midline   L Breath Sounds Diminished   R Breath Sounds Diminished   Cough/Sputum   Cough Congested   Sputum Amount None   Cardiac   Cardiac (WDL) WDL   Cardiac Monitor   Telemetry Monitor On No   Gastrointestinal   Abdominal (WDL) X   RUQ Bowel Sounds Active   LUQ Bowel Sounds Active   RLQ Bowel Sounds Active   LLQ Bowel Sounds Active   Abdomen Inspection Rotund;Rounded   Tenderness Soft;Nontender   Peripheral Vascular   Peripheral Vascular (WDL) X   Edema Right lower extremity; Left lower extremity;Generalized   Edema Generalized Non-pitting   RLE Edema +3;Pitting   LLE Edema +3;Pitting   RUE Neurovascular Assessment   Capillary Refill Less than/equal to 3 seconds   Color Pink   Temperature Warm   LUE Neurovascular Assessment   Capillary Refill Less than/equal to 3 seconds   Color Pink   Temperature Warm   RLE Neurovascular Assessment   Capillary Refill Less than/equal to 3 seconds   Color Pink   Temperature Warm   LLE Neurovascular Assessment   Capillary Refill Less than/equal to 3 seconds   Color Pink   Temperature Warm   Skin Color/Condition   Skin Color/Condition (WDL) X   Skin Color Pale   Skin Condition/Temp Warm;Dry;Flaky   Skin Integrity   Skin Integrity (WDL) X   Skin Integrity   (dry)   Location BLE   Skin Integrity Site 2   Skin Integrity Location 2 Redness; Tear   Location 2 buttock   Musculoskeletal   Musculoskeletal (WDL) X   RUE Limited movement   LUE Limited movement   RL Extremity Limited movement;Weakness; Swelling   LL Extremity Limited movement;Weakness; Swelling   Genitourinary   Genitourinary (WDL) X  (casas cath)   Flank Tenderness No   Suprapubic Tenderness No   Dysuria No   Wound 02/19/20 Heel Left skin intact black in color   Date First Assessed/Time First Assessed: 02/19/20 1530   Primary Wound Type: Pressure Injury  Location: Heel  Wound Location Orientation: Left  Wound Description (Comments): skin intact black in color   Dressing Status Clean;Dry; Intact   Wound 02/20/20 Buttocks Right Stage II Red in color   Date First Assessed/Time First Assessed: 02/20/20 0824   Primary Wound Type: Pressure Injury  Location: Buttocks  Wound Location Orientation: Right  Wound Description (Comments): Stage II Red in color   Wound Pressure Stage  2   Dressing/Treatment Other (comment)  (harshil)   Urethral Catheter   Placement Date/Time: 02/19/20 1840   Catheter Balloon Size: 10 mL  Collection Container: Standard  Securement Method: Securing device (Describe)  Urine Returned: (c) Yes   Catheter Indications Other (specify)   Site Assessment Pink   Urine Color Yellow   Urine Appearance Clear   Psychosocial   Psychosocial (WDL) WDL

## 2020-02-25 NOTE — PROGRESS NOTES
Recommendations: Strengthening, Endurance Training, Functional Mobility Training, Positioning, Safety Education & Training, Self-Care / ADL, Patient/Caregiver Education & Training    Goals  Short term goals  Time Frame for Short term goals: 1 week  Short term goal 1: Pt to come to sit at EOB with MOD I. Short term goal 2: Pt to complete toilet transfer with min assist using RW. Short term goal 3: Pt to complete toileting with MOD A. Short term goal 4: Pt to complete dressing with min assist.   Short term goal 5: pt to tolerate x15 minutes of activity to increase functional activity tolerance. Long term goals  Time Frame for Long term goals : 2 week  Long term goal 1: Pt to complete toileting with SBA. Long term goal 2: Pt to complete dressing with CGA. Long term goal 3: Pt to complete toileting with MIN A. Therapy Time   Individual Concurrent Group Co-treatment   Time In 1763         Time Out 9061         Minutes 49              This note serves as a DC summary in the event of pt discharge.      Palmira Restrepo OTR/L

## 2020-02-25 NOTE — PROGRESS NOTES
Physical Therapy  Facility/Department: [facility] MED SURG  Daily Treatment Note  NAME: Darling Russell  : 1950  MRN: [id number]    Date of Service: 2020    Discharge Recommendations:  Continue to assess pending progress        Assessment   Assessment: Pt was able to move supine to sit with greater ease today requiring less assistance. Pt was able to stand x 3 trials today for short periods. Pt participated well today and was able to advance with tolerance to functional mobility. Treatment Diagnosis: functional decline  Prognosis: Fair  Decision Making: Medium Complexity  REQUIRES PT FOLLOW UP: Yes  Activity Tolerance  Activity Tolerance: Patient Tolerated treatment well;Patient limited by endurance     Patient Diagnosis(es): There were no encounter diagnoses. has a past medical history of Hypertension, Orthopnea, Seizures ([address] and Thyroid disease. has a past surgical history that includes Cholecystectomy. Restrictions  Restrictions/Precautions  Restrictions/Precautions: Fall Risk, General Precautions  Required Braces or Orthoses?: No  Subjective   General  Chart Reviewed: Yes  Family / Caregiver Present: No  Referring Practitioner: Jill Douglas PA-C  Subjective  Subjective: Pt presents supine in bed, she is agreeable to work with PT / OT. She is agreeable to try and stand today. Orientation  Orientation  Overall Orientation Status: Within Normal Limits  Cognition      Objective   Bed mobility  Rolling to Left: Moderate assistance  Rolling to Right: Moderate assistance  Supine to Sit: Moderate assistance;2 Person assistance  Sit to Supine: Moderate assistance;2 Person assistance  Scooting: Moderate assistance;2 Person assistance  Transfers  Sit to Stand: 2 Person Assistance; Moderate Assistance  Stand to sit: Moderate Assistance           Exercises  Straight Leg Raise: AAROM - 1 x 10 B  Quad Sets: 20x B  Hip Abduction: 20x B  Ankle Pumps: 20x B                        Goals  Short term goals  Time Frame for Short term goals: 3 days  Short term goal 1: Pt to be able to sit EOB x SBA  Short term goal 2: Pt tolerate supine or sitting therex x10-20 reps to improve LE ROM and strength  Short term goal 3: Standing and transfer ability to be assessed.    Short term goal 4: Pt to move supine to sit x Min A    Plan    Plan  Times per week: 4-5 days per week  Plan weeks: 1  Current Treatment Recommendations: Strengthening, Gait Training, Patient/Caregiver Education & Training, ROM, Equipment Evaluation, Education, & procurement, Pain Management, Positioning, Home Exercise Program, Endurance Training, Functional Mobility Training, Safety Education & Training, Transfer Training  Safety Devices  Type of devices: Left in bed, Call light within reach     Therapy Time   Individual Concurrent Group Co-treatment   Time In 5758         Time Out [address] [place] [address]

## 2020-02-26 PROCEDURE — 97803 MED NUTRITION INDIV SUBSEQ: CPT

## 2020-02-26 PROCEDURE — 6370000000 HC RX 637 (ALT 250 FOR IP): Performed by: PHYSICIAN ASSISTANT

## 2020-02-26 PROCEDURE — 6360000002 HC RX W HCPCS: Performed by: PHYSICIAN ASSISTANT

## 2020-02-26 PROCEDURE — 2500000003 HC RX 250 WO HCPCS: Performed by: PHYSICIAN ASSISTANT

## 2020-02-26 PROCEDURE — 1200000002 HC SEMI PRIVATE SWING BED

## 2020-02-26 RX ADMIN — ENOXAPARIN SODIUM 40 MG: 40 INJECTION SUBCUTANEOUS at 08:31

## 2020-02-26 RX ADMIN — FUROSEMIDE 40 MG: 40 TABLET ORAL at 08:29

## 2020-02-26 RX ADMIN — NYSTATIN 500000 UNITS: 500000 SUSPENSION ORAL at 16:06

## 2020-02-26 RX ADMIN — NYSTATIN 500000 UNITS: 500000 SUSPENSION ORAL at 21:13

## 2020-02-26 RX ADMIN — CARVEDILOL 6.25 MG: 6.25 TABLET, FILM COATED ORAL at 08:31

## 2020-02-26 RX ADMIN — NYSTATIN 500000 UNITS: 500000 SUSPENSION ORAL at 13:27

## 2020-02-26 RX ADMIN — MICONAZOLE NITRATE: 20 POWDER TOPICAL at 09:15

## 2020-02-26 RX ADMIN — GABAPENTIN 300 MG: 300 CAPSULE ORAL at 08:29

## 2020-02-26 RX ADMIN — NYSTATIN 500000 UNITS: 500000 SUSPENSION ORAL at 08:31

## 2020-02-26 RX ADMIN — FAMOTIDINE 40 MG: 20 TABLET, FILM COATED ORAL at 08:29

## 2020-02-26 RX ADMIN — PHENYTOIN SODIUM 500 MG: 100 CAPSULE ORAL at 21:13

## 2020-02-26 RX ADMIN — DICLOFENAC 2 G: 10 GEL TOPICAL at 21:14

## 2020-02-26 RX ADMIN — DICLOFENAC 2 G: 10 GEL TOPICAL at 08:31

## 2020-02-26 RX ADMIN — LOSARTAN POTASSIUM 100 MG: 50 TABLET, FILM COATED ORAL at 08:28

## 2020-02-26 RX ADMIN — CARVEDILOL 6.25 MG: 6.25 TABLET, FILM COATED ORAL at 16:06

## 2020-02-26 RX ADMIN — CETIRIZINE HYDROCHLORIDE 5 MG: 10 TABLET, FILM COATED ORAL at 08:31

## 2020-02-26 RX ADMIN — GABAPENTIN 300 MG: 300 CAPSULE ORAL at 21:13

## 2020-02-26 RX ADMIN — ACETAMINOPHEN 650 MG: 325 TABLET, FILM COATED ORAL at 01:02

## 2020-02-26 RX ADMIN — LEVOTHYROXINE SODIUM 112 MCG: 112 TABLET ORAL at 05:34

## 2020-02-26 RX ADMIN — POTASSIUM BICARBONATE 20 MEQ: 782 TABLET, EFFERVESCENT ORAL at 08:30

## 2020-02-26 RX ADMIN — TRAMADOL HYDROCHLORIDE 50 MG: 50 TABLET, FILM COATED ORAL at 05:34

## 2020-02-26 RX ADMIN — MICONAZOLE NITRATE: 20 POWDER TOPICAL at 21:14

## 2020-02-26 RX ADMIN — TRAMADOL HYDROCHLORIDE 50 MG: 50 TABLET, FILM COATED ORAL at 21:13

## 2020-02-26 ASSESSMENT — PAIN SCALES - GENERAL
PAINLEVEL_OUTOF10: 9
PAINLEVEL_OUTOF10: 10
PAINLEVEL_OUTOF10: 9

## 2020-02-26 NOTE — FLOWSHEET NOTE
Pt resting quietly in bed. Am assessment complete, respirations equal and unlabored. Pt took am medications without difficulty. Pt instructed to call out with any needs or concerns, pt complains of left shoulder pain for a few weeks. Pt reports she injured it at home and states she wants an x-ray, pt also states she will not work with therapy until her shoulder is x-rayed. Reviewed plan of care with pt, verbalized understanding. Call bell within pt reach. Spoke with Mariluz Barnes about pts wishes for shoulder x-ray. 02/26/20 0845   Assessment   Charting Type Shift assessment   Neurological   Neuro (WDL) WDL   Level of Consciousness 0   [place] Coma Scale   Eye Opening 4   Best Verbal Response 5   Best Motor Response 6   [place] Coma Scale Score 15   HEENT   HEENT (WDL) X   Right Eye Impaired vision   Left Eye Impaired vision   Voice Normal   Mucous Membrane Pink;Moist   Teeth Missing teeth   Respiratory   Respiratory (WDL) WDL   Respiratory Pattern Regular   Respiratory Depth Normal   Respiratory Quality/Effort Unlabored   Chest Assessment Chest expansion symmetrical;Trachea midline   L Breath Sounds Clear;Diminished   R Breath Sounds Clear;Diminished   Breath Sounds   Right Upper Lobe Clear   Right Middle Lobe Diminished   Right Lower Lobe Diminished   Left Upper Lobe Clear   Left Lower Lobe Diminished   Cough/Sputum   Cough Congested   Sputum Amount None   Incentive Spirometry Tx   Respiratory Effort Dyspnea with Exertion   Cardiac   Cardiac (WDL) WDL   Cardiac Monitor   Telemetry Monitor On No   Gastrointestinal   Abdominal (WDL) X   RUQ Bowel Sounds Active   LUQ Bowel Sounds Active   RLQ Bowel Sounds Active   LLQ Bowel Sounds Active   Abdomen Inspection Rotund;Rounded   Tenderness Soft;Nontender   Peripheral Vascular   Peripheral Vascular (WDL) X   Edema Right lower extremity; Left lower extremity;Generalized   Edema Generalized Non-pitting   RLE Edema +3;Pitting   LLE Edema +3;Pitting   RUE Neurovascular Assessment   Capillary Refill Less than/equal to 3 seconds   Color Pink   Temperature Warm   LUE Neurovascular Assessment   Capillary Refill Less than/equal to 3 seconds   Color Pink   Temperature Warm   RLE Neurovascular Assessment   Capillary Refill Less than/equal to 3 seconds   Color Pink   Temperature Warm   LLE Neurovascular Assessment   Capillary Refill Less than/equal to 3 seconds   Color Pink   Temperature Warm   Skin Color/Condition   Skin Color/Condition (WDL) X   Skin Color Pale;Pink   Skin Condition/Temp Warm;Dry;Flaky   Skin Integrity   Skin Integrity (WDL) X   Skin Integrity Other (Comment)  (dry skin)   Location BLE   Dressing Site Abdominal pannus   Treatment Pharmaceutical   Assessed this shift Red;Moist   Multiple Skin Integrity Sites Yes   Skin Integrity Site 2   Skin Integrity Location 2 Redness; Tear   Location 2 buttock   Skin Integrity Site 3   Skin Integrity Location 3 Redness;Rash    Location 3 bilateral breasts   Musculoskeletal   Musculoskeletal (WDL) X   RUE Limited movement   LUE Limited movement   RL Extremity Limited movement;Weakness; Swelling   LL Extremity Limited movement;Weakness; Swelling   Genitourinary   Genitourinary (WDL) X  (casas cath)   Flank Tenderness No   Suprapubic Tenderness No   Dysuria No   Urine Assessment   Urine Color Yellow/straw   Urine Appearance Clear   Wound 02/20/20 Buttocks Right Stage II Red in color   Date First Assessed/Time First Assessed: 02/20/20 0824   Primary Wound Type: Pressure Injury  Location: Buttocks  Wound Location Orientation: Right  Wound Description (Comments): Stage II Red in color   Dressing/Treatment Other (comment)  (harshil)   Urethral Catheter   Placement Date/Time: 02/19/20 1840   Catheter Balloon Size: 10 mL  Collection Container: Standard  Securement Method: Securing device (Describe)  Urine Returned: (c) Yes   Catheter Indications Other (specify)   Site Assessment Pink   Urine Color Yellow   Urine Appearance Clear   Psychosocial Psychosocial (WDL) WDL

## 2020-02-26 NOTE — PLAN OF CARE
Problem: Nutrition  Goal: Understanding of nutritional guidelines  Outcome: Ongoing     Problem: Daily Care:  Goal: Daily care needs are met  Description  Daily care needs are met  Outcome: Ongoing

## 2020-02-27 PROBLEM — S31.819A WOUND OF RIGHT BUTTOCK: Status: ACTIVE | Noted: 2020-02-13

## 2020-02-27 PROCEDURE — 97110 THERAPEUTIC EXERCISES: CPT

## 2020-02-27 PROCEDURE — 97530 THERAPEUTIC ACTIVITIES: CPT

## 2020-02-27 PROCEDURE — 99308 SBSQ NF CARE LOW MDM 20: CPT | Performed by: INTERNAL MEDICINE

## 2020-02-27 PROCEDURE — 2580000003 HC RX 258: Performed by: PHYSICIAN ASSISTANT

## 2020-02-27 PROCEDURE — 6370000000 HC RX 637 (ALT 250 FOR IP): Performed by: PHYSICIAN ASSISTANT

## 2020-02-27 PROCEDURE — 6360000002 HC RX W HCPCS: Performed by: PHYSICIAN ASSISTANT

## 2020-02-27 PROCEDURE — 1200000002 HC SEMI PRIVATE SWING BED

## 2020-02-27 RX ORDER — EMOLLIENT COMBINATION NO.40
LOTION (GRAM) TOPICAL PRN
Status: DISCONTINUED | OUTPATIENT
Start: 2020-02-27 | End: 2020-03-13 | Stop reason: HOSPADM

## 2020-02-27 RX ADMIN — FAMOTIDINE 40 MG: 20 TABLET, FILM COATED ORAL at 08:24

## 2020-02-27 RX ADMIN — CETIRIZINE HYDROCHLORIDE 5 MG: 10 TABLET, FILM COATED ORAL at 08:23

## 2020-02-27 RX ADMIN — TRAMADOL HYDROCHLORIDE 50 MG: 50 TABLET, FILM COATED ORAL at 18:46

## 2020-02-27 RX ADMIN — MICONAZOLE NITRATE: 20 POWDER TOPICAL at 22:10

## 2020-02-27 RX ADMIN — LOSARTAN POTASSIUM 100 MG: 50 TABLET, FILM COATED ORAL at 08:25

## 2020-02-27 RX ADMIN — POTASSIUM BICARBONATE 20 MEQ: 782 TABLET, EFFERVESCENT ORAL at 08:23

## 2020-02-27 RX ADMIN — NYSTATIN 500000 UNITS: 500000 SUSPENSION ORAL at 14:18

## 2020-02-27 RX ADMIN — ENOXAPARIN SODIUM 40 MG: 40 INJECTION SUBCUTANEOUS at 08:21

## 2020-02-27 RX ADMIN — TRAMADOL HYDROCHLORIDE 50 MG: 50 TABLET, FILM COATED ORAL at 05:44

## 2020-02-27 RX ADMIN — DICLOFENAC 2 G: 10 GEL TOPICAL at 08:22

## 2020-02-27 RX ADMIN — NYSTATIN 500000 UNITS: 500000 SUSPENSION ORAL at 16:45

## 2020-02-27 RX ADMIN — GABAPENTIN 300 MG: 300 CAPSULE ORAL at 08:25

## 2020-02-27 RX ADMIN — GABAPENTIN 300 MG: 300 CAPSULE ORAL at 21:37

## 2020-02-27 RX ADMIN — NYSTATIN 500000 UNITS: 500000 SUSPENSION ORAL at 21:37

## 2020-02-27 RX ADMIN — FUROSEMIDE 40 MG: 40 TABLET ORAL at 08:24

## 2020-02-27 RX ADMIN — DOCUSATE SODIUM 100 MG: 100 CAPSULE, LIQUID FILLED ORAL at 08:23

## 2020-02-27 RX ADMIN — PHENYTOIN SODIUM 500 MG: 100 CAPSULE ORAL at 21:37

## 2020-02-27 RX ADMIN — MICONAZOLE NITRATE: 20 POWDER TOPICAL at 08:26

## 2020-02-27 RX ADMIN — CARVEDILOL 6.25 MG: 6.25 TABLET, FILM COATED ORAL at 16:45

## 2020-02-27 RX ADMIN — POLYETHYLENE GLYCOL 3350 17 G: 17 POWDER, FOR SOLUTION ORAL at 08:21

## 2020-02-27 RX ADMIN — NYSTATIN 500000 UNITS: 500000 SUSPENSION ORAL at 08:25

## 2020-02-27 RX ADMIN — DICLOFENAC 2 G: 10 GEL TOPICAL at 21:38

## 2020-02-27 RX ADMIN — CARVEDILOL 6.25 MG: 6.25 TABLET, FILM COATED ORAL at 08:24

## 2020-02-27 RX ADMIN — LEVOTHYROXINE SODIUM 112 MCG: 112 TABLET ORAL at 05:44

## 2020-02-27 RX ADMIN — Medication 10 ML: at 08:21

## 2020-02-27 ASSESSMENT — PAIN SCALES - GENERAL
PAINLEVEL_OUTOF10: 10
PAINLEVEL_OUTOF10: 9
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
## 2020-02-27 NOTE — PLAN OF CARE
Problem: Skin Integrity/Risk  Goal: No skin breakdown during hospitalization  Outcome: Met This Shift     Problem: Infection:  Goal: Will remain free from infection  Description  Will remain free from infection  Outcome: Met This Shift

## 2020-02-27 NOTE — FLOWSHEET NOTE
Skin Integrity Other (Comment)  (dry flaky skin)   Location ble   Assessed this shift? Yes   Skin Fold Management Yes   Dressing Site Abdominal pannus   Treatment Pharmaceutical   Date applied? 02/27/20   Assessed this shift Red;Moist   Skin Integrity Site 2   Skin Integrity Location 2 Redness; Tear   Location 2 buttocks  (scratches)   Preventative Dressing No   Assessed this shift? Yes   Dressing Site Sacrum   Skin Integrity Site 3   Skin Integrity Location 3 Redness;Rash    Location 3 bilateral breast   Preventative Dressing No   Assessed this shift? Yes   Musculoskeletal   Musculoskeletal (WDL) X   RUE Limited movement   LUE Limited movement   RL Extremity Limited movement;Weakness; Swelling   LL Extremity Limited movement;Weakness; Swelling   Genitourinary   Genitourinary (WDL) X  (casas cath)   Flank Tenderness No   Suprapubic Tenderness No   Dysuria No   Urine Assessment   Urine Color Yellow/straw   Urine Appearance Clear   Wound 02/19/20 Heel Left skin intact black in color   Date First Assessed/Time First Assessed: 02/19/20 1530   Primary Wound Type: Pressure Injury  Location: Heel  Wound Location Orientation: Left  Wound Description (Comments): skin intact black in color   Dressing Status Clean;Dry; Intact   Wound 02/20/20 Buttocks Right Stage II Red in color   Date First Assessed/Time First Assessed: 02/20/20 0824   Primary Wound Type: Pressure Injury  Location: Buttocks  Wound Location Orientation: Right  Wound Description (Comments): Stage II Red in color   Dressing Status Clean;Dry; Intact   Dressing/Treatment Other (comment)  (harshil)   Urethral Catheter   Placement Date/Time: 02/19/20 1840   Catheter Balloon Size: 10 mL  Collection Container: Standard  Securement Method: Securing device (Describe)  Urine Returned: (c) Yes   Catheter Indications Other (specify)   Site Assessment Pink   Urine Color Yellow   Urine Appearance Clear   Psychosocial   Psychosocial (WDL) WDL   Heel protectors on ble.

## 2020-02-27 NOTE — CARE COORDINATION
Interdisciplinary rounding completed. Efren Lawson (provider rep), Mannie Infante (), Jacki Rutherford (nursing), Rufus (PT), Palmira (OT), Aliya Samano (pharmacy), and Nicole (dietitian) all involved. Activities reviewed with OT.      lived with family. They are refusing to let her come back. Multiple NH denials. APS consulted. pt stood up twice yesterday with max assist X2.  working on bed exercises to increase independence. wt: 373lb; gradual planned loss. Low sodium diet, Etienne bid will add Proteinex 1 x daily, patient eating 65-89% of past 7 meals. Has non-pitting generalized edema and +3 pitting edema in lower extremities. No current antibioitics. Lovenox 40mg subq daily for VTE prophylaxis.

## 2020-02-27 NOTE — PROGRESS NOTES
Physical Therapy  Facility/Department: [facility] MED SURG  Daily Treatment Note  NAME: Rowdy Das  : 1950  MRN: [id number]    Date of Service: 2020    Discharge Recommendations:  Continue to assess pending progress        Assessment   Assessment: Pt required frequent encouragement during session today but was able to stand x 2 trials; 78 seconds and 30 seconds. Pt is improving with bed mobility requiring less assistance to move from supine to sit. Plan to progress mobility as tolerated. Treatment Diagnosis: functional decline  Prognosis: Fair  Decision Making: Medium Complexity  REQUIRES PT FOLLOW UP: Yes  Activity Tolerance  Activity Tolerance: Patient Tolerated treatment well;Patient limited by endurance  Activity Tolerance: limited due to morbid obesity     Patient Diagnosis(es): There were no encounter diagnoses. has a past medical history of Hypertension, Orthopnea, Seizures ([address] and Thyroid disease. has a past surgical history that includes Cholecystectomy. Restrictions  Restrictions/Precautions  Restrictions/Precautions: Fall Risk, General Precautions  Required Braces or Orthoses?: No     Subjective   General  Chart Reviewed: Yes  Family / Caregiver Present: No  Referring Practitioner: Tatiana Solo PA-C  Subjective  Subjective: Pt presents supine in bed, she is agreeable to work with PT / OT. Pain Screening  Patient Currently in Pain: Yes  Vital Signs  Patient Currently in Pain: Yes       Orientation  Orientation  Overall Orientation Status: Within Normal Limits    Objective   Bed mobility  Rolling to Left: Moderate assistance  Rolling to Right: Moderate assistance  Supine to Sit: Moderate assistance;2 Person assistance  Sit to Supine: Moderate assistance;2 Person assistance  Scooting: Contact guard assistance  Transfers  Sit to Stand: 2 Person Assistance; Moderate Assistance  Stand to sit: Minimal Assistance  Ambulation  Ambulation?: No     Balance  Sitting - Static: Good;-  Sitting - Dynamic: Good;-         Goals  Short term goals  Time Frame for Short term goals: 1 week  Short term goal 1: Pt to be able to sit EOB x SBA  Short term goal 2: Pt tolerate supine or sitting therex x10-20 reps to improve LE ROM and strength  Short term goal 3: Standing and transfer ability to be assessed. Short term goal 4: Pt to move supine to sit x Min A    Plan    Plan  Times per week: 4-5 days per week  Plan weeks: 1  Current Treatment Recommendations: Strengthening, Gait Training, Patient/Caregiver Education & Training, ROM, Equipment Evaluation, Education, & procurement, Pain Management, Positioning, Home Exercise Program, Endurance Training, Functional Mobility Training, Safety Education & Training, Transfer Training  Safety Devices  Type of devices: Left in bed, Call light within reach     Therapy Time   Individual Concurrent Group Co-treatment   Time In 1006         Time Out 1044         Minutes Addis Brantley 1284, PT       This note serves as D/C summary if patient is discharged prior to next visit.

## 2020-02-27 NOTE — PROGRESS NOTES
Nadir Mendes was evaluated today and a DME order was entered for variable height hospital bed because she requires assistance for positioning needs not possible in an ordinary bed, requirement of elevation of head of bed more than 30 degrees for the diagnosis of orthopnea. Patient needs variability of bed height to perform bathing, toileting, personal cares, grooming and hygiene. Current body Weight: (!) 370 lb 3.2 oz (167.9 kg). The need for this equipment was discussed with the patient and she understands and is in agreement.

## 2020-02-27 NOTE — PROGRESS NOTES
ROM/Therapeutic Exercise  Type of ROM/Therapeutic Exercise: AROM  Exercises  Scapular Protraction: x10  Scapular Retraction: x10  Shoulder Elevation: x10  Shoulder Flexion: x10  Shoulder ABduction: x10  Horizontal ABduction: x10  Horizontal ADduction: x10  Elbow Flexion: x10  Elbow Extension: x10     Plan   Plan  Times per week: 3-5  Times per day: Daily  Plan weeks: 1  Current Treatment Recommendations: Strengthening, Endurance Training, Functional Mobility Training, Positioning, Safety Education & Training, Self-Care / ADL, Patient/Caregiver Education & Training    Goals  Short term goals  Time Frame for Short term goals: 1 week  Short term goal 1: Pt to come to sit at EOB with MOD I. Short term goal 2: Pt to complete toilet transfer with min assist using RW. Short term goal 3: Pt to complete toileting with MOD A. Short term goal 4: Pt to complete dressing with min assist.   Short term goal 5: pt to tolerate x15 minutes of activity to increase functional activity tolerance. Long term goals  Time Frame for Long term goals : 2 week  Long term goal 1: Pt to complete toileting with SBA. Long term goal 2: Pt to complete dressing with CGA. Long term goal 3: Pt to complete toileting with MIN A. Therapy Time   Individual Concurrent Group Co-treatment   Time In 8830         Time Out 7703         Minutes 54              This note serves as a DC summary in the event of pt discharge.      CHRIS Way/JOAQUÍN

## 2020-02-28 PROCEDURE — 1200000002 HC SEMI PRIVATE SWING BED

## 2020-02-28 PROCEDURE — 6370000000 HC RX 637 (ALT 250 FOR IP): Performed by: PHYSICIAN ASSISTANT

## 2020-02-28 PROCEDURE — 97530 THERAPEUTIC ACTIVITIES: CPT

## 2020-02-28 PROCEDURE — 97110 THERAPEUTIC EXERCISES: CPT

## 2020-02-28 PROCEDURE — 6360000002 HC RX W HCPCS: Performed by: PHYSICIAN ASSISTANT

## 2020-02-28 RX ADMIN — TRAMADOL HYDROCHLORIDE 50 MG: 50 TABLET, FILM COATED ORAL at 14:45

## 2020-02-28 RX ADMIN — GABAPENTIN 300 MG: 300 CAPSULE ORAL at 19:48

## 2020-02-28 RX ADMIN — CARVEDILOL 6.25 MG: 6.25 TABLET, FILM COATED ORAL at 08:22

## 2020-02-28 RX ADMIN — FAMOTIDINE 40 MG: 20 TABLET, FILM COATED ORAL at 08:21

## 2020-02-28 RX ADMIN — LEVOTHYROXINE SODIUM 112 MCG: 112 TABLET ORAL at 06:05

## 2020-02-28 RX ADMIN — TRAMADOL HYDROCHLORIDE 50 MG: 50 TABLET, FILM COATED ORAL at 06:05

## 2020-02-28 RX ADMIN — Medication: at 19:47

## 2020-02-28 RX ADMIN — MICONAZOLE NITRATE: 20 POWDER TOPICAL at 08:23

## 2020-02-28 RX ADMIN — MICONAZOLE NITRATE: 20 POWDER TOPICAL at 19:47

## 2020-02-28 RX ADMIN — NYSTATIN 500000 UNITS: 500000 SUSPENSION ORAL at 12:37

## 2020-02-28 RX ADMIN — FUROSEMIDE 40 MG: 40 TABLET ORAL at 08:21

## 2020-02-28 RX ADMIN — NYSTATIN 500000 UNITS: 500000 SUSPENSION ORAL at 19:49

## 2020-02-28 RX ADMIN — DICLOFENAC 2 G: 10 GEL TOPICAL at 08:20

## 2020-02-28 RX ADMIN — DOCUSATE SODIUM 100 MG: 100 CAPSULE, LIQUID FILLED ORAL at 16:17

## 2020-02-28 RX ADMIN — NYSTATIN 500000 UNITS: 500000 SUSPENSION ORAL at 17:45

## 2020-02-28 RX ADMIN — CARVEDILOL 6.25 MG: 6.25 TABLET, FILM COATED ORAL at 17:45

## 2020-02-28 RX ADMIN — PHENYTOIN SODIUM 500 MG: 100 CAPSULE ORAL at 19:47

## 2020-02-28 RX ADMIN — CETIRIZINE HYDROCHLORIDE 5 MG: 10 TABLET, FILM COATED ORAL at 08:22

## 2020-02-28 RX ADMIN — ENOXAPARIN SODIUM 40 MG: 40 INJECTION SUBCUTANEOUS at 08:20

## 2020-02-28 RX ADMIN — LOSARTAN POTASSIUM 100 MG: 50 TABLET, FILM COATED ORAL at 08:21

## 2020-02-28 RX ADMIN — GABAPENTIN 300 MG: 300 CAPSULE ORAL at 08:22

## 2020-02-28 RX ADMIN — NYSTATIN 500000 UNITS: 500000 SUSPENSION ORAL at 08:20

## 2020-02-28 RX ADMIN — DICLOFENAC 2 G: 10 GEL TOPICAL at 19:49

## 2020-02-28 RX ADMIN — POTASSIUM BICARBONATE 20 MEQ: 782 TABLET, EFFERVESCENT ORAL at 08:20

## 2020-02-28 ASSESSMENT — PAIN SCALES - GENERAL
PAINLEVEL_OUTOF10: 10
PAINLEVEL_OUTOF10: 9

## 2020-02-28 NOTE — PROGRESS NOTES
Pt complained of LLE pain at 1545. Tramadol given and extremitiy elevated on pillow and heel protector boot loosened. Refused daily colace this am, but now requesting dose for complaints of hard stool. Asleep currently.

## 2020-02-28 NOTE — PROGRESS NOTES
Occupational Therapy  Facility/Department: [facility] MED SURG  Daily Treatment Note  NAME: Jim Gomez  : 1950  MRN: [id number]    Date of Service: 2020    Discharge Recommendations:  Continue to assess pending progress       Assessment   Assessment: CO tx with PT on this date. Pt requiring max encouragement to engage in therapy services. Pt educated on benefit from consistent participation in OT services to regain independence and decrease burden of care for safe transition back home. Pt reluctant to attempt mobility on this date including rolling in bed and sitting at EOB. Pt is requiring less assistance to come to sit and for scooting at EOB. Pt tolerated sitting at EOB unsupported. Pt attempted come to stand 1x on this date with mod x2 assist declined to attempt standing tolerance or multiple trials. Pt took gait belt off herself and requested to lay back down in bed. Pt required DEP for donning/doffing socks. Patient Diagnosis(es): There were no encounter diagnoses. has a past medical history of Hypertension, Orthopnea, Seizures ([address] and Thyroid disease. has a past surgical history that includes Cholecystectomy. Restrictions  Restrictions/Precautions  Restrictions/Precautions: Fall Risk, General Precautions  Required Braces or Orthoses?: No  Subjective   General  Chart Reviewed: Yes  Patient assessed for rehabilitation services?: Yes  Family / Caregiver Present: No  Referring Practitioner: GARO Delgadillo  Diagnosis: Generalized weakness, UTI,   Subjective  Subjective: Pt reports pain in BLE feet and back. Agreeable to attempt therapy services on this date. Orientation     Objective                Bed mobility  Rolling to Left: Moderate assistance  Rolling to Right: Moderate assistance  Supine to Sit: Moderate assistance;2 Person assistance  Sit to Supine: Moderate assistance;2 Person assistance  Scooting: Contact guard assistance  Transfers  Sit to stand:  Moderate

## 2020-02-28 NOTE — FLOWSHEET NOTE
02/28/20 0815   Assessment   Charting Type Shift assessment   Neurological   Neuro (WDL) WDL   Level of Consciousness 0   Brusett Coma Scale   Eye Opening 4   Best Verbal Response 5   Best Motor Response 6   Huy Coma Scale Score 15   HEENT   HEENT (WDL) X   Right Eye Impaired vision   Left Eye Impaired vision   Voice Normal   Mucous Membrane Pink;Moist   Teeth Missing teeth   Respiratory   Respiratory (WDL) WDL   Respiratory Pattern Regular   Respiratory Depth Normal   Respiratory Quality/Effort Unlabored   Chest Assessment Chest expansion symmetrical;Trachea midline   L Breath Sounds Clear;Diminished   R Breath Sounds Clear;Diminished   Breath Sounds   Right Upper Lobe Clear   Right Middle Lobe Diminished   Right Lower Lobe Diminished   Left Upper Lobe Clear   Left Lower Lobe Diminished   Cough/Sputum   Cough Congested   Sputum Amount None   Cardiac   Cardiac (WDL) WDL   Cardiac Monitor   Telemetry Monitor On No   Gastrointestinal   Abdominal (WDL) X   RUQ Bowel Sounds Active   LUQ Bowel Sounds Active   RLQ Bowel Sounds Active   LLQ Bowel Sounds Active   Abdomen Inspection Rotund;Rounded   Tenderness Soft;Nontender   Peripheral Vascular   Peripheral Vascular (WDL) X   Edema Right lower extremity; Left lower extremity;Generalized   Edema Generalized Non-pitting   RLE Edema +3;Pitting   LLE Edema +3;Pitting   RUE Neurovascular Assessment   Capillary Refill Less than/equal to 3 seconds   Color Pink   Temperature Warm   LUE Neurovascular Assessment   Capillary Refill Less than/equal to 3 seconds   Color Pink   Temperature Warm   RLE Neurovascular Assessment   Capillary Refill Less than/equal to 3 seconds   Color Pink   Temperature Warm   LLE Neurovascular Assessment   Capillary Refill Less than/equal to 3 seconds   Color Pink   Temperature Warm   Skin Color/Condition   Skin Color/Condition (WDL) X   Skin Color Appropriate for ethnicity   Skin Condition/Temp Flaky; Warm;Dry;Swollen   Skin Integrity   Skin Integrity (WDL) X   Skin Integrity Other (Comment)  (dry flaky)   Location ble   Preventative Dressing No   Assessed this shift? Yes   Skin Fold Management Yes   Dressing Site Abdominal pannus   Treatment Pharmaceutical   Date applied? 02/28/20   Assessed this shift Red;Moist   Multiple Skin Integrity Sites Yes   Skin Integrity Site 2   Skin Integrity Location 2 Redness   Location 2 buttocks   Preventative Dressing No   Assessed this shift? Yes   Dressing Site Sacrum   Skin Integrity Site 3   Skin Integrity Location 3 Redness;Rash    Location 3 beneath fariba breasts   Preventative Dressing No   Date Applied 02/28/20   Assessed this shift? Yes   Musculoskeletal   Musculoskeletal (WDL) X   RUE Limited movement   LUE Limited movement   RL Extremity Limited movement;Weakness; Swelling   LL Extremity Limited movement;Weakness; Swelling   Genitourinary   Genitourinary (WDL) X  (casas cath)   Flank Tenderness No   Suprapubic Tenderness No   Dysuria No   Urine Assessment   Urine Color Yellow/straw   Urine Appearance Clear   Wound 02/19/20 Heel Left skin intact black in color   Date First Assessed/Time First Assessed: 02/19/20 1530   Primary Wound Type: Pressure Injury  Location: Heel  Wound Location Orientation: Left  Wound Description (Comments): skin intact black in color   Dressing Status Clean;Dry; Intact   Dressing Changed Changed/New   Dressing/Treatment Foam  (applied)   Judith-wound Assessment Intact   Wound 02/20/20 Buttocks Right Stage II Red in color   Date First Assessed/Time First Assessed: 02/20/20 0824   Primary Wound Type: Pressure Injury  Location: Buttocks  Wound Location Orientation: Right  Wound Description (Comments): Stage II Red in color   Dressing Status Clean;Dry; Intact   Dressing/Treatment Other (comment)  (harshil)   Urethral Catheter   Placement Date/Time: 02/19/20 1840   Catheter Balloon Size: 10 mL  Collection Container: Standard  Securement Method: Securing device (Describe)  Urine Returned: (c) Yes   Catheter

## 2020-02-28 NOTE — PLAN OF CARE
Problem: Risk for Impaired Skin Integrity  Goal: Tissue integrity - skin and mucous membranes  Description  Structural intactness and normal physiological function of skin and  mucous membranes.   Outcome: Ongoing     Problem: Pain:  Goal: Patient's pain/discomfort is manageable  Description  Patient's pain/discomfort is manageable  Outcome: Ongoing     Problem: Daily Care:  Goal: Daily care needs are met  Description  Daily care needs are met  Outcome: Ongoing

## 2020-02-28 NOTE — PROGRESS NOTES
9/47/7764    I certify that the continued skilled inpatient care is necessary for the following reasons:  therapy    I estimate that the additional period of skilled inpatient care will be 2 weeks.     Electronically signed by Karissa Higginbotham RN on 2/28/2020 at 8:09 AM

## 2020-02-29 PROCEDURE — 1200000002 HC SEMI PRIVATE SWING BED

## 2020-02-29 PROCEDURE — 2580000003 HC RX 258: Performed by: PHYSICIAN ASSISTANT

## 2020-02-29 PROCEDURE — 6370000000 HC RX 637 (ALT 250 FOR IP): Performed by: PHYSICIAN ASSISTANT

## 2020-02-29 PROCEDURE — 6360000002 HC RX W HCPCS: Performed by: PHYSICIAN ASSISTANT

## 2020-02-29 RX ADMIN — MICONAZOLE NITRATE: 20 POWDER TOPICAL at 20:37

## 2020-02-29 RX ADMIN — DOCUSATE SODIUM 100 MG: 100 CAPSULE, LIQUID FILLED ORAL at 08:07

## 2020-02-29 RX ADMIN — ENOXAPARIN SODIUM 40 MG: 40 INJECTION SUBCUTANEOUS at 08:08

## 2020-02-29 RX ADMIN — NYSTATIN 500000 UNITS: 500000 SUSPENSION ORAL at 17:06

## 2020-02-29 RX ADMIN — FAMOTIDINE 40 MG: 20 TABLET, FILM COATED ORAL at 08:07

## 2020-02-29 RX ADMIN — CETIRIZINE HYDROCHLORIDE 5 MG: 10 TABLET, FILM COATED ORAL at 08:07

## 2020-02-29 RX ADMIN — FUROSEMIDE 40 MG: 40 TABLET ORAL at 08:07

## 2020-02-29 RX ADMIN — TRAMADOL HYDROCHLORIDE 50 MG: 50 TABLET, FILM COATED ORAL at 20:35

## 2020-02-29 RX ADMIN — MICONAZOLE NITRATE: 20 POWDER TOPICAL at 08:08

## 2020-02-29 RX ADMIN — Medication 10 ML: at 08:09

## 2020-02-29 RX ADMIN — DICLOFENAC 2 G: 10 GEL TOPICAL at 08:07

## 2020-02-29 RX ADMIN — POTASSIUM BICARBONATE 20 MEQ: 782 TABLET, EFFERVESCENT ORAL at 08:08

## 2020-02-29 RX ADMIN — GABAPENTIN 300 MG: 300 CAPSULE ORAL at 20:34

## 2020-02-29 RX ADMIN — GABAPENTIN 300 MG: 300 CAPSULE ORAL at 08:07

## 2020-02-29 RX ADMIN — CARVEDILOL 6.25 MG: 6.25 TABLET, FILM COATED ORAL at 17:06

## 2020-02-29 RX ADMIN — DICLOFENAC 2 G: 10 GEL TOPICAL at 20:34

## 2020-02-29 RX ADMIN — LEVOTHYROXINE SODIUM 112 MCG: 112 TABLET ORAL at 05:15

## 2020-02-29 RX ADMIN — PHENYTOIN SODIUM 500 MG: 100 CAPSULE ORAL at 20:34

## 2020-02-29 RX ADMIN — TRAMADOL HYDROCHLORIDE 50 MG: 50 TABLET, FILM COATED ORAL at 05:16

## 2020-02-29 RX ADMIN — LOSARTAN POTASSIUM 100 MG: 50 TABLET, FILM COATED ORAL at 08:07

## 2020-02-29 RX ADMIN — NYSTATIN 500000 UNITS: 500000 SUSPENSION ORAL at 20:34

## 2020-02-29 RX ADMIN — CARVEDILOL 6.25 MG: 6.25 TABLET, FILM COATED ORAL at 08:07

## 2020-02-29 RX ADMIN — NYSTATIN 500000 UNITS: 500000 SUSPENSION ORAL at 08:07

## 2020-02-29 ASSESSMENT — PAIN SCALES - GENERAL
PAINLEVEL_OUTOF10: 10
PAINLEVEL_OUTOF10: 8

## 2020-02-29 ASSESSMENT — PAIN DESCRIPTION - PAIN TYPE: TYPE: CHRONIC PAIN

## 2020-02-29 NOTE — PROGRESS NOTES
Pt took medications well. She was concerned about getting up and her feet hurting from neuropathy. She was concerned about going home and not being able to get up. She stated that her daughter was pregnant and her  wasnt able to take care of her because he was sicker than her. I explained that she wouldn't be safe to go home then if they cant take care of you. She then said that her  can help turn her to change her. I explained how important doing exercising and keeping a meal plan is so she keeps loosing weight, making it easier on her body to hold her weight up on her feet. Her continuing to loose weight will help her joints and hopefully ease some of the neuropathy pain, also working with her doctor to get her medication fixed to help neuropathy. I explained it is going to be up to her as to how well she does at home.

## 2020-02-29 NOTE — FLOWSHEET NOTE
02/28/20 2003   Assessment   Charting Type Shift assessment   Neurological   Neuro (WDL) WDL   Level of Consciousness 0   HEENT   HEENT (WDL) X   Right Eye Impaired vision   Left Eye Impaired vision   Tongue Coated   Voice Normal   Mucous Membrane Pink;Moist   Teeth Missing teeth   Respiratory   Respiratory (WDL) WDL   Respiratory Pattern Regular   Respiratory Depth Normal   Respiratory Quality/Effort Unlabored   Chest Assessment Chest expansion symmetrical;Trachea midline   L Breath Sounds Clear;Diminished   R Breath Sounds Clear;Diminished   Breath Sounds   Right Upper Lobe Clear   Right Middle Lobe Diminished   Right Lower Lobe Diminished   Left Upper Lobe Clear   Left Lower Lobe Diminished   Cough/Sputum   Cough Congested   Sputum Amount None   Sputum Color Yellow   Incentive Spirometry Tx   Respiratory Effort Dyspnea with Exertion   Cardiac   Cardiac (WDL) WDL   Cardiac Monitor   Telemetry Monitor On No   Gastrointestinal   Abdominal (WDL) X   RUQ Bowel Sounds Active   LUQ Bowel Sounds Active   RLQ Bowel Sounds Active   LLQ Bowel Sounds Active   Abdomen Inspection Rotund;Rounded   Tenderness Soft;Nontender   Peripheral Vascular   Peripheral Vascular (WDL) X   Edema Right lower extremity; Left lower extremity;Generalized   Edema Generalized Non-pitting   RLE Edema +3;Pitting   LLE Edema +3;Pitting   RUE Neurovascular Assessment   Capillary Refill Less than/equal to 3 seconds   Color Pink   Temperature Warm   LUE Neurovascular Assessment   Capillary Refill Less than/equal to 3 seconds   Color Pink   Temperature Warm   RLE Neurovascular Assessment   Capillary Refill Less than/equal to 3 seconds   Color Pink   Temperature Warm   LLE Neurovascular Assessment   Capillary Refill Less than/equal to 3 seconds   Color Pink   Temperature Warm   Skin Color/Condition   Skin Color/Condition (WDL) X   Skin Color Appropriate for ethnicity   Skin Condition/Temp Dry;Flaky; Warm   Skin Integrity   Skin Integrity (WDL) X   Skin

## 2020-03-01 PROCEDURE — 6370000000 HC RX 637 (ALT 250 FOR IP): Performed by: PHYSICIAN ASSISTANT

## 2020-03-01 PROCEDURE — 2500000003 HC RX 250 WO HCPCS: Performed by: PHYSICIAN ASSISTANT

## 2020-03-01 PROCEDURE — 2580000003 HC RX 258: Performed by: PHYSICIAN ASSISTANT

## 2020-03-01 PROCEDURE — 6360000002 HC RX W HCPCS: Performed by: PHYSICIAN ASSISTANT

## 2020-03-01 PROCEDURE — 1200000002 HC SEMI PRIVATE SWING BED

## 2020-03-01 RX ORDER — SENNA AND DOCUSATE SODIUM 50; 8.6 MG/1; MG/1
2 TABLET, FILM COATED ORAL DAILY
Status: DISCONTINUED | OUTPATIENT
Start: 2020-03-02 | End: 2020-03-13 | Stop reason: HOSPADM

## 2020-03-01 RX ORDER — POLYETHYLENE GLYCOL 3350 17 G/17G
17 POWDER, FOR SOLUTION ORAL DAILY PRN
Status: DISCONTINUED | OUTPATIENT
Start: 2020-03-01 | End: 2020-03-13 | Stop reason: HOSPADM

## 2020-03-01 RX ORDER — LANOLIN ALCOHOL/MO/W.PET/CERES
CREAM (GRAM) TOPICAL 2 TIMES DAILY PRN
Status: DISCONTINUED | OUTPATIENT
Start: 2020-03-01 | End: 2020-03-13 | Stop reason: HOSPADM

## 2020-03-01 RX ADMIN — MICONAZOLE NITRATE: 20 POWDER TOPICAL at 10:38

## 2020-03-01 RX ADMIN — ACETAMINOPHEN 650 MG: 325 TABLET, FILM COATED ORAL at 01:37

## 2020-03-01 RX ADMIN — FUROSEMIDE 40 MG: 40 TABLET ORAL at 07:56

## 2020-03-01 RX ADMIN — Medication 10 ML: at 10:39

## 2020-03-01 RX ADMIN — DICLOFENAC 2 G: 10 GEL TOPICAL at 07:57

## 2020-03-01 RX ADMIN — MICONAZOLE NITRATE: 20 POWDER TOPICAL at 20:35

## 2020-03-01 RX ADMIN — CARVEDILOL 6.25 MG: 6.25 TABLET, FILM COATED ORAL at 16:49

## 2020-03-01 RX ADMIN — POLYETHYLENE GLYCOL 3350 17 G: 17 POWDER, FOR SOLUTION ORAL at 07:57

## 2020-03-01 RX ADMIN — TRAMADOL HYDROCHLORIDE 50 MG: 50 TABLET, FILM COATED ORAL at 20:34

## 2020-03-01 RX ADMIN — CARVEDILOL 6.25 MG: 6.25 TABLET, FILM COATED ORAL at 07:57

## 2020-03-01 RX ADMIN — POTASSIUM BICARBONATE 20 MEQ: 782 TABLET, EFFERVESCENT ORAL at 07:57

## 2020-03-01 RX ADMIN — PHENYTOIN SODIUM 500 MG: 100 CAPSULE ORAL at 20:33

## 2020-03-01 RX ADMIN — NYSTATIN 500000 UNITS: 500000 SUSPENSION ORAL at 07:57

## 2020-03-01 RX ADMIN — CETIRIZINE HYDROCHLORIDE 5 MG: 10 TABLET, FILM COATED ORAL at 07:56

## 2020-03-01 RX ADMIN — Medication: at 10:38

## 2020-03-01 RX ADMIN — ENOXAPARIN SODIUM 40 MG: 40 INJECTION SUBCUTANEOUS at 07:57

## 2020-03-01 RX ADMIN — LOSARTAN POTASSIUM 100 MG: 50 TABLET, FILM COATED ORAL at 07:56

## 2020-03-01 RX ADMIN — TRAMADOL HYDROCHLORIDE 50 MG: 50 TABLET, FILM COATED ORAL at 08:05

## 2020-03-01 RX ADMIN — NYSTATIN 500000 UNITS: 500000 SUSPENSION ORAL at 16:49

## 2020-03-01 RX ADMIN — GABAPENTIN 300 MG: 300 CAPSULE ORAL at 20:34

## 2020-03-01 RX ADMIN — FAMOTIDINE 40 MG: 20 TABLET, FILM COATED ORAL at 07:56

## 2020-03-01 RX ADMIN — DOCUSATE SODIUM 100 MG: 100 CAPSULE, LIQUID FILLED ORAL at 07:56

## 2020-03-01 RX ADMIN — GABAPENTIN 300 MG: 300 CAPSULE ORAL at 07:56

## 2020-03-01 RX ADMIN — LEVOTHYROXINE SODIUM 112 MCG: 112 TABLET ORAL at 05:28

## 2020-03-01 RX ADMIN — DICLOFENAC 2 G: 10 GEL TOPICAL at 20:35

## 2020-03-01 RX ADMIN — NYSTATIN 500000 UNITS: 500000 SUSPENSION ORAL at 20:34

## 2020-03-01 ASSESSMENT — PAIN DESCRIPTION - PAIN TYPE
TYPE: CHRONIC PAIN
TYPE: CHRONIC PAIN

## 2020-03-01 ASSESSMENT — PAIN SCALES - GENERAL
PAINLEVEL_OUTOF10: 8
PAINLEVEL_OUTOF10: 8
PAINLEVEL_OUTOF10: 9

## 2020-03-01 NOTE — PLAN OF CARE
Problem: Pain Control  Goal: Maintain pain level at or below patient's acceptable level (or 5 if patient is unable to determine acceptable level)  3/1/2020 0943 by Joon Red RN  Outcome: Ongoing  2/29/2020 2143 by Carole Cali RN  Outcome: Ongoing     Problem: Respiratory  Goal: No pulmonary complications  1/49/4630 2143 by Carole Cali RN  Outcome: Ongoing     Problem:   Goal: Adequate urinary output  3/1/2020 0943 by Joon Red RN  Outcome: Ongoing  2/29/2020 2143 by Carole Cali RN  Outcome: Ongoing     Problem: Skin Integrity/Risk  Goal: No skin breakdown during hospitalization  3/1/2020 0943 by Joon Red RN  Outcome: Ongoing  2/29/2020 2143 by Carole Cali RN  Outcome: Ongoing  Goal: Wound healing  2/29/2020 2143 by Carole Cali RN  Outcome: Ongoing

## 2020-03-01 NOTE — FLOWSHEET NOTE
Skin Color/Condition   Skin Color/Condition (WDL) X   Skin Color Appropriate for ethnicity   Skin Condition/Temp Dry;Flaky; Warm   Skin Integrity   Skin Integrity (WDL) X   Skin Fold Management Yes   Dressing Site Abdominal pannus   Treatment Pharmaceutical   Date applied? 03/01/20   Assessed this shift Red;Moist   Multiple Skin Integrity Sites Yes   Skin Integrity Site 2   Skin Integrity Location 2 Redness   Location 2 buttocks   Preventative Dressing No   Assessed this shift? Yes   Dressing Site Sacrum   Skin Integrity Site 3   Skin Integrity Location 3 Rash;Redness    Location 3 bilateral breasts   Skin Integrity Site 4   Skin Integrity Location 4 Redness; Other (Comment)  (dry)   Location 4 face   Assessed this shift? Yes   Musculoskeletal   Musculoskeletal (WDL) X   RUE Limited movement   LUE Limited movement   RL Extremity Limited movement;Weakness; Swelling   LL Extremity Limited movement;Weakness; Swelling   Genitourinary   Genitourinary (WDL) X   Flank Tenderness No   Suprapubic Tenderness No   Dysuria No   Urine Assessment   Incontinence   (f/c)   Urine Color Yellow/straw   Urine Appearance Clear   Genitalia   Drainage Description Other (Comment)  (bloody discharge)   Wound 02/19/20 Heel Left skin intact black in color   Date First Assessed/Time First Assessed: 02/19/20 1530   Primary Wound Type: Pressure Injury  Location: Heel  Wound Location Orientation: Left  Wound Description (Comments): skin intact black in color   Dressing Status Clean;Dry; Intact   Dressing/Treatment Foam  (applied)   Judith-wound Assessment Intact   Wound 02/20/20 Buttocks Right Stage II Red in color   Date First Assessed/Time First Assessed: 02/20/20 0824   Primary Wound Type: Pressure Injury  Location: Buttocks  Wound Location Orientation: Right  Wound Description (Comments): Stage II Red in color   Dressing Status Clean;Dry; Intact   Dressing/Treatment Other (comment)  (harshil)   Urethral Catheter   Placement Date/Time: 02/19/20 1840 Catheter Balloon Size: 10 mL  Collection Container: Standard  Securement Method: Securing device (Describe)  Urine Returned: (c) Yes   Catheter Indications Other (specify)   Site Assessment Pink   Urine Color Yellow   Urine Appearance Clear   Urine Odor Malodorous   Psychosocial   Psychosocial (WDL) WDL

## 2020-03-02 PROCEDURE — 1200000002 HC SEMI PRIVATE SWING BED

## 2020-03-02 PROCEDURE — 6370000000 HC RX 637 (ALT 250 FOR IP): Performed by: PHYSICIAN ASSISTANT

## 2020-03-02 PROCEDURE — 6360000002 HC RX W HCPCS: Performed by: PHYSICIAN ASSISTANT

## 2020-03-02 PROCEDURE — 97530 THERAPEUTIC ACTIVITIES: CPT

## 2020-03-02 RX ADMIN — SENNOSIDES AND DOCUSATE SODIUM 2 TABLET: 8.6; 5 TABLET ORAL at 07:52

## 2020-03-02 RX ADMIN — MICONAZOLE NITRATE: 20 POWDER TOPICAL at 09:00

## 2020-03-02 RX ADMIN — MICONAZOLE NITRATE: 20 POWDER TOPICAL at 20:54

## 2020-03-02 RX ADMIN — CARVEDILOL 6.25 MG: 6.25 TABLET, FILM COATED ORAL at 07:54

## 2020-03-02 RX ADMIN — NYSTATIN 500000 UNITS: 500000 SUSPENSION ORAL at 17:29

## 2020-03-02 RX ADMIN — POTASSIUM BICARBONATE 20 MEQ: 782 TABLET, EFFERVESCENT ORAL at 07:52

## 2020-03-02 RX ADMIN — PHENYTOIN SODIUM 500 MG: 100 CAPSULE ORAL at 20:52

## 2020-03-02 RX ADMIN — ENOXAPARIN SODIUM 40 MG: 40 INJECTION SUBCUTANEOUS at 07:53

## 2020-03-02 RX ADMIN — CETIRIZINE HYDROCHLORIDE 5 MG: 10 TABLET, FILM COATED ORAL at 07:53

## 2020-03-02 RX ADMIN — DICLOFENAC 2 G: 10 GEL TOPICAL at 20:52

## 2020-03-02 RX ADMIN — GABAPENTIN 300 MG: 300 CAPSULE ORAL at 07:54

## 2020-03-02 RX ADMIN — NYSTATIN 500000 UNITS: 500000 SUSPENSION ORAL at 13:20

## 2020-03-02 RX ADMIN — FAMOTIDINE 40 MG: 20 TABLET, FILM COATED ORAL at 07:53

## 2020-03-02 RX ADMIN — NYSTATIN 500000 UNITS: 500000 SUSPENSION ORAL at 07:54

## 2020-03-02 RX ADMIN — LOSARTAN POTASSIUM 100 MG: 50 TABLET, FILM COATED ORAL at 07:53

## 2020-03-02 RX ADMIN — LEVOTHYROXINE SODIUM 112 MCG: 112 TABLET ORAL at 05:35

## 2020-03-02 RX ADMIN — NYSTATIN 500000 UNITS: 500000 SUSPENSION ORAL at 20:53

## 2020-03-02 RX ADMIN — TRAMADOL HYDROCHLORIDE 50 MG: 50 TABLET, FILM COATED ORAL at 05:35

## 2020-03-02 RX ADMIN — FUROSEMIDE 40 MG: 40 TABLET ORAL at 07:53

## 2020-03-02 RX ADMIN — ACETAMINOPHEN 650 MG: 325 TABLET, FILM COATED ORAL at 20:52

## 2020-03-02 RX ADMIN — GABAPENTIN 300 MG: 300 CAPSULE ORAL at 20:53

## 2020-03-02 RX ADMIN — CARVEDILOL 6.25 MG: 6.25 TABLET, FILM COATED ORAL at 17:29

## 2020-03-02 RX ADMIN — TRAMADOL HYDROCHLORIDE 50 MG: 50 TABLET, FILM COATED ORAL at 18:44

## 2020-03-02 RX ADMIN — DICLOFENAC 2 G: 10 GEL TOPICAL at 07:52

## 2020-03-02 ASSESSMENT — PAIN SCALES - GENERAL
PAINLEVEL_OUTOF10: 10
PAINLEVEL_OUTOF10: 7
PAINLEVEL_OUTOF10: 8

## 2020-03-02 NOTE — PROGRESS NOTES
services on this date. Orientation     Objective                Bed mobility  Rolling to Left: Moderate assistance  Rolling to Right: Moderate assistance  Supine to Sit: Modified independent  Sit to Supine: Moderate assistance;2 Person assistance  Scooting: Minimal assistance  Transfers  Sit to stand: Moderate assistance;2 Person assistance         Type of ROM/Therapeutic Exercise  Type of ROM/Therapeutic Exercise: AROM  Exercises  Scapular Retraction: x20 2 sets                    Plan   Plan  Times per week: 3-5  Times per day: Daily  Plan weeks: 1  Current Treatment Recommendations: Strengthening, Endurance Training, Functional Mobility Training, Positioning, Safety Education & Training, Self-Care / ADL, Patient/Caregiver Education & Training    Goals  Short term goals  Time Frame for Short term goals: 1 week  Short term goal 1: Pt to come to sit at EOB with MOD I. GOAL MET  Short term goal 2: Pt to complete toilet transfer with min assist using RW. GOAL NOT MET  Short term goal 3: Pt to complete toileting with MOD A. GOAL NOT MET  Short term goal 4: Pt to complete dressing with min assist. GOAL NOT MET  Short term goal 5: pt to tolerate x15 minutes of activity to increase functional activity tolerance. GOAL NOT MET  SECONDARY TO PAIN       Long term goals  Time Frame for Long term goals : 2 week  Long term goal 1: Pt to complete toileting with SBA. GOAL NOT MET  Long term goal 2: Pt to complete dressing with CGA. GOAL NOT MET  \       Therapy Time   Individual Concurrent Group Co-treatment   Time In 6489         Time Out 9745         Minutes 59              This note serves as a DC summary in the event of pt discharge.    Palmira Restrepo, OTR/L

## 2020-03-02 NOTE — PROGRESS NOTES
Pt refusing labs this am. When educating pt on the importance of her cooperating with her POC and why we needed to draw her labs this AM  pt stated that she \"was not playing our games and will not be giving any blood\". Pt has no complaints att. Call bell within reach, will continue to monitor pt.

## 2020-03-02 NOTE — PROGRESS NOTES
Physical Therapy  Facility/Department: [facility] MED SURG  14 Day Re-Assessment  NAME: Giuliano Coulter  : 1950  MRN: [id number]    Date of Service: 3/2/2020    Discharge Recommendations:  Continue to assess pending progress        Assessment   Body structures, Functions, Activity limitations: Decreased functional mobility ; Decreased high-level IADLs;Decreased ROM; Decreased strength; Increased pain  Assessment: Pt demonstrated improved motivation today and was able to move from supine to sit x Mod I.  Pt stood x 2 trials, 60 sec and 35 sec. Pt is progressing with functional mobility overall since starting rehab. She will continue to benefit from skilled PT to further address functional mobility and allow for return to optimal functional level. Treatment Diagnosis: functional decline  Prognosis: Fair  Decision Making: Medium Complexity  REQUIRES PT FOLLOW UP: Yes  Activity Tolerance  Activity Tolerance: Patient Tolerated treatment well;Patient limited by endurance  Activity Tolerance: limited due to morbid obesity     Patient Diagnosis(es): There were no encounter diagnoses. has a past medical history of Hypertension, Orthopnea, Seizures ([address] and Thyroid disease. has a past surgical history that includes Cholecystectomy. Restrictions  Restrictions/Precautions  Restrictions/Precautions: Fall Risk, General Precautions  Required Braces or Orthoses?: No     Subjective   General  Chart Reviewed: Yes  Family / Caregiver Present: No  Referring Practitioner: Jearld Denver, PA-C  Subjective  Subjective: Pt presents supine in bed, she is agreeable to work with PT / OT, pt is more motivated today. Orientation  Orientation  Overall Orientation Status: Within Normal Limits    Objective   Bed mobility  Rolling to Left: Moderate assistance  Rolling to Right: Moderate assistance  Supine to Sit: Modified independent  Sit to Supine:  Moderate assistance;2 Person assistance  Scooting: Minimal assistance  Transfers  Sit to Stand: 2 Person Assistance;Minimal Assistance  Stand to sit: Minimal Assistance  Ambulation  Ambulation?: No     Balance  Sitting - Static: Good;-  Sitting - Dynamic: Good;-      AROM RLE (degrees)  RLE AROM: Exceptions  RLE General AROM: limited ROM due to morbid obesity  AROM LLE (degrees)  LLE AROM : Exceptions  LLE General AROM: limited ROM due to morbid obesity and pain                   Goals  Short term goals  Time Frame for Short term goals: 1 week  Short term goal 1: Pt to be able to sit EOB x SBA x 30 minutes. Short term goal 2: Pt to stand x 2 minutes with a walker x CGA  Short term goal 3: Pt to be able to perform a stand pivot transfer x Min A x 2. Short term goal 4: Pt to move supine to sit x Mod A and vice versa. Plan    Plan  Times per week: 4-5 days per week  Plan weeks: 1-2  Current Treatment Recommendations: Strengthening, Gait Training, Patient/Caregiver Education & Training, ROM, Equipment Evaluation, Education, & procurement, Pain Management, Positioning, Home Exercise Program, Endurance Training, Functional Mobility Training, Safety Education & Training, Transfer Training  Safety Devices  Type of devices: Left in bed, Call light within reach     Therapy Time   Individual Concurrent Group Co-treatment   Time In 0152         Time Out 1143         Minutes Nichol, PT     This note serves as D/C summary if patient is discharged prior to next visit.

## 2020-03-03 PROCEDURE — 97110 THERAPEUTIC EXERCISES: CPT

## 2020-03-03 PROCEDURE — 97530 THERAPEUTIC ACTIVITIES: CPT

## 2020-03-03 PROCEDURE — 6370000000 HC RX 637 (ALT 250 FOR IP): Performed by: PHYSICIAN ASSISTANT

## 2020-03-03 PROCEDURE — 97803 MED NUTRITION INDIV SUBSEQ: CPT

## 2020-03-03 PROCEDURE — 2580000003 HC RX 258: Performed by: PHYSICIAN ASSISTANT

## 2020-03-03 PROCEDURE — 1200000002 HC SEMI PRIVATE SWING BED

## 2020-03-03 PROCEDURE — 6360000002 HC RX W HCPCS: Performed by: PHYSICIAN ASSISTANT

## 2020-03-03 RX ADMIN — TRAMADOL HYDROCHLORIDE 50 MG: 50 TABLET, FILM COATED ORAL at 01:14

## 2020-03-03 RX ADMIN — DICLOFENAC 2 G: 10 GEL TOPICAL at 20:15

## 2020-03-03 RX ADMIN — GABAPENTIN 300 MG: 300 CAPSULE ORAL at 08:10

## 2020-03-03 RX ADMIN — CETIRIZINE HYDROCHLORIDE 5 MG: 10 TABLET, FILM COATED ORAL at 08:11

## 2020-03-03 RX ADMIN — CARVEDILOL 6.25 MG: 6.25 TABLET, FILM COATED ORAL at 17:56

## 2020-03-03 RX ADMIN — FAMOTIDINE 40 MG: 20 TABLET, FILM COATED ORAL at 08:10

## 2020-03-03 RX ADMIN — DICLOFENAC 2 G: 10 GEL TOPICAL at 08:11

## 2020-03-03 RX ADMIN — LEVOTHYROXINE SODIUM 112 MCG: 112 TABLET ORAL at 08:10

## 2020-03-03 RX ADMIN — GABAPENTIN 300 MG: 300 CAPSULE ORAL at 20:15

## 2020-03-03 RX ADMIN — NYSTATIN 500000 UNITS: 500000 SUSPENSION ORAL at 17:56

## 2020-03-03 RX ADMIN — NYSTATIN 500000 UNITS: 500000 SUSPENSION ORAL at 20:15

## 2020-03-03 RX ADMIN — NYSTATIN 500000 UNITS: 500000 SUSPENSION ORAL at 08:11

## 2020-03-03 RX ADMIN — POTASSIUM BICARBONATE 20 MEQ: 782 TABLET, EFFERVESCENT ORAL at 08:10

## 2020-03-03 RX ADMIN — FUROSEMIDE 40 MG: 40 TABLET ORAL at 08:11

## 2020-03-03 RX ADMIN — SENNOSIDES AND DOCUSATE SODIUM 2 TABLET: 8.6; 5 TABLET ORAL at 08:11

## 2020-03-03 RX ADMIN — MICONAZOLE NITRATE: 20 POWDER TOPICAL at 20:16

## 2020-03-03 RX ADMIN — Medication 10 ML: at 20:16

## 2020-03-03 RX ADMIN — PHENYTOIN SODIUM 500 MG: 100 CAPSULE ORAL at 20:15

## 2020-03-03 RX ADMIN — MICONAZOLE NITRATE: 20 POWDER TOPICAL at 08:13

## 2020-03-03 RX ADMIN — ENOXAPARIN SODIUM 40 MG: 40 INJECTION SUBCUTANEOUS at 08:13

## 2020-03-03 RX ADMIN — CARVEDILOL 6.25 MG: 6.25 TABLET, FILM COATED ORAL at 08:10

## 2020-03-03 RX ADMIN — NYSTATIN 500000 UNITS: 500000 SUSPENSION ORAL at 12:32

## 2020-03-03 RX ADMIN — LOSARTAN POTASSIUM 100 MG: 50 TABLET, FILM COATED ORAL at 08:10

## 2020-03-03 ASSESSMENT — PAIN SCALES - GENERAL: PAINLEVEL_OUTOF10: 10

## 2020-03-03 ASSESSMENT — PAIN DESCRIPTION - ORIENTATION: ORIENTATION: RIGHT;LEFT

## 2020-03-03 ASSESSMENT — PAIN DESCRIPTION - PAIN TYPE: TYPE: CHRONIC PAIN

## 2020-03-03 ASSESSMENT — PAIN DESCRIPTION - LOCATION: LOCATION: FOOT

## 2020-03-03 NOTE — PROGRESS NOTES
Occupational Therapy  Facility/Department: [facility] MED SURG  Daily Treatment Note  NAME: Jim Gomez  : 1950  MRN: [id number]    Date of Service: 3/3/2020    Discharge Recommendations:  Continue to assess pending progress       Assessment   Assessment: Pt agreeable to OT services. Pt tearful and reporting she is upset and wants to return home. Pt tolerated UE ther ex lying supine in bed. No c/o pain during tx. Lunch tray provided and session ended. Patient Diagnosis(es): There were no encounter diagnoses. has a past medical history of Hypertension, Orthopnea, Seizures ([address] and Thyroid disease. has a past surgical history that includes Cholecystectomy. Restrictions  Restrictions/Precautions  Restrictions/Precautions: Fall Risk, General Precautions  Required Braces or Orthoses?: No  Subjective   General  Chart Reviewed: Yes  Patient assessed for rehabilitation services?: Yes  Family / Caregiver Present: No  Referring Practitioner: GARO Delgadillo  Diagnosis: Generalized weakness, UTI,   Subjective  Subjective: Pt reports pain in BLE feet and back. Agreeable to attempt therapy services on this date.        Orientation     Objective          Type of ROM/Therapeutic Exercise  Type of ROM/Therapeutic Exercise: AROM  Exercises  Scapular Protraction: x10  Scapular Retraction: x10  Shoulder Elevation: x10  Shoulder Flexion: x10  Shoulder ABduction: x10  Horizontal ABduction: x10  Horizontal ADduction: x10  Elbow Flexion: x10  Elbow Extension: x10  Supination: x10  Pronation: x10  Wrist Flexion: x10  Wrist Extension: x10                    Plan   Plan  Times per week: 3-5  Times per day: Daily  Plan weeks: 1  Current Treatment Recommendations: Strengthening, Endurance Training, Functional Mobility Training, Positioning, Safety Education & Training, Self-Care / ADL, Patient/Caregiver Education & Training    Goals  Short term goals  Time Frame for Short term goals: 1 week  Short term goal 1: Pt to complete bed mobility with MOD I. Short term goal 2: Pt to complete toilet transfer with mod assist using RW. Short term goal 3: Pt to complete toileting with MOD A. Short term goal 4: Pt to complete dressing with MOD assist.   Short term goal 5: pt to tolerate x15 minutes of activity to increase functional activity tolerance. Long term goals  Time Frame for Long term goals : 2 week  Long term goal 1: Pt to complete toileting with CGA. Long term goal 2: Pt to complete dressing with CGA. Long term goal 3: Pt to complete toileting with MIN A. Therapy Time   Individual Concurrent Group Co-treatment   Time In 1125         Time Out 7584         Minutes 18              This note serves as a DC summary in the event of pt discharge.      Palmira Restrepo, OTR/L

## 2020-03-03 NOTE — PROGRESS NOTES
Physical Therapy  Facility/Department: [facility] MED SURG  Daily Treatment Note  NAME: Jadon Gonzalez  : 1950  MRN: [id number]    Date of Service: 3/3/2020    Discharge Recommendations:  Continue to assess pending progress        Assessment   Body structures, Functions, Activity limitations: Decreased functional mobility ; Decreased high-level IADLs;Decreased ROM; Decreased strength; Increased pain  Assessment: Pt was able to move from supine to sit x Mod I.  Pt stood x 2 trials, 60 sec and 30 sec. Treatment Diagnosis: functional decline  Prognosis: Fair  Decision Making: Medium Complexity  REQUIRES PT FOLLOW UP: Yes  Activity Tolerance  Activity Tolerance: Patient Tolerated treatment well;Patient limited by endurance  Activity Tolerance: limited due to morbid obesity     Patient Diagnosis(es): There were no encounter diagnoses. has a past medical history of Hypertension, Orthopnea, Seizures ([address] and Thyroid disease. has a past surgical history that includes Cholecystectomy. Restrictions  Restrictions/Precautions  Restrictions/Precautions: Fall Risk, General Precautions  Required Braces or Orthoses?: No  Subjective   General  Chart Reviewed: Yes  Family / Caregiver Present: No  Referring Practitioner: Rafita Chacon PA-C  Subjective  Subjective: Pt presents supine in bed, she is agreeable to work with PT / OT. Orientation  Orientation  Overall Orientation Status: Within Normal Limits  Cognition      Objective      Transfers  Sit to Stand: 2 Person Assistance;Minimal Assistance  Stand to sit: Minimal Assistance  Ambulation  Ambulation?: No      Goals  Short term goals  Time Frame for Short term goals: 1 week  Short term goal 1: Pt to be able to sit EOB x SBA x 30 minutes. Short term goal 2: Pt to stand x 2 minutes with a walker x CGA  Short term goal 3: Pt to be able to perform a stand pivot transfer x Min A x 2. Short term goal 4: Pt to move supine to sit x Mod A and vice versa.      Plan

## 2020-03-03 NOTE — FLOWSHEET NOTE
03/03/20 0905   Assessment   Charting Type Shift assessment   Neurological   Neuro (WDL) WDL   Level of Consciousness 0   Painter Coma Scale   Eye Opening 4   Best Verbal Response 5   Best Motor Response 6   Huy Coma Scale Score 15   HEENT   HEENT (WDL) X   Right Eye Impaired vision   Left Eye Impaired vision   Tongue Coated   Voice Normal   Mucous Membrane Pink;Moist   Teeth Missing teeth   Respiratory   Respiratory (WDL) WDL   Respiratory Pattern Regular   Respiratory Depth Normal   Respiratory Quality/Effort Unlabored   Chest Assessment Chest expansion symmetrical;Trachea midline   L Breath Sounds Clear;Diminished   R Breath Sounds Clear;Diminished   Breath Sounds   Right Upper Lobe Clear   Right Middle Lobe Diminished   Right Lower Lobe Diminished   Left Upper Lobe Clear   Left Lower Lobe Diminished   Cardiac   Cardiac (WDL) WDL   Cardiac Monitor   Telemetry Monitor On No   Gastrointestinal   Abdominal (WDL) X   RUQ Bowel Sounds Active   LUQ Bowel Sounds Active   RLQ Bowel Sounds Active   LLQ Bowel Sounds Active   Abdomen Inspection Rotund;Rounded   Tenderness Soft;Nontender   Peripheral Vascular   Peripheral Vascular (WDL) X   Edema Right lower extremity; Left lower extremity;Generalized   Edema Generalized Non-pitting   RLE Edema +3;Pitting   LLE Edema +3;Pitting   RUE Neurovascular Assessment   Capillary Refill Less than/equal to 3 seconds   Color Pink   Temperature Warm   LUE Neurovascular Assessment   Capillary Refill Less than/equal to 3 seconds   Color Pink   Temperature Warm   RLE Neurovascular Assessment   Capillary Refill Less than/equal to 3 seconds   Color Pink   Temperature Warm   LLE Neurovascular Assessment   Capillary Refill Less than/equal to 3 seconds   Color Pink   Temperature Warm   Skin Color/Condition   Skin Color/Condition (WDL) X   Skin Color Appropriate for ethnicity   Skin Integrity   Skin Integrity (WDL) X   Skin Integrity Other (Comment)  (dry; flaky)   Location ble Preventative Dressing No   Assessed this shift? Yes   Dressing Site Abdominal pannus   Treatment Pharmaceutical   Date applied? 03/03/20   Assessed this shift Red   Skin Integrity Site 2   Skin Integrity Location 2 Redness   Location 2 buttocks   Preventative Dressing No   Assessed this shift? Yes   Dressing Site Sacrum   Skin Integrity Site 3   Skin Integrity Location 3 Redness    Location 3 face   Preventative Dressing No   Musculoskeletal   Musculoskeletal (WDL) X   RUE Limited movement   LUE Limited movement   RL Extremity Limited movement;Weakness; Swelling   LL Extremity Limited movement;Weakness; Swelling   Genitourinary   Genitourinary (WDL) X   Flank Tenderness No   Suprapubic Tenderness No   Dysuria No   Wound 02/19/20 Heel Left skin intact black in color   Date First Assessed/Time First Assessed: 02/19/20 1530   Primary Wound Type: Pressure Injury  Location: Heel  Wound Location Orientation: Left  Wound Description (Comments): skin intact black in color   Wound Deep tissue/Injury   Offloading for Diabetic Foot Ulcers Offloading boot; Other (comment)  (up on pillow)   Dressing Status Clean;Dry; Intact   Dressing Changed Changed/New   Dressing/Treatment Foam   Wound Cleansed Rinsed/Irrigated with saline   Dressing Change Due 03/06/20   Judith-wound Assessment Intact   Wound 02/20/20 Buttocks Right Stage II Red in color   Date First Assessed/Time First Assessed: 02/20/20 0824   Primary Wound Type: Pressure Injury  Location: Buttocks  Wound Location Orientation: Right  Wound Description (Comments): Stage II Red in color   Wound Pressure Stage  2   Dressing Status Clean;Dry; Intact   Dressing/Treatment Other (comment)  (harshil)   Wound Cleansed Soap and water   Urethral Catheter   Placement Date/Time: 02/19/20 1840   Catheter Balloon Size: 10 mL  Collection Container: Standard  Securement Method: Securing device (Describe)  Urine Returned: (c) Yes   Catheter Indications Other (specify)   Site Assessment Pink   Urine Color Yellow   Urine Appearance Clear   Psychosocial   Psychosocial (WDL) WDL   Deep pressure injury to left heel. Cleansed area with NSS and applied mepillex. Pt has pressure relief boots on and left leg is elevated on pillow.

## 2020-03-03 NOTE — PROGRESS NOTES
Occupational Therapy  Facility/Department: [facility] MED SURG  Daily Treatment Note  NAME: Jim Gomez  : 1950  MRN: [id number]    Date of Service: 3/3/2020    Discharge Recommendations:  Continue to assess pending progress       Assessment   Assessment: Co tx with PTA. Pt come to sit at EOB with MOD I on this date. Pt able to tolerate sitting upright at EOB without support. Pt come to stand with mod x2 assist 1 minute and 30 seconds on second attempt. Pt talking excessively during tx requiring cues to stay on task. Pt required max x2 to come to supine. Patient Diagnosis(es): There were no encounter diagnoses. has a past medical history of Hypertension, Orthopnea, Seizures ([address] and Thyroid disease. has a past surgical history that includes Cholecystectomy. Restrictions  Restrictions/Precautions  Restrictions/Precautions: Fall Risk, General Precautions  Required Braces or Orthoses?: No  Subjective   General  Chart Reviewed: Yes  Patient assessed for rehabilitation services?: Yes  Family / Caregiver Present: No  Referring Practitioner: GARO Delgadillo  Diagnosis: Generalized weakness, UTI,   Subjective  Subjective: Pt reports pain in BLE feet and back. Agreeable to attempt therapy services on this date.        Orientation     Objective       Type of ROM/Therapeutic Exercise  Type of ROM/Therapeutic Exercise: AROM  Exercises  Scapular Protraction: x10  Scapular Retraction: x10  Shoulder Elevation: x10  Shoulder Flexion: x10  Shoulder ABduction: x10  Horizontal ABduction: x10  Horizontal ADduction: x10  Elbow Flexion: x10  Elbow Extension: x10  Supination: x10  Pronation: x10  Wrist Flexion: x10  Wrist Extension: x10                    Plan   Plan  Times per week: 3-5  Times per day: Daily  Plan weeks: 1  Current Treatment Recommendations: Strengthening, Endurance Training, Functional Mobility Training, Positioning, Safety Education & Training, Self-Care / ADL, Patient/Caregiver

## 2020-03-04 PROCEDURE — 6370000000 HC RX 637 (ALT 250 FOR IP): Performed by: PHYSICIAN ASSISTANT

## 2020-03-04 PROCEDURE — 1200000002 HC SEMI PRIVATE SWING BED

## 2020-03-04 PROCEDURE — 6360000002 HC RX W HCPCS: Performed by: PHYSICIAN ASSISTANT

## 2020-03-04 PROCEDURE — 97110 THERAPEUTIC EXERCISES: CPT

## 2020-03-04 RX ADMIN — POTASSIUM BICARBONATE 20 MEQ: 782 TABLET, EFFERVESCENT ORAL at 09:21

## 2020-03-04 RX ADMIN — ACETAMINOPHEN 650 MG: 325 TABLET, FILM COATED ORAL at 09:27

## 2020-03-04 RX ADMIN — NYSTATIN 500000 UNITS: 500000 SUSPENSION ORAL at 09:22

## 2020-03-04 RX ADMIN — DICLOFENAC 2 G: 10 GEL TOPICAL at 09:22

## 2020-03-04 RX ADMIN — MICONAZOLE NITRATE: 20 POWDER TOPICAL at 20:04

## 2020-03-04 RX ADMIN — NYSTATIN 500000 UNITS: 500000 SUSPENSION ORAL at 12:10

## 2020-03-04 RX ADMIN — NYSTATIN 500000 UNITS: 500000 SUSPENSION ORAL at 17:10

## 2020-03-04 RX ADMIN — PHENYTOIN SODIUM 500 MG: 100 CAPSULE ORAL at 20:01

## 2020-03-04 RX ADMIN — TRAMADOL HYDROCHLORIDE 50 MG: 50 TABLET, FILM COATED ORAL at 20:01

## 2020-03-04 RX ADMIN — MICONAZOLE NITRATE: 20 POWDER TOPICAL at 09:31

## 2020-03-04 RX ADMIN — DICLOFENAC 2 G: 10 GEL TOPICAL at 20:01

## 2020-03-04 RX ADMIN — TRAMADOL HYDROCHLORIDE 50 MG: 50 TABLET, FILM COATED ORAL at 14:42

## 2020-03-04 RX ADMIN — NYSTATIN 500000 UNITS: 500000 SUSPENSION ORAL at 20:01

## 2020-03-04 RX ADMIN — FAMOTIDINE 40 MG: 20 TABLET, FILM COATED ORAL at 09:21

## 2020-03-04 RX ADMIN — CARVEDILOL 6.25 MG: 6.25 TABLET, FILM COATED ORAL at 09:21

## 2020-03-04 RX ADMIN — SENNOSIDES AND DOCUSATE SODIUM 2 TABLET: 8.6; 5 TABLET ORAL at 09:21

## 2020-03-04 RX ADMIN — LOSARTAN POTASSIUM 100 MG: 50 TABLET, FILM COATED ORAL at 09:21

## 2020-03-04 RX ADMIN — CARVEDILOL 6.25 MG: 6.25 TABLET, FILM COATED ORAL at 17:10

## 2020-03-04 RX ADMIN — GABAPENTIN 300 MG: 300 CAPSULE ORAL at 20:00

## 2020-03-04 RX ADMIN — GABAPENTIN 300 MG: 300 CAPSULE ORAL at 09:22

## 2020-03-04 RX ADMIN — CETIRIZINE HYDROCHLORIDE 5 MG: 10 TABLET, FILM COATED ORAL at 09:21

## 2020-03-04 RX ADMIN — FUROSEMIDE 40 MG: 40 TABLET ORAL at 09:21

## 2020-03-04 RX ADMIN — ENOXAPARIN SODIUM 40 MG: 40 INJECTION SUBCUTANEOUS at 09:23

## 2020-03-04 RX ADMIN — TRAMADOL HYDROCHLORIDE 50 MG: 50 TABLET, FILM COATED ORAL at 06:46

## 2020-03-04 RX ADMIN — LEVOTHYROXINE SODIUM 112 MCG: 112 TABLET ORAL at 06:47

## 2020-03-04 ASSESSMENT — PAIN SCALES - GENERAL
PAINLEVEL_OUTOF10: 8
PAINLEVEL_OUTOF10: 9
PAINLEVEL_OUTOF10: 10
PAINLEVEL_OUTOF10: 10

## 2020-03-04 NOTE — FLOWSHEET NOTE
Color/Condition   Skin Color/Condition (WDL) X   Skin Color Appropriate for ethnicity   Skin Condition/Temp Warm;Dry;Flaky   Skin Integrity   Skin Integrity (WDL) X   Skin Integrity Other (Comment)  (dry, flaky)   Location BLE   Assessed this shift? Yes   Skin Fold Management Yes   Dressing Site Abdominal pannus   Treatment Pharmaceutical   Date applied? 03/03/20   Assessed this shift Red   Multiple Skin Integrity Sites Yes   Skin Integrity Site 2   Skin Integrity Location 2 Redness   Location 2 buttocks   Preventative Dressing No   Dressing Site Sacrum   Musculoskeletal   Musculoskeletal (WDL) X   RUE Limited movement   LUE Limited movement   RL Extremity Limited movement;Weakness; Swelling   LL Extremity Limited movement;Weakness; Swelling   Genitourinary   Genitourinary (WDL) X   Flank Tenderness No   Suprapubic Tenderness No   Dysuria No   Urine Assessment   Incontinence   (f/c)   Urine Color Yellow/straw   Urine Appearance Clear   Wound 02/19/20 Heel Left skin intact black in color   Date First Assessed/Time First Assessed: 02/19/20 1530   Primary Wound Type: Pressure Injury  Location: Heel  Wound Location Orientation: Left  Wound Description (Comments): skin intact black in color   Dressing Status Clean;Dry; Intact   Dressing/Treatment Foam   Dressing Change Due 03/06/20   Judith-wound Assessment Intact   Wound 02/20/20 Buttocks Right Stage II Red in color   Date First Assessed/Time First Assessed: 02/20/20 0824   Primary Wound Type: Pressure Injury  Location: Buttocks  Wound Location Orientation: Right  Wound Description (Comments): Stage II Red in color   Dressing Status Clean;Dry; Intact   Dressing/Treatment Other (comment)  (harshil)   Urethral Catheter   Placement Date/Time: 02/19/20 1840   Catheter Balloon Size: 10 mL  Collection Container: Standard  Securement Method: Securing device (Describe)  Urine Returned: (c) Yes   Catheter Indications Other (specify)   Site Assessment Pink   Urine Color Yellow   Urine Appearance Clear   Urine Odor Malodorous   Psychosocial   Psychosocial (WDL) WDL

## 2020-03-04 NOTE — PROGRESS NOTES
Training, Self-Care / ADL, Patient/Caregiver Education & Training  G-Code     OutComes Score                                                  AM-PAC Score             Goals  Short term goals  Time Frame for Short term goals: 1 week  Short term goal 1: Pt to complete bed mobility with MOD I. Short term goal 2: Pt to complete toilet transfer with mod assist using RW. Short term goal 3: Pt to complete toileting with MOD A. Short term goal 4: Pt to complete dressing with MOD assist.   Short term goal 5: pt to tolerate x15 minutes of activity to increase functional activity tolerance. Long term goals  Time Frame for Long term goals : 2 week  Long term goal 1: Pt to complete toileting with CGA. Long term goal 2: Pt to complete dressing with CGA. Long term goal 3: Pt to complete toileting with MIN A. Therapy Time   Individual Concurrent Group Co-treatment   Time In 0246         Time Out 0300         Minutes 14              This note serves as a DC summary in the event of pt discharge.      Palmira Restrepo, OTR/L

## 2020-03-04 NOTE — PROGRESS NOTES
Patient refusing labs. Patient states she does not feel like it at this time. Explained importance of labs to patient, she continues to refuse.

## 2020-03-05 PROCEDURE — 97530 THERAPEUTIC ACTIVITIES: CPT

## 2020-03-05 PROCEDURE — 2500000003 HC RX 250 WO HCPCS: Performed by: PHYSICIAN ASSISTANT

## 2020-03-05 PROCEDURE — 6360000002 HC RX W HCPCS: Performed by: PHYSICIAN ASSISTANT

## 2020-03-05 PROCEDURE — 6370000000 HC RX 637 (ALT 250 FOR IP): Performed by: PHYSICIAN ASSISTANT

## 2020-03-05 PROCEDURE — 1200000002 HC SEMI PRIVATE SWING BED

## 2020-03-05 RX ADMIN — TRAMADOL HYDROCHLORIDE 50 MG: 50 TABLET, FILM COATED ORAL at 09:17

## 2020-03-05 RX ADMIN — LEVOTHYROXINE SODIUM 112 MCG: 112 TABLET ORAL at 06:23

## 2020-03-05 RX ADMIN — MICONAZOLE NITRATE: 20 POWDER TOPICAL at 20:14

## 2020-03-05 RX ADMIN — NYSTATIN 500000 UNITS: 500000 SUSPENSION ORAL at 12:30

## 2020-03-05 RX ADMIN — GABAPENTIN 300 MG: 300 CAPSULE ORAL at 20:12

## 2020-03-05 RX ADMIN — CARVEDILOL 6.25 MG: 6.25 TABLET, FILM COATED ORAL at 09:17

## 2020-03-05 RX ADMIN — NYSTATIN 500000 UNITS: 500000 SUSPENSION ORAL at 17:54

## 2020-03-05 RX ADMIN — SENNOSIDES AND DOCUSATE SODIUM 2 TABLET: 8.6; 5 TABLET ORAL at 09:17

## 2020-03-05 RX ADMIN — POTASSIUM BICARBONATE 20 MEQ: 782 TABLET, EFFERVESCENT ORAL at 09:17

## 2020-03-05 RX ADMIN — FAMOTIDINE 40 MG: 20 TABLET, FILM COATED ORAL at 09:17

## 2020-03-05 RX ADMIN — LOSARTAN POTASSIUM 100 MG: 50 TABLET, FILM COATED ORAL at 09:17

## 2020-03-05 RX ADMIN — CETIRIZINE HYDROCHLORIDE 5 MG: 10 TABLET, FILM COATED ORAL at 09:17

## 2020-03-05 RX ADMIN — GABAPENTIN 300 MG: 300 CAPSULE ORAL at 09:17

## 2020-03-05 RX ADMIN — MICONAZOLE NITRATE: 20 POWDER TOPICAL at 09:18

## 2020-03-05 RX ADMIN — NYSTATIN 500000 UNITS: 500000 SUSPENSION ORAL at 09:17

## 2020-03-05 RX ADMIN — CARVEDILOL 6.25 MG: 6.25 TABLET, FILM COATED ORAL at 17:54

## 2020-03-05 RX ADMIN — NYSTATIN 500000 UNITS: 500000 SUSPENSION ORAL at 20:12

## 2020-03-05 RX ADMIN — DICLOFENAC 2 G: 10 GEL TOPICAL at 20:14

## 2020-03-05 RX ADMIN — TRAMADOL HYDROCHLORIDE 50 MG: 50 TABLET, FILM COATED ORAL at 20:13

## 2020-03-05 RX ADMIN — DICLOFENAC 2 G: 10 GEL TOPICAL at 09:18

## 2020-03-05 RX ADMIN — PHENYTOIN SODIUM 500 MG: 100 CAPSULE ORAL at 20:12

## 2020-03-05 RX ADMIN — FUROSEMIDE 40 MG: 40 TABLET ORAL at 09:17

## 2020-03-05 RX ADMIN — ENOXAPARIN SODIUM 40 MG: 40 INJECTION SUBCUTANEOUS at 09:18

## 2020-03-05 ASSESSMENT — PAIN SCALES - GENERAL
PAINLEVEL_OUTOF10: 10
PAINLEVEL_OUTOF10: 10

## 2020-03-05 NOTE — PROGRESS NOTES
Patient/Caregiver Education & Training    Goals  Short term goals  Time Frame for Short term goals: 1 week  Short term goal 1: Pt to complete bed mobility with MOD I. Short term goal 2: Pt to complete toilet transfer with mod assist using RW. Short term goal 3: Pt to complete toileting with MOD A. Short term goal 4: Pt to complete dressing with MOD assist.   Short term goal 5: pt to tolerate x15 minutes of activity to increase functional activity tolerance. Long term goals  Time Frame for Long term goals : 2 week  Long term goal 1: Pt to complete toileting with CGA. Long term goal 2: Pt to complete dressing with CGA. Long term goal 3: Pt to complete toileting with MIN A. Therapy Time   Individual Concurrent Group Co-treatment   Time In 1001         Time Out 1102         Minutes 61              This note serves as a DC summary in the event of pt discharge.      Palmira Restrepo, OTR/L

## 2020-03-05 NOTE — FLOWSHEET NOTE
03/05/20 0929   Assessment   Charting Type Shift assessment   Neurological   Neuro (WDL) WDL   Level of Consciousness 0   [place] Coma Scale   Eye Opening 4   Best Verbal Response 5   Best Motor Response 6   Huy Coma Scale Score 15   NIH/MNHISS Stroke Scale   NIH/MNIHSS Stroke Scale Assessed No   HEENT   HEENT (WDL) X   Right Eye Impaired vision   Left Eye Impaired vision   Tongue Coated   Voice Normal   Mucous Membrane Pink;Moist   Teeth Missing teeth   Respiratory   Respiratory (WDL) WDL   Respiratory Pattern Regular   Respiratory Depth Normal   Respiratory Quality/Effort Unlabored   Chest Assessment Chest expansion symmetrical;Trachea midline   L Breath Sounds Clear;Diminished   R Breath Sounds Clear;Diminished   Breath Sounds   Right Upper Lobe Clear   Right Middle Lobe Diminished   Right Lower Lobe Diminished   Left Upper Lobe Clear   Left Lower Lobe Diminished   Cough/Sputum   Cough Congested   Sputum Amount None   Incentive Spirometry Tx   Respiratory Effort Dyspnea with Exertion   Cough and Deep Breathe   Cough and Deep Breathe Yes   Cardiac   Cardiac (WDL) WDL   Cardiac Monitor   Telemetry Monitor On No   Gastrointestinal   Abdominal (WDL) X   RUQ Bowel Sounds Active   LUQ Bowel Sounds Active   RLQ Bowel Sounds Active   LLQ Bowel Sounds Active   Abdomen Inspection Rotund;Rounded   Tenderness Soft;Nontender   Peripheral Vascular   Peripheral Vascular (WDL) X   Edema Right lower extremity; Left lower extremity;Generalized   Edema Generalized Non-pitting   RLE Edema +3   LLE Edema +3   RUE Neurovascular Assessment   Capillary Refill Less than/equal to 3 seconds   Color Pink   Temperature Warm   LUE Neurovascular Assessment   Capillary Refill Less than/equal to 3 seconds   Color Pink   Temperature Warm   RLE Neurovascular Assessment   Capillary Refill Less than/equal to 3 seconds   Color Pink   Temperature Warm   LLE Neurovascular Assessment   Capillary Refill Less than/equal to 3 seconds   Color Pink appears in no acute distress. Pt currently on RA. Pt lung sounds clear with diminished sounds in bases. Pt encouraged to cough and deep breathe. Pt states congested cough. Pt casas cath intact. Pt dressing on left foot CDI. Pt has edema in BLE +3. Pt call bell and bedside table within reach. Will continue to monitor pt.

## 2020-03-05 NOTE — PLAN OF CARE
Problem: Pain Control  Goal: Maintain pain level at or below patient's acceptable level (or 5 if patient is unable to determine acceptable level)  Outcome: Ongoing     Problem: Respiratory  Goal: O2 Sat > 90%  Outcome: Ongoing     Problem:   Goal: Adequate urinary output  Outcome: Ongoing     Problem: Skin Integrity/Risk  Goal: No skin breakdown during hospitalization  Outcome: Ongoing

## 2020-03-05 NOTE — PROGRESS NOTES
Physical Therapy  Facility/Department: [facility] MED SURG  Daily Treatment Note  NAME: Shaun Chi  : 1950  MRN: [id number]    Date of Service: 3/5/2020    Discharge Recommendations:  Continue to assess pending progress        Assessment   Body structures, Functions, Activity limitations: Decreased functional mobility ; Decreased high-level IADLs;Decreased ROM; Decreased strength; Increased pain  Assessment: Pt required max verbal cues for participation today. Pt was able to sit EOB for prolonged period. She stood briefly x 2 trials with improved form noted. PT and OT attempted encouraging pt to perform a stand pivot transfer to a chair, however pt declined. Pt stated she would possibly attempt this tomorrow. PT encouraged pt to be more pro-active regarding mobility and discussed her intentions for mobility upon returning home. Treatment Diagnosis: functional decline  Prognosis: Fair  Decision Making: Medium Complexity  REQUIRES PT FOLLOW UP: Yes  Activity Tolerance  Activity Tolerance: Patient Tolerated treatment well;Patient limited by endurance     Patient Diagnosis(es): There were no encounter diagnoses. has a past medical history of Hypertension, Orthopnea, Seizures ([address] and Thyroid disease. has a past surgical history that includes Cholecystectomy. Restrictions  Restrictions/Precautions  Restrictions/Precautions: Fall Risk, General Precautions  Required Braces or Orthoses?: No     Subjective   General  Chart Reviewed: Yes  Family / Caregiver Present: No  Referring Practitioner: Kartik Ryan PA-C  Subjective  Subjective: Pt presents supine in bed, she is agreeable to work with PT / OT.           Orientation  Orientation  Overall Orientation Status: Within Normal Limits    Objective   Bed mobility  Rolling to Left: Moderate assistance  Rolling to Right: Moderate assistance  Supine to Sit: Modified independent  Sit to Supine: Maximum assistance;2 Person assistance  Scooting: Contact guard assistance     Transfers  Sit to Stand: 2 Person Assistance;Minimal Assistance  Stand to sit: Minimal Assistance  Ambulation  Ambulation?: No     Balance  Sitting - Static: Good;-  Sitting - Dynamic: Good;-           Goals  Short term goals  Time Frame for Short term goals: 1 week  Short term goal 1: Pt to be able to sit EOB x SBA x 30 minutes. Short term goal 2: Pt to stand x 2 minutes with a walker x CGA  Short term goal 3: Pt to be able to perform a stand pivot transfer x Min A x 2. Short term goal 4: Pt to move supine to sit x Mod A and vice versa. Plan    Plan  Times per week: 4-5 days per week  Plan weeks: 1-2  Current Treatment Recommendations: Strengthening, Gait Training, Patient/Caregiver Education & Training, ROM, Equipment Evaluation, Education, & procurement, Pain Management, Positioning, Home Exercise Program, Endurance Training, Functional Mobility Training, Safety Education & Training, Transfer Training  Safety Devices  Type of devices: Left in bed, Call light within reach     Therapy Time   Individual Concurrent Group Co-treatment   Time In 1001         Time Out 1101         Minutes 1975 Melony Garcia, PT       This note serves as D/C summary if patient is discharged prior to next visit.

## 2020-03-05 NOTE — FLOWSHEET NOTE
seconds   Color Pink   Temperature Warm   LUE Neurovascular Assessment   Capillary Refill Less than/equal to 3 seconds   Color Pink   Temperature Warm   RLE Neurovascular Assessment   Capillary Refill Less than/equal to 3 seconds   Color Pink   Temperature Warm   LLE Neurovascular Assessment   Capillary Refill Less than/equal to 3 seconds   Color Pink   Temperature Warm   Skin Color/Condition   Skin Color/Condition (WDL) X   Skin Color Pale;Red;Francisco  (BLE)   Skin Condition/Temp Warm;Dry   Skin Integrity   Skin Integrity (WDL) X   Skin Integrity Redness   Location BLE   Assessed this shift? Yes   Skin Fold Management Yes   Dressing Site Abdominal pannus   Treatment Pharmaceutical   Date applied? 03/04/20   Assessed this shift Red;Moist   Multiple Skin Integrity Sites Yes   Skin Integrity Site 2   Skin Integrity Location 2 Redness   Location 2 btx   Musculoskeletal   Musculoskeletal (WDL) X   RUE Limited movement   LUE Limited movement   RL Extremity Limited movement;Weakness; Swelling   LL Extremity Limited movement;Weakness; Swelling   Genitourinary   Genitourinary (WDL) X   Flank Tenderness No   Suprapubic Tenderness No   Dysuria No   Urine Assessment   Incontinence   (f/c)   Urine Color Yellow/straw   Urine Appearance Clear   Wound 02/19/20 Heel Left skin intact black in color   Date First Assessed/Time First Assessed: 02/19/20 1530   Primary Wound Type: Pressure Injury  Location: Heel  Wound Location Orientation: Left  Wound Description (Comments): skin intact black in color   Offloading for Diabetic Foot Ulcers Offloading boot   Dressing Status Clean;Dry; Intact   Dressing/Treatment Foam   Dressing Change Due 03/06/20   Judith-wound Assessment Intact   Wound 02/20/20 Buttocks Right Stage II Red in color   Date First Assessed/Time First Assessed: 02/20/20 0824   Primary Wound Type: Pressure Injury  Location: Buttocks  Wound Location Orientation: Right  Wound Description (Comments): Stage II Red in color   Dressing Status Clean;Dry; Intact   Dressing/Treatment Other (comment)  (harshil)   Urethral Catheter   Placement Date/Time: 02/19/20 1840   Catheter Balloon Size: 10 mL  Collection Container: Standard  Securement Method: Securing device (Describe)  Urine Returned: (c) Yes   Catheter Indications Other (specify)   Site Assessment Urethral drainage   Urine Color Yellow   Urine Appearance Clear   Urine Odor Malodorous   Psychosocial   Psychosocial (WDL) WDL     No acute distress  Abdominal pannus red and moist   Nystatin applied   Request prn pain meds  Medications taken without difficulty

## 2020-03-06 PROCEDURE — 6360000002 HC RX W HCPCS: Performed by: PHYSICIAN ASSISTANT

## 2020-03-06 PROCEDURE — 97110 THERAPEUTIC EXERCISES: CPT

## 2020-03-06 PROCEDURE — 2580000003 HC RX 258: Performed by: PHYSICIAN ASSISTANT

## 2020-03-06 PROCEDURE — 99308 SBSQ NF CARE LOW MDM 20: CPT | Performed by: INTERNAL MEDICINE

## 2020-03-06 PROCEDURE — 6370000000 HC RX 637 (ALT 250 FOR IP): Performed by: PHYSICIAN ASSISTANT

## 2020-03-06 PROCEDURE — 1200000002 HC SEMI PRIVATE SWING BED

## 2020-03-06 RX ADMIN — CARVEDILOL 6.25 MG: 6.25 TABLET, FILM COATED ORAL at 16:22

## 2020-03-06 RX ADMIN — TRAMADOL HYDROCHLORIDE 50 MG: 50 TABLET, FILM COATED ORAL at 19:54

## 2020-03-06 RX ADMIN — NYSTATIN 500000 UNITS: 500000 SUSPENSION ORAL at 08:35

## 2020-03-06 RX ADMIN — FUROSEMIDE 40 MG: 40 TABLET ORAL at 08:35

## 2020-03-06 RX ADMIN — DICLOFENAC 2 G: 10 GEL TOPICAL at 08:35

## 2020-03-06 RX ADMIN — MICONAZOLE NITRATE: 20 POWDER TOPICAL at 11:07

## 2020-03-06 RX ADMIN — LEVOTHYROXINE SODIUM 112 MCG: 112 TABLET ORAL at 06:33

## 2020-03-06 RX ADMIN — POTASSIUM BICARBONATE 20 MEQ: 782 TABLET, EFFERVESCENT ORAL at 08:34

## 2020-03-06 RX ADMIN — Medication 10 ML: at 20:30

## 2020-03-06 RX ADMIN — SENNOSIDES AND DOCUSATE SODIUM 2 TABLET: 8.6; 5 TABLET ORAL at 08:35

## 2020-03-06 RX ADMIN — GABAPENTIN 300 MG: 300 CAPSULE ORAL at 19:53

## 2020-03-06 RX ADMIN — LOSARTAN POTASSIUM 100 MG: 50 TABLET, FILM COATED ORAL at 08:33

## 2020-03-06 RX ADMIN — Medication: at 19:54

## 2020-03-06 RX ADMIN — NYSTATIN 500000 UNITS: 500000 SUSPENSION ORAL at 11:07

## 2020-03-06 RX ADMIN — NYSTATIN 500000 UNITS: 500000 SUSPENSION ORAL at 16:22

## 2020-03-06 RX ADMIN — CARVEDILOL 6.25 MG: 6.25 TABLET, FILM COATED ORAL at 08:35

## 2020-03-06 RX ADMIN — DICLOFENAC 2 G: 10 GEL TOPICAL at 19:54

## 2020-03-06 RX ADMIN — PHENYTOIN SODIUM 500 MG: 100 CAPSULE ORAL at 19:53

## 2020-03-06 RX ADMIN — TRAMADOL HYDROCHLORIDE 50 MG: 50 TABLET, FILM COATED ORAL at 08:34

## 2020-03-06 RX ADMIN — ENOXAPARIN SODIUM 40 MG: 40 INJECTION SUBCUTANEOUS at 08:41

## 2020-03-06 RX ADMIN — NYSTATIN 500000 UNITS: 500000 SUSPENSION ORAL at 19:53

## 2020-03-06 RX ADMIN — CETIRIZINE HYDROCHLORIDE 5 MG: 10 TABLET, FILM COATED ORAL at 08:41

## 2020-03-06 RX ADMIN — FAMOTIDINE 40 MG: 20 TABLET, FILM COATED ORAL at 08:35

## 2020-03-06 RX ADMIN — MICONAZOLE NITRATE: 20 POWDER TOPICAL at 19:54

## 2020-03-06 RX ADMIN — GABAPENTIN 300 MG: 300 CAPSULE ORAL at 08:35

## 2020-03-06 ASSESSMENT — PAIN SCALES - GENERAL: PAINLEVEL_OUTOF10: 10

## 2020-03-06 NOTE — CARE COORDINATION
PT: patient currently working with OT; declined to get up to EOB or transfer.   Electronically signed by Delia Giordano PT on 3/6/2020 at 2:21 PM

## 2020-03-06 NOTE — PROGRESS NOTES
"As per [facility] correspondence: Previsit call-Hipaa- Mbr confirms his appt with Dr Mccormack on 7/14, states the last doctor he saw in May was Lorena Hebert and she was supposed to call in Cholesterol medication for him, states still the medication has not been called in,  states he got the Metformin but not cholesterol Med, he is upset about this because his chol panel is so high, Mbr wanted this relayed to the doctor's office to let them know, states he's called several times to get this matter taken care of and it has still not been called into the Pharmacy.     Patient Outreach: LPN, CCC attempted to contact patient to address his inquiry in regards to his cholesterol medication. VM option received, with brief message provided, which included LPN,CCC's name and contact information.  Upon review, On 05/24/2017, noted Fenofibrate 135MG prescription electronically submitted per Dr. Hebert to patient's default pharmacy on file. On 05/31/2017, Lopid 600MG  electronically submitted per Optometry, order #: [id number], tracking info as follows: "No order transmittal information available.   Order may not have completed transmittal."  LPN, CCC contacted [facility] as on file, and spoke with Katy, who reported last fill for Fenofibrate was on 06/18/2017. No fills for Lopid.              " Progress Note      Subjective:   Chief complaint:   Declining functional status    Interval History:   Patient lying in bed. She has intermittently refused therapy all week.  working to establish a hospital bed for home use. Patient refusing labs. Patient still plans to return home once hospital bed is arranged. Review of systems:     Constitutional:  Denies fever or chills. Positive for generalized weakness and fatigue. Eyes:  Denies change in visual acuity or discharge. HENT:  Denies nasal congestion or sore throat. Respiratory:  Denies cough or shortness of breath. Cardiovascular:  Denies chest pain, palpitation or swelling in LEs. GI:  Denies abdominal pain, nausea, vomiting, bloody stools or diarrhea. :  Denies dysuria or frequency. Musculoskeletal: Positive for back pain and knee pain. Integument:  Denies rash or itching. Neurologic:  Denies headache, focal weakness or sensory changes. Endocrine:  Denies polyuria or polydipsia. Lymphatic:  Denies swollen glands or night sweats. Psychiatric:  Denies depression or anxiety. Past medical history, surgical history, family history and social history reviewed and unchanged compared to H&P earlier this admission.     Medications:   Scheduled Meds:   sennosides-docusate sodium  2 tablet Oral Daily    enoxaparin  40 mg Subcutaneous Daily    sodium chloride flush  10 mL Intravenous 2 times per day    carvedilol  6.25 mg Oral BID WC    cetirizine  5 mg Oral Daily    diclofenac sodium  2 g Topical BID    famotidine  40 mg Oral Daily    furosemide  40 mg Oral Daily    gabapentin  300 mg Oral BID    levothyroxine  112 mcg Oral Daily    lidocaine  2 patch Transdermal Daily    losartan  100 mg Oral Daily    miconazole   Topical BID    nystatin  5 mL Oral 4x Daily    phenytoin  500 mg Oral Nightly    potassium bicarb-citric acid  20 mEq Oral Daily     Continuous Infusions:    Objective:     Vital Signs  Temp: 98.1 °F stenosis at L4/L5. Case discussed with neurosurgery, Dr. Kyrie Tate, at [facility] on 2/15. Dr. Kyrie Tate recommended outpatient MRI and outpatient neurosurgery follow-up. Patient was declined by [facility] internal medicine service for transfer (family requested transfer) because she was not appropriate for acute care/transfer. Will arrange outpatient follow-up upon discharge. 02/15/2020    Low back pain [M54.5]  PRN Tylenol   02/14/2020    Anemia [D64.9]  Hemoglobin stable with last lab work. Iron, B12, folate within normal limits. Patient refusing labs this week. She may benefit from screening upper and lower endoscopies as outpatient. Defer to PCP.   02/14/2020    Declining functional status [R53.81]  Patient has not walked independently in years. She spends most of her time in a recliner prior to admission however her recliner recently broke and she cannot afford a new one. She was found to have an acute UTI upon arrival.  This was treated with no improvement in functional status. There was no evidence of reversible causes as magnesium, B12, folate and TSH were within normal limits. Patient is morbidly obese with BMI 58.9. She also has chronic lymphedema that complicates her care. She has refused PT and OT intermittently while inpatient. Family initially refused to take patient home. APS became involved and now family agreeable to take patient home.  working to arrange hospital bed for home use. Plan to discharge once bed is available. 02/13/2020    Lymphedema of both lower extremities [I89.0]  Bilateral lower extremity venous duplex negative for DVT. Continue home Lasix. Continue home gabapentin for neuropathic pain. 02/13/2020    Morbid obesity ([address] [E66.01]  BMI 60. Complicates all aspects of care. Counseled on diet and exercise after recovery. 02/13/2020    Seizure disorder ([address] [G40.909]  No seizures while admitted. Continue home seizure regimen.    02/13/2020    Wound of right buttock [S37.590Y]  Encourage offloading. Stage II. Continue wound care. 02/13/2020    UTI (urinary tract infection) [N39.0]  Completed antibiotic course. 02/12/2020     Patient was seen and examined by Dr. Edward Barone and plan of care reviewed.       Electronically signed by GARO Khan on 3/6/2020 at 3:32 PM

## 2020-03-06 NOTE — PLAN OF CARE
Problem: Respiratory  Goal: O2 Sat > 90%  Outcome: Ongoing     Problem:   Goal: Adequate urinary output  Outcome: Ongoing     Problem: Nutrition  Goal: Optimal nutrition therapy  Outcome: Ongoing     Problem: Skin Integrity/Risk  Goal: Wound healing  Outcome: Ongoing

## 2020-03-06 NOTE — PROGRESS NOTES
Occupational Therapy  Facility/Department: [facility] MED SURG  Daily Treatment Note  NAME: Luh Hooper  : 1950  MRN: [id number]    Date of Service: 3/6/2020    Discharge Recommendations:  Continue to assess pending progress       Assessment   Assessment: Pt agreeable to bed ther ex only on this date. Pt declined to attempt to sit up at EOB on this date. Pt tolerated BUE ther ex without difficulty. Pt completed shoulder AROM and forearm and wrist ther ex using 2# weight tolerating well. Patient Diagnosis(es): There were no encounter diagnoses. has a past medical history of Hypertension, Orthopnea, Seizures ([address] and Thyroid disease. has a past surgical history that includes Cholecystectomy. Restrictions  Restrictions/Precautions  Restrictions/Precautions: Fall Risk, General Precautions  Required Braces or Orthoses?: No  Subjective   General  Chart Reviewed: Yes  Patient assessed for rehabilitation services?: Yes  Family / Caregiver Present: No  Referring Practitioner: GARO Boyce  Diagnosis: Generalized weakness, UTI,   Subjective  Subjective: Pt reports pain in BLE feet and back. Agreeable to attempt therapy services on this date.        Orientation     Objective       Type of ROM/Therapeutic Exercise  Type of ROM/Therapeutic Exercise: AROM  Exercises  Scapular Protraction: x15  Scapular Retraction: x15  Shoulder Elevation: x15  Shoulder Flexion: x15  Shoulder ABduction: x15  Elbow Flexion: x15  Elbow Extension: x15  Supination: x15  Pronation: x15  Wrist Flexion: x15  Wrist Extension: x15                    Plan   Plan  Times per week: 3-5  Times per day: Daily  Plan weeks: 1  Current Treatment Recommendations: Strengthening, Endurance Training, Functional Mobility Training, Positioning, Safety Education & Training, Self-Care / ADL, Patient/Caregiver Education & Training       Goals  Short term goals  Time Frame for Short term goals: 1 week  Short term goal 1: Pt to complete bed mobility with MOD I. Short term goal 2: Pt to complete toilet transfer with mod assist using RW. Short term goal 3: Pt to complete toileting with MOD A. Short term goal 4: Pt to complete dressing with MOD assist.   Short term goal 5: pt to tolerate x15 minutes of activity to increase functional activity tolerance. Long term goals  Time Frame for Long term goals : 2 week  Long term goal 1: Pt to complete toileting with CGA. Long term goal 2: Pt to complete dressing with CGA. Long term goal 3: Pt to complete toileting with MIN A. Therapy Time   Individual Concurrent Group Co-treatment   Time In 1034         Time Out [id number]         Minutes 33               This note serves as a DC summary in the event of pt discharge.    Palmira Restrepo, OTR/L

## 2020-03-06 NOTE — FLOWSHEET NOTE
03/06/20 0955   Assessment   Charting Type Shift assessment   Neurological   Neuro (WDL) WDL   Level of Consciousness 0   [place] Coma Scale   Eye Opening 4   Best Verbal Response 5   Best Motor Response 6   Huy Coma Scale Score 15   NIH/MNHISS Stroke Scale   NIH/MNIHSS Stroke Scale Assessed No   HEENT   HEENT (WDL) X   Right Eye Impaired vision   Left Eye Impaired vision   Tongue Coated   Voice Normal   Mucous Membrane Pink;Moist   Teeth Missing teeth   Respiratory   Respiratory (WDL) WDL   Respiratory Pattern Regular   Respiratory Depth Normal   Respiratory Quality/Effort Unlabored   Chest Assessment Chest expansion symmetrical;Trachea midline   L Breath Sounds Clear;Diminished   R Breath Sounds Clear;Diminished   Breath Sounds   Right Upper Lobe Clear   Right Middle Lobe Diminished   Right Lower Lobe Diminished   Left Upper Lobe Clear   Left Lower Lobe Diminished   Cough/Sputum   Cough Congested   Sputum Amount None   Incentive Spirometry Tx   Respiratory Effort Dyspnea with Exertion   Cardiac   Cardiac (WDL) WDL   Cardiac Monitor   Telemetry Monitor On No   Gastrointestinal   Abdominal (WDL) X   RUQ Bowel Sounds Active   LUQ Bowel Sounds Active   RLQ Bowel Sounds Active   LLQ Bowel Sounds Active   Abdomen Inspection Rotund;Rounded   Tenderness Soft;Nontender   Peripheral Vascular   Peripheral Vascular (WDL) X   Edema Right lower extremity; Left lower extremity;Generalized   Edema Generalized Non-pitting   RLE Edema Non-pitting   LLE Edema Non-pitting   RUE Neurovascular Assessment   Capillary Refill Less than/equal to 3 seconds   Color Pink   Temperature Warm   LUE Neurovascular Assessment   Capillary Refill Less than/equal to 3 seconds   Color Pink   Temperature Warm   RLE Neurovascular Assessment   Capillary Refill Less than/equal to 3 seconds   Color Pink   Temperature Warm   LLE Neurovascular Assessment   Capillary Refill Less than/equal to 3 seconds   Color Pink   Temperature Warm   Skin Color/Condition   Skin Color/Condition (WDL) X   Skin Color Pale;Red;Francisco   Skin Condition/Temp Warm;Dry   Skin Integrity   Skin Integrity (WDL) X   Skin Integrity Redness   Location BLE   Preventative Dressing No   Assessed this shift? Yes   Skin Integrity Site 2   Skin Integrity Location 2 Redness   Location 2 btx   Musculoskeletal   Musculoskeletal (WDL) X   RUE Limited movement   LUE Limited movement   RL Extremity Limited movement;Weakness; Swelling   LL Extremity Limited movement;Weakness; Swelling   Genitourinary   Genitourinary (WDL) X   Flank Tenderness No   Suprapubic Tenderness No   Dysuria No   Urine Assessment   Incontinence   (f/c)   Urine Color Yellow/straw   Urine Appearance Clear   Wound 02/19/20 Heel Left skin intact black in color   Date First Assessed/Time First Assessed: 02/19/20 1530   Primary Wound Type: Pressure Injury  Location: Heel  Wound Location Orientation: Left  Wound Description (Comments): skin intact black in color   Dressing Status Clean;Dry; Intact   Dressing/Treatment Foam   Dressing Change Due 03/09/20   Judith-wound Assessment Intact   Wound 02/20/20 Buttocks Right Stage II Red in color   Date First Assessed/Time First Assessed: 02/20/20 0824   Primary Wound Type: Pressure Injury  Location: Buttocks  Wound Location Orientation: Right  Wound Description (Comments): Stage II Red in color   Dressing Status Clean;Dry; Intact   Dressing/Treatment Other (comment)  (harshil)   Urethral Catheter   Placement Date/Time: 02/19/20 1840   Catheter Balloon Size: 10 mL  Collection Container: Standard  Securement Method: Securing device (Describe)  Urine Returned: (c) Yes   Catheter Indications Other (specify)   Site Assessment Urethral drainage   Urine Color Yellow   Urine Appearance Clear   Urine Odor Malodorous   Psychosocial   Psychosocial (WDL) WDL   Pt awake in bed. Pt alert and oriented. Pt appears in no acute distress. Pt currently on RA.  Pt lung sounds clear with diminished sounds in bases fariba. Pt encouraged to cough and deep breathe. Pt has redness on BLE. Pt casas cath intact. Pt has non pitting edema in BLE. Pt call bell and bedside table within reach. Will continue to monitor pt.

## 2020-03-07 LAB
A/G RATIO: 0.6 (ref 0.8–2)
ALBUMIN SERPL-MCNC: 2.5 G/DL (ref 3.4–4.8)
ALP BLD-CCNC: 83 U/L (ref 25–100)
ALT SERPL-CCNC: 16 U/L (ref 4–36)
ANION GAP SERPL CALCULATED.3IONS-SCNC: 11 MMOL/L (ref 3–16)
AST SERPL-CCNC: 29 U/L (ref 8–33)
BILIRUB SERPL-MCNC: <0.2 MG/DL (ref 0.3–1.2)
BUN BLDV-MCNC: 26 MG/DL (ref 6–20)
CALCIUM SERPL-MCNC: 8.2 MG/DL (ref 8.5–10.5)
CHLORIDE BLD-SCNC: 100 MMOL/L (ref 98–107)
CO2: 22 MMOL/L (ref 20–30)
CREAT SERPL-MCNC: 0.8 MG/DL (ref 0.4–1.2)
GFR AFRICAN AMERICAN: >59
GFR NON-AFRICAN AMERICAN: >60
GLOBULIN: 4.5 G/DL
GLUCOSE BLD-MCNC: 128 MG/DL (ref 74–106)
HCT VFR BLD CALC: 39.2 % (ref 37–47)
HEMOGLOBIN: 12 G/DL (ref 11.5–16.5)
MCH RBC QN AUTO: 30 PG (ref 27–32)
MCHC RBC AUTO-ENTMCNC: 30.6 G/DL (ref 31–35)
MCV RBC AUTO: 98 FL (ref 80–100)
PDW BLD-RTO: 16 % (ref 11–16)
PLATELET # BLD: 205 K/UL (ref 150–400)
PMV BLD AUTO: 8.9 FL (ref 6–10)
POTASSIUM SERPL-SCNC: 4.6 MMOL/L (ref 3.4–5.1)
RBC # BLD: 4 M/UL (ref 3.8–5.8)
SODIUM BLD-SCNC: 133 MMOL/L (ref 136–145)
TOTAL PROTEIN: 7 G/DL (ref 6.4–8.3)
WBC # BLD: 7.6 K/UL (ref 4–11)

## 2020-03-07 PROCEDURE — 85027 COMPLETE CBC AUTOMATED: CPT

## 2020-03-07 PROCEDURE — 1200000002 HC SEMI PRIVATE SWING BED

## 2020-03-07 PROCEDURE — 80053 COMPREHEN METABOLIC PANEL: CPT

## 2020-03-07 PROCEDURE — 36415 COLL VENOUS BLD VENIPUNCTURE: CPT

## 2020-03-07 PROCEDURE — 6360000002 HC RX W HCPCS: Performed by: PHYSICIAN ASSISTANT

## 2020-03-07 PROCEDURE — 6370000000 HC RX 637 (ALT 250 FOR IP): Performed by: PHYSICIAN ASSISTANT

## 2020-03-07 RX ADMIN — GABAPENTIN 300 MG: 300 CAPSULE ORAL at 07:57

## 2020-03-07 RX ADMIN — CARVEDILOL 6.25 MG: 6.25 TABLET, FILM COATED ORAL at 07:57

## 2020-03-07 RX ADMIN — GABAPENTIN 300 MG: 300 CAPSULE ORAL at 21:16

## 2020-03-07 RX ADMIN — NYSTATIN 500000 UNITS: 500000 SUSPENSION ORAL at 21:17

## 2020-03-07 RX ADMIN — SENNOSIDES AND DOCUSATE SODIUM 2 TABLET: 8.6; 5 TABLET ORAL at 07:58

## 2020-03-07 RX ADMIN — FAMOTIDINE 40 MG: 20 TABLET, FILM COATED ORAL at 07:57

## 2020-03-07 RX ADMIN — DICLOFENAC 2 G: 10 GEL TOPICAL at 21:16

## 2020-03-07 RX ADMIN — FUROSEMIDE 40 MG: 40 TABLET ORAL at 07:58

## 2020-03-07 RX ADMIN — DICLOFENAC 2 G: 10 GEL TOPICAL at 07:58

## 2020-03-07 RX ADMIN — CARVEDILOL 6.25 MG: 6.25 TABLET, FILM COATED ORAL at 18:06

## 2020-03-07 RX ADMIN — TRAMADOL HYDROCHLORIDE 50 MG: 50 TABLET, FILM COATED ORAL at 07:58

## 2020-03-07 RX ADMIN — MICONAZOLE NITRATE: 20 POWDER TOPICAL at 21:18

## 2020-03-07 RX ADMIN — CETIRIZINE HYDROCHLORIDE 5 MG: 10 TABLET, FILM COATED ORAL at 07:57

## 2020-03-07 RX ADMIN — LEVOTHYROXINE SODIUM 112 MCG: 112 TABLET ORAL at 06:27

## 2020-03-07 RX ADMIN — NYSTATIN 500000 UNITS: 500000 SUSPENSION ORAL at 18:06

## 2020-03-07 RX ADMIN — NYSTATIN 500000 UNITS: 500000 SUSPENSION ORAL at 07:57

## 2020-03-07 RX ADMIN — ENOXAPARIN SODIUM 40 MG: 40 INJECTION SUBCUTANEOUS at 07:57

## 2020-03-07 RX ADMIN — TRAMADOL HYDROCHLORIDE 50 MG: 50 TABLET, FILM COATED ORAL at 22:51

## 2020-03-07 RX ADMIN — POTASSIUM BICARBONATE 20 MEQ: 782 TABLET, EFFERVESCENT ORAL at 07:58

## 2020-03-07 RX ADMIN — PHENYTOIN SODIUM 500 MG: 100 CAPSULE ORAL at 21:16

## 2020-03-07 RX ADMIN — LOSARTAN POTASSIUM 100 MG: 50 TABLET, FILM COATED ORAL at 07:57

## 2020-03-07 RX ADMIN — MICONAZOLE NITRATE: 20 POWDER TOPICAL at 08:04

## 2020-03-07 ASSESSMENT — PAIN SCALES - GENERAL
PAINLEVEL_OUTOF10: 10
PAINLEVEL_OUTOF10: 8

## 2020-03-08 PROCEDURE — 6360000002 HC RX W HCPCS: Performed by: PHYSICIAN ASSISTANT

## 2020-03-08 PROCEDURE — 6370000000 HC RX 637 (ALT 250 FOR IP): Performed by: PHYSICIAN ASSISTANT

## 2020-03-08 PROCEDURE — 1200000002 HC SEMI PRIVATE SWING BED

## 2020-03-08 RX ADMIN — CARVEDILOL 6.25 MG: 6.25 TABLET, FILM COATED ORAL at 17:41

## 2020-03-08 RX ADMIN — DICLOFENAC 2 G: 10 GEL TOPICAL at 20:14

## 2020-03-08 RX ADMIN — MICONAZOLE NITRATE: 20 POWDER TOPICAL at 08:27

## 2020-03-08 RX ADMIN — GABAPENTIN 300 MG: 300 CAPSULE ORAL at 08:25

## 2020-03-08 RX ADMIN — TRAMADOL HYDROCHLORIDE 50 MG: 50 TABLET, FILM COATED ORAL at 20:14

## 2020-03-08 RX ADMIN — CETIRIZINE HYDROCHLORIDE 5 MG: 10 TABLET, FILM COATED ORAL at 08:25

## 2020-03-08 RX ADMIN — LEVOTHYROXINE SODIUM 112 MCG: 112 TABLET ORAL at 05:48

## 2020-03-08 RX ADMIN — DICLOFENAC 2 G: 10 GEL TOPICAL at 08:27

## 2020-03-08 RX ADMIN — MICONAZOLE NITRATE: 20 POWDER TOPICAL at 20:16

## 2020-03-08 RX ADMIN — LOSARTAN POTASSIUM 100 MG: 50 TABLET, FILM COATED ORAL at 08:25

## 2020-03-08 RX ADMIN — FAMOTIDINE 40 MG: 20 TABLET, FILM COATED ORAL at 08:25

## 2020-03-08 RX ADMIN — PHENYTOIN SODIUM 500 MG: 100 CAPSULE ORAL at 20:14

## 2020-03-08 RX ADMIN — Medication: at 08:28

## 2020-03-08 RX ADMIN — GABAPENTIN 300 MG: 300 CAPSULE ORAL at 20:14

## 2020-03-08 RX ADMIN — ENOXAPARIN SODIUM 40 MG: 40 INJECTION SUBCUTANEOUS at 08:26

## 2020-03-08 RX ADMIN — Medication: at 20:21

## 2020-03-08 RX ADMIN — NYSTATIN 500000 UNITS: 500000 SUSPENSION ORAL at 08:25

## 2020-03-08 RX ADMIN — CARVEDILOL 6.25 MG: 6.25 TABLET, FILM COATED ORAL at 08:26

## 2020-03-08 RX ADMIN — TRAMADOL HYDROCHLORIDE 50 MG: 50 TABLET, FILM COATED ORAL at 08:26

## 2020-03-08 RX ADMIN — FUROSEMIDE 40 MG: 40 TABLET ORAL at 08:26

## 2020-03-08 RX ADMIN — SENNOSIDES AND DOCUSATE SODIUM 2 TABLET: 8.6; 5 TABLET ORAL at 08:26

## 2020-03-08 RX ADMIN — POTASSIUM BICARBONATE 20 MEQ: 782 TABLET, EFFERVESCENT ORAL at 08:26

## 2020-03-08 ASSESSMENT — PAIN SCALES - GENERAL
PAINLEVEL_OUTOF10: 10
PAINLEVEL_OUTOF10: 10

## 2020-03-08 NOTE — FLOWSHEET NOTE
03/08/20 1029   Neurological   Neuro (WDL) WDL   Level of Consciousness 0   HEENT   HEENT (WDL) X   Right Eye Impaired vision   Left Eye Impaired vision   Tongue Coated   Voice Normal   Mucous Membrane Pink;Moist   Teeth Missing teeth   Respiratory   Respiratory (WDL) WDL   Respiratory Pattern Regular   Respiratory Depth Normal   Respiratory Quality/Effort Unlabored   Chest Assessment Chest expansion symmetrical;Trachea midline   L Breath Sounds Clear;Diminished   R Breath Sounds Clear;Diminished   Breath Sounds   Right Upper Lobe Clear   Right Middle Lobe Diminished   Right Lower Lobe Diminished   Left Upper Lobe Clear   Left Lower Lobe Diminished   Cough/Sputum   Cough Congested   Sputum Amount None   Incentive Spirometry Tx   Respiratory Effort Dyspnea with Exertion   Incentive Spirometry Goal (mL) 3000 mL   Incentive Spirometry Achieved (mL) 1500 mL   Cardiac   Cardiac (WDL) WDL   Cardiac Monitor   Telemetry Monitor On No   Gastrointestinal   Abdominal (WDL) X   RUQ Bowel Sounds Active   LUQ Bowel Sounds Active   RLQ Bowel Sounds Active   LLQ Bowel Sounds Active   Abdomen Inspection Rotund;Rounded   Tenderness Soft;Nontender   Peripheral Vascular   Peripheral Vascular (WDL) X   Edema Right lower extremity; Left lower extremity;Generalized   Edema Generalized Non-pitting   RLE Edema Non-pitting   LLE Edema Non-pitting   RUE Neurovascular Assessment   Capillary Refill Less than/equal to 3 seconds   Color Pink   Temperature Warm   LUE Neurovascular Assessment   Capillary Refill Less than/equal to 3 seconds   Color Pink   Temperature Warm   RLE Neurovascular Assessment   Capillary Refill Less than/equal to 3 seconds   Color Pink   Temperature Warm   LLE Neurovascular Assessment   Capillary Refill Less than/equal to 3 seconds   Color Pink   Temperature Warm   Skin Color/Condition   Skin Color/Condition (WDL) X   Skin Color Pale;Red;Francisco   Skin Condition/Temp Dry; Warm   Skin Integrity   Skin Integrity (WDL) X

## 2020-03-09 PROCEDURE — 97803 MED NUTRITION INDIV SUBSEQ: CPT

## 2020-03-09 PROCEDURE — 1200000002 HC SEMI PRIVATE SWING BED

## 2020-03-09 PROCEDURE — 6360000002 HC RX W HCPCS: Performed by: PHYSICIAN ASSISTANT

## 2020-03-09 PROCEDURE — 6370000000 HC RX 637 (ALT 250 FOR IP): Performed by: PHYSICIAN ASSISTANT

## 2020-03-09 PROCEDURE — 2580000003 HC RX 258: Performed by: PHYSICIAN ASSISTANT

## 2020-03-09 RX ADMIN — DICLOFENAC 2 G: 10 GEL TOPICAL at 09:36

## 2020-03-09 RX ADMIN — LEVOTHYROXINE SODIUM 112 MCG: 112 TABLET ORAL at 05:29

## 2020-03-09 RX ADMIN — POTASSIUM BICARBONATE 20 MEQ: 782 TABLET, EFFERVESCENT ORAL at 09:35

## 2020-03-09 RX ADMIN — NYSTATIN 500000 UNITS: 500000 SUSPENSION ORAL at 09:35

## 2020-03-09 RX ADMIN — FAMOTIDINE 40 MG: 20 TABLET, FILM COATED ORAL at 09:36

## 2020-03-09 RX ADMIN — MICONAZOLE NITRATE: 20 POWDER TOPICAL at 09:37

## 2020-03-09 RX ADMIN — GABAPENTIN 300 MG: 300 CAPSULE ORAL at 21:02

## 2020-03-09 RX ADMIN — GABAPENTIN 300 MG: 300 CAPSULE ORAL at 09:35

## 2020-03-09 RX ADMIN — MICONAZOLE NITRATE: 20 POWDER TOPICAL at 21:05

## 2020-03-09 RX ADMIN — ENOXAPARIN SODIUM 40 MG: 40 INJECTION SUBCUTANEOUS at 09:35

## 2020-03-09 RX ADMIN — FUROSEMIDE 40 MG: 40 TABLET ORAL at 09:35

## 2020-03-09 RX ADMIN — LOSARTAN POTASSIUM 100 MG: 50 TABLET, FILM COATED ORAL at 09:37

## 2020-03-09 RX ADMIN — PHENYTOIN SODIUM 500 MG: 100 CAPSULE ORAL at 21:01

## 2020-03-09 RX ADMIN — NYSTATIN 500000 UNITS: 500000 SUSPENSION ORAL at 21:06

## 2020-03-09 RX ADMIN — Medication: at 09:37

## 2020-03-09 RX ADMIN — DICLOFENAC 2 G: 10 GEL TOPICAL at 21:01

## 2020-03-09 RX ADMIN — CARVEDILOL 6.25 MG: 6.25 TABLET, FILM COATED ORAL at 09:36

## 2020-03-09 RX ADMIN — CARVEDILOL 6.25 MG: 6.25 TABLET, FILM COATED ORAL at 16:30

## 2020-03-09 RX ADMIN — CETIRIZINE HYDROCHLORIDE 5 MG: 10 TABLET, FILM COATED ORAL at 09:37

## 2020-03-09 RX ADMIN — NYSTATIN 500000 UNITS: 500000 SUSPENSION ORAL at 16:30

## 2020-03-09 ASSESSMENT — PAIN SCALES - GENERAL: PAINLEVEL_OUTOF10: 0

## 2020-03-09 NOTE — PROGRESS NOTES
MEDICAL NUTRITION THERAPY - FOLLOW UP  Subjective input: Pt continues to have increased protein needs r/t wound healing. Pt continues to lose weight, but planned and related to healthy diet and adequate calories but not excessive and on diuretics. Intake/Output Summary (Last 24 hours) at 3/9/2020 1321  Last data filed at 3/9/2020 1233  Gross per 24 hour   Intake 474 ml   Output 1850 ml   Net -1376 ml       Recent Labs     03/07/20  1345   WBC 7.6   HGB 12.0   HCT 39.2   *   K 4.6      CO2 22   BUN 26*   CREATININE 0.8   GLUCOSE 128*   ALT 16   BILITOT <0.2*   ALKPHOS 83        No results for input(s): POCGLU in the last 72 hours. Patient Vitals for the past 96 hrs (Last 3 readings):   Weight   03/09/20 0519 (!) 363 lb 4 oz (164.8 kg)   03/08/20 0525 (!) 366 lb 2 oz (166.1 kg)   03/07/20 0603 (!) 366 lb 2 oz (166.1 kg)           Goal: To improve skin integrity via increased protein, adequate calories intake, to help improve weight status with healthier choices and non-excessive calorie intakes.

## 2020-03-09 NOTE — CARE COORDINATION
Pt refusing PT treatment early afternoon due to reports of nausea. Plan to attempt again tomorrow. PT re-iterated with pt the importance of participation with rehab services to improve functional mobility.     Electronically signed by Vickey Ross PT on 8/4/4916 at 5:09 PM

## 2020-03-09 NOTE — PLAN OF CARE
Problem: Respiratory  Goal: No pulmonary complications  Outcome: Ongoing     Problem: Daily Care:  Goal: Daily care needs are met  Description: Daily care needs are met  Outcome: Ongoing

## 2020-03-09 NOTE — CARE COORDINATION
Attempted to provide skilled OT interventions this date. Pt reports feeling unwell & refused to participated in services.  Will attempt to provide services again 3/10/20    Nestor Stack OTR/L

## 2020-03-10 PROCEDURE — 6370000000 HC RX 637 (ALT 250 FOR IP): Performed by: PHYSICIAN ASSISTANT

## 2020-03-10 PROCEDURE — 97530 THERAPEUTIC ACTIVITIES: CPT

## 2020-03-10 PROCEDURE — 2500000003 HC RX 250 WO HCPCS: Performed by: PHYSICIAN ASSISTANT

## 2020-03-10 PROCEDURE — 6360000002 HC RX W HCPCS: Performed by: PHYSICIAN ASSISTANT

## 2020-03-10 PROCEDURE — 1200000002 HC SEMI PRIVATE SWING BED

## 2020-03-10 PROCEDURE — 97110 THERAPEUTIC EXERCISES: CPT

## 2020-03-10 RX ADMIN — ACETAMINOPHEN 650 MG: 325 TABLET, FILM COATED ORAL at 23:28

## 2020-03-10 RX ADMIN — LEVOTHYROXINE SODIUM 112 MCG: 112 TABLET ORAL at 06:15

## 2020-03-10 RX ADMIN — TRAMADOL HYDROCHLORIDE 50 MG: 50 TABLET, FILM COATED ORAL at 20:57

## 2020-03-10 RX ADMIN — NYSTATIN 500000 UNITS: 500000 SUSPENSION ORAL at 08:21

## 2020-03-10 RX ADMIN — SENNOSIDES AND DOCUSATE SODIUM 2 TABLET: 8.6; 5 TABLET ORAL at 08:22

## 2020-03-10 RX ADMIN — ENOXAPARIN SODIUM 40 MG: 40 INJECTION SUBCUTANEOUS at 08:21

## 2020-03-10 RX ADMIN — CARVEDILOL 6.25 MG: 6.25 TABLET, FILM COATED ORAL at 08:23

## 2020-03-10 RX ADMIN — DICLOFENAC 2 G: 10 GEL TOPICAL at 20:52

## 2020-03-10 RX ADMIN — MICONAZOLE NITRATE: 20 POWDER TOPICAL at 08:24

## 2020-03-10 RX ADMIN — NYSTATIN 500000 UNITS: 500000 SUSPENSION ORAL at 20:52

## 2020-03-10 RX ADMIN — LOSARTAN POTASSIUM 100 MG: 50 TABLET, FILM COATED ORAL at 08:22

## 2020-03-10 RX ADMIN — NYSTATIN 500000 UNITS: 500000 SUSPENSION ORAL at 13:13

## 2020-03-10 RX ADMIN — POTASSIUM BICARBONATE 20 MEQ: 782 TABLET, EFFERVESCENT ORAL at 08:21

## 2020-03-10 RX ADMIN — PHENYTOIN SODIUM 500 MG: 100 CAPSULE ORAL at 20:52

## 2020-03-10 RX ADMIN — CARVEDILOL 6.25 MG: 6.25 TABLET, FILM COATED ORAL at 17:42

## 2020-03-10 RX ADMIN — GABAPENTIN 300 MG: 300 CAPSULE ORAL at 20:52

## 2020-03-10 RX ADMIN — TRAMADOL HYDROCHLORIDE 50 MG: 50 TABLET, FILM COATED ORAL at 08:21

## 2020-03-10 RX ADMIN — GABAPENTIN 300 MG: 300 CAPSULE ORAL at 08:22

## 2020-03-10 RX ADMIN — CETIRIZINE HYDROCHLORIDE 5 MG: 10 TABLET, FILM COATED ORAL at 08:23

## 2020-03-10 RX ADMIN — FAMOTIDINE 40 MG: 20 TABLET, FILM COATED ORAL at 08:22

## 2020-03-10 RX ADMIN — DICLOFENAC 2 G: 10 GEL TOPICAL at 08:23

## 2020-03-10 RX ADMIN — NYSTATIN 500000 UNITS: 500000 SUSPENSION ORAL at 17:42

## 2020-03-10 RX ADMIN — FUROSEMIDE 40 MG: 40 TABLET ORAL at 08:23

## 2020-03-10 RX ADMIN — MICONAZOLE NITRATE: 20 POWDER TOPICAL at 20:57

## 2020-03-10 ASSESSMENT — PAIN SCALES - GENERAL
PAINLEVEL_OUTOF10: 10
PAINLEVEL_OUTOF10: 0
PAINLEVEL_OUTOF10: 7
PAINLEVEL_OUTOF10: 10
PAINLEVEL_OUTOF10: 0

## 2020-03-10 NOTE — FLOWSHEET NOTE
Pt laying in bed, Alert and oriented x3. No acute distress noted at this time. No change from previous assessment. POC for the day reviewed, Pt denies questions. Pt denies any Needs at this time. Bed in lowest, locked position, call light with in reach. Will continue to monitor. 03/09/20 2120   Assessment   Charting Type Shift assessment   Neurological   Neuro (WDL) WDL   Level of Consciousness 0   Swallow Screening   Is the patient able to remain alert for testing? Yes   Was the Patient Eating a Modified Diet Prior to being Admitted? No   Is there an Existing PEG or Abdominal Feeding Tube? No   Does the patient have a Head of Bed [facility] restriction <30°? No   Is there a Tracheostomy Tube Present?  No   [place] Coma Scale   Eye Opening 4   Best Verbal Response 5   Best Motor Response 6   Huy Coma Scale Score 15   NIH/MNHISS Stroke Scale   NIH/MNIHSS Stroke Scale Assessed No   HEENT   HEENT (WDL) X   Right Eye Impaired vision   Left Eye Impaired vision   Tongue Coated   Voice Normal   Mucous Membrane Pink;Moist   Teeth Missing teeth   Respiratory   Respiratory (WDL) WDL   Respiratory Pattern Regular   Respiratory Depth Normal   Respiratory Quality/Effort Unlabored;Dyspnea with exertion   Chest Assessment Chest expansion symmetrical;Trachea midline   L Breath Sounds Clear;Diminished   R Breath Sounds Clear;Diminished   Breath Sounds   Right Upper Lobe Clear   Right Middle Lobe Diminished   Right Lower Lobe Diminished   Left Upper Lobe Clear   Left Lower Lobe Diminished   Incentive Spirometry Tx   Respiratory Effort Dyspnea with Exertion   Cough and Deep Breathe   Cough and Deep Breathe Yes   Cardiac   Cardiac (WDL) WDL   Cardiac Monitor   Telemetry Monitor On No   Gastrointestinal   Abdominal (WDL) WDL   RUQ Bowel Sounds Active   LUQ Bowel Sounds Active   RLQ Bowel Sounds Active   LLQ Bowel Sounds Active   Abdomen Inspection Rounded   Tenderness Soft;Nontender   Peripheral Vascular   Peripheral Vascular (WDL)

## 2020-03-10 NOTE — PLAN OF CARE
Problem: Pain Control  Goal: Maintain pain level at or below patient's acceptable level (or 5 if patient is unable to determine acceptable level)  Outcome: Ongoing  Goal: Improvement in pain related behaviors BP/HR WNL  Outcome: Ongoing     Problem: Respiratory  Goal: No pulmonary complications  Outcome: Ongoing  Goal: O2 Sat > 90%  Outcome: Ongoing  Goal: Supplemental O2 requirements decreased  Outcome: Ongoing  Goal: Agreement to quit smoking  Outcome: Ongoing  Goal: Pneumonia vaccine at discharge as needed  Outcome: Ongoing     Problem:   Goal: Adequate urinary output  Outcome: Ongoing  Goal: No urinary complication  Outcome: Ongoing     Problem: Nutrition  Goal: Optimal nutrition therapy  Outcome: Ongoing  Goal: Understanding of nutritional guidelines  Outcome: Ongoing     Problem: Skin Integrity/Risk  Goal: No skin breakdown during hospitalization  Outcome: Ongoing  Goal: Wound healing  Outcome: Ongoing     Problem: Falls - Risk of:  Goal: Will remain free from falls  Description: Will remain free from falls  Outcome: Ongoing  Goal: Absence of physical injury  Description: Absence of physical injury  Outcome: Ongoing     Problem: Pain:  Goal: Pain level will decrease  Description: Pain level will decrease  Outcome: Ongoing  Goal: Control of acute pain  Description: Control of acute pain  Outcome: Ongoing  Goal: Control of chronic pain  Description: Control of chronic pain  Outcome: Ongoing  Goal: Patient's pain/discomfort is manageable  Description: Patient's pain/discomfort is manageable  Outcome: Ongoing     Problem: Infection:  Goal: Will remain free from infection  Description: Will remain free from infection  Outcome: Ongoing     Problem: Safety:  Goal: Free from accidental physical injury  Description: Free from accidental physical injury  Outcome: Ongoing  Goal: Free from intentional harm  Description: Free from intentional harm  Outcome: Ongoing     Problem: Daily Care:  Goal: Daily care needs are

## 2020-03-10 NOTE — PROGRESS NOTES
Yes  Grasp/Release: Yes     Plan   Plan  Times per week: 3-5  Times per day: Daily  Plan weeks: 1  Current Treatment Recommendations: Strengthening, Endurance Training, Functional Mobility Training, Positioning, Safety Education & Training, Self-Care / ADL, Patient/Caregiver Education & Training    Goals  Short term goals  Time Frame for Short term goals: 1 week  Short term goal 1: Pt to complete bed mobility with MOD I. Short term goal 2: Pt to complete toilet transfer with mod assist using RW. Short term goal 3: Pt to complete toileting with MOD A. Short term goal 4: Pt to complete dressing with MOD assist.   Short term goal 5: pt to tolerate x15 minutes of activity to increase functional activity tolerance. Long term goals  Time Frame for Long term goals : 2 week  Long term goal 1: Pt to complete toileting with CGA. Long term goal 2: Pt to complete dressing with CGA. Long term goal 3: Pt to complete toileting with MIN A. Therapy Time   Individual Concurrent Group Co-treatment   Time In 0107         Time Out 0124         Minutes 17               This note serves as D/C summary if patient is discharged prior to next visit.     Chintan Peralta, OTR/L

## 2020-03-10 NOTE — PROGRESS NOTES
Physical Therapy  Facility/Department: [facility] MED SURG  Daily Treatment Note  NAME: Jerman Beard  : 1950  MRN: [id number]    Date of Service: 3/10/2020    Discharge Recommendations:  Continue to assess pending progress        Assessment   Body structures, Functions, Activity limitations: Decreased functional mobility ; Decreased high-level IADLs;Decreased ROM; Decreased strength; Increased pain  Assessment: Pt required max verbal cues for participation today. Pt was able to sit EOB for prolonged period. PT encouraged pt to be more pro-active regarding mobility. Treatment Diagnosis: functional decline  Prognosis: Fair  Decision Making: Medium Complexity  REQUIRES PT FOLLOW UP: Yes  Activity Tolerance  Activity Tolerance: Patient Tolerated treatment well;Patient limited by endurance     Patient Diagnosis(es): There were no encounter diagnoses. has a past medical history of Hypertension, Orthopnea, Seizures ([address] and Thyroid disease. has a past surgical history that includes Cholecystectomy. Restrictions  Restrictions/Precautions  Restrictions/Precautions: Fall Risk, General Precautions  Required Braces or Orthoses?: No  Subjective   General  Chart Reviewed: Yes  Family / Caregiver Present: No  Referring Practitioner: Shelton Carlson PA-C  Subjective  Subjective: Pt presents supine in bed, she is agreeable to work with PT.           Orientation  Orientation  Overall Orientation Status: Within Normal Limits  Cognition      Objective   Bed mobility  Rolling to Left: Moderate assistance  Rolling to Right: Moderate assistance  Supine to Sit: Modified independent  Sit to Supine: Maximum assistance;2 Person assistance  Scooting: Contact guard assistance  Transfers  Sit to Stand: 2 Person Assistance;Minimal Assistance  Stand to sit: Minimal Assistance  Ambulation  Ambulation?: No        Exercises  Quad Sets: 20x B  Gluteal Sets: 20x B  Hip Abduction: 20x B      Goals  Short term goals  Time Frame for Short term goals: 1 week  Short term goal 1: Pt to be able to sit EOB x SBA x 30 minutes. Short term goal 2: Pt to stand x 2 minutes with a walker x CGA  Short term goal 3: Pt to be able to perform a stand pivot transfer x Min A x 2. Short term goal 4: Pt to move supine to sit x Mod A and vice versa. Plan    Plan  Times per week: 4-5 days per week  Plan weeks: 1-2  Current Treatment Recommendations: Strengthening, Gait Training, Patient/Caregiver Education & Training, ROM, Equipment Evaluation, Education, & procurement, Pain Management, Positioning, Home Exercise Program, Endurance Training, Functional Mobility Training, Safety Education & Training, Transfer Training  Safety Devices  Type of devices: Left in bed, Call light within reach     Therapy Time   Individual Concurrent Group Co-treatment   Time In 1530         Time Out 1644         Minutes 76                 [place], [place]       This note serves as D/C summary if patient is discharged prior to next visit.

## 2020-03-11 PROCEDURE — 97110 THERAPEUTIC EXERCISES: CPT

## 2020-03-11 PROCEDURE — 1200000002 HC SEMI PRIVATE SWING BED

## 2020-03-11 PROCEDURE — 6360000002 HC RX W HCPCS: Performed by: PHYSICIAN ASSISTANT

## 2020-03-11 PROCEDURE — 6370000000 HC RX 637 (ALT 250 FOR IP): Performed by: PHYSICIAN ASSISTANT

## 2020-03-11 PROCEDURE — 2500000003 HC RX 250 WO HCPCS: Performed by: PHYSICIAN ASSISTANT

## 2020-03-11 RX ADMIN — GABAPENTIN 300 MG: 300 CAPSULE ORAL at 21:15

## 2020-03-11 RX ADMIN — MICONAZOLE NITRATE: 20 POWDER TOPICAL at 21:38

## 2020-03-11 RX ADMIN — LEVOTHYROXINE SODIUM 112 MCG: 112 TABLET ORAL at 06:27

## 2020-03-11 RX ADMIN — NYSTATIN 500000 UNITS: 500000 SUSPENSION ORAL at 18:06

## 2020-03-11 RX ADMIN — CARVEDILOL 6.25 MG: 6.25 TABLET, FILM COATED ORAL at 18:06

## 2020-03-11 RX ADMIN — MICONAZOLE NITRATE: 20 POWDER TOPICAL at 08:52

## 2020-03-11 RX ADMIN — FUROSEMIDE 40 MG: 40 TABLET ORAL at 08:47

## 2020-03-11 RX ADMIN — NYSTATIN 500000 UNITS: 500000 SUSPENSION ORAL at 08:48

## 2020-03-11 RX ADMIN — LOSARTAN POTASSIUM 100 MG: 50 TABLET, FILM COATED ORAL at 08:46

## 2020-03-11 RX ADMIN — NYSTATIN 500000 UNITS: 500000 SUSPENSION ORAL at 21:16

## 2020-03-11 RX ADMIN — POTASSIUM BICARBONATE 20 MEQ: 782 TABLET, EFFERVESCENT ORAL at 08:48

## 2020-03-11 RX ADMIN — GABAPENTIN 300 MG: 300 CAPSULE ORAL at 08:48

## 2020-03-11 RX ADMIN — DICLOFENAC 2 G: 10 GEL TOPICAL at 08:47

## 2020-03-11 RX ADMIN — FAMOTIDINE 40 MG: 20 TABLET, FILM COATED ORAL at 08:48

## 2020-03-11 RX ADMIN — CARVEDILOL 6.25 MG: 6.25 TABLET, FILM COATED ORAL at 08:48

## 2020-03-11 RX ADMIN — CETIRIZINE HYDROCHLORIDE 5 MG: 10 TABLET, FILM COATED ORAL at 08:48

## 2020-03-11 RX ADMIN — NYSTATIN 500000 UNITS: 500000 SUSPENSION ORAL at 13:57

## 2020-03-11 RX ADMIN — ENOXAPARIN SODIUM 40 MG: 40 INJECTION SUBCUTANEOUS at 08:49

## 2020-03-11 RX ADMIN — PHENYTOIN SODIUM 500 MG: 100 CAPSULE ORAL at 21:14

## 2020-03-11 RX ADMIN — DICLOFENAC 2 G: 10 GEL TOPICAL at 21:14

## 2020-03-11 NOTE — FLOWSHEET NOTE
03/11/20 0850   Assessment   Charting Type Shift assessment   Neurological   Neuro (WDL) WDL   Level of Consciousness 0   [place] Coma Scale   Eye Opening 4   Best Verbal Response 5   Best Motor Response 6   [place] Coma Scale Score 15   HEENT   HEENT (WDL) X   Right Eye Impaired vision   Left Eye Impaired vision   Voice Normal   Mucous Membrane Pink;Moist   Teeth Missing teeth   Respiratory   Respiratory (WDL) WDL   Respiratory Pattern Regular   Respiratory Depth Normal   Respiratory Quality/Effort Unlabored;Dyspnea with exertion   Chest Assessment Chest expansion symmetrical;Trachea midline   L Breath Sounds Clear;Diminished   R Breath Sounds Clear;Diminished   Breath Sounds   Right Upper Lobe Clear   Right Middle Lobe Diminished   Right Lower Lobe Diminished   Left Upper Lobe Clear   Left Lower Lobe Diminished   Cough/Sputum   Cough Congested   Frequency Occasional   Sputum Amount None   Incentive Spirometry Tx   Respiratory Effort Dyspnea with Exertion   Cardiac   Cardiac (WDL) WDL   Cardiac Monitor   Telemetry Monitor On No   Gastrointestinal   Abdominal (WDL) WDL   RUQ Bowel Sounds Active   LUQ Bowel Sounds Active   RLQ Bowel Sounds Active   LLQ Bowel Sounds Active   Abdomen Inspection Rounded;Soft;Rotund   Last BM (including prior to admit) 03/10/20   Tenderness Soft;Nontender   Peripheral Vascular   Peripheral Vascular (WDL) WDL   Edema Generalized   Edema Generalized Trace   RLE Edema +2   LLE Edema +2   RUE Neurovascular Assessment   Capillary Refill Less than/equal to 3 seconds   Color Pink   Temperature Warm   LUE Neurovascular Assessment   Capillary Refill Less than/equal to 3 seconds   Color Pink   Temperature Warm   RLE Neurovascular Assessment   Capillary Refill Less than/equal to 3 seconds   Color Pink   Temperature Warm   LLE Neurovascular Assessment   Capillary Refill Less than/equal to 3 seconds   Color Pink   Temperature Warm   Skin Color/Condition   Skin Color/Condition (WDL) X   Skin Color Pink   Skin Condition/Temp Warm;Dry;Flaky   Skin Integrity   Skin Integrity (WDL) X   Skin Integrity Redness   Location abdominal folds   Preventative Dressing No   Assessed this shift? Yes   Skin Fold Management Yes   Dressing Site Abdominal pannus   Treatment Pharmaceutical   Date applied? 03/11/20   Assessed this shift Red;Dry   Multiple Skin Integrity Sites Yes   Skin Integrity Site 2   Skin Integrity Location 2 Redness   Location 2 buttocks   Preventative Dressing No   Musculoskeletal   Musculoskeletal (WDL) X   RUE Limited movement;Weakness   LUE Limited movement;Weakness   RL Extremity Limited movement;Weakness   LL Extremity Limited movement;Weakness   Genitourinary   Genitourinary (WDL) WDL   Flank Tenderness No   Suprapubic Tenderness No   Dysuria No   Urine Assessment   Incontinence No   Urine Color Yellow/straw   Urine Appearance Clear   Wound 02/19/20 Heel Left skin intact black in color   Date First Assessed/Time First Assessed: 02/19/20 1530   Primary Wound Type: Pressure Injury  Location: Heel  Wound Location Orientation: Left  Wound Description (Comments): skin intact black in color   Dressing Status Clean;Dry; Intact   Dressing/Treatment Foam   Dressing Change Due 03/12/20   Judith-wound Assessment Intact   Wound 02/20/20 Buttocks Right Stage II Red in color   Date First Assessed/Time First Assessed: 02/20/20 0824   Primary Wound Type: Pressure Injury  Location: Buttocks  Wound Location Orientation: Right  Wound Description (Comments): Stage II Red in color   Dressing Status Clean;Dry; Intact   Dressing/Treatment Other (comment)  (harshil)   Psychosocial   Psychosocial (WDL) WDL   Pt resting in bariatric bed in no distress at this time. Pt somewhat drowsy but aroused after talking and getting medicine ready. Pt states she did not sleep well. Pt took medication with no difficulty. Pt dressing intact left heel and heel protectors on bilateral heels. Pt in no distress at this time and no requests.

## 2020-03-11 NOTE — FLOWSHEET NOTE
Pt laying in bed, supine. No acute distress noted at this time. Pt is Alert and Oriented x 4. No change from previous assessment. POC for the day reviewed, Pt denies questions. Pt denies any Needs at this time. Bed in lowest, locked position, call light with in reach. Will continue to monitor. 03/10/20 2046   Assessment   Charting Type Shift assessment   Neurological   Neuro (WDL) WDL   Level of Consciousness 0   Swallow Screening   Is the patient able to remain alert for testing? Yes   Was the Patient Eating a Modified Diet Prior to being Admitted? No   Is there an Existing PEG or Abdominal Feeding Tube? No   Does the patient have a Head of Bed [facility] restriction <30°? No   Is there a Tracheostomy Tube Present?  No   Huy Coma Scale   Eye Opening 4   Best Verbal Response 5   Best Motor Response 6   [place] Coma Scale Score 15   NIH/MNHISS Stroke Scale   NIH/MNIHSS Stroke Scale Assessed No   HEENT   HEENT (WDL) X   Right Eye Impaired vision   Left Eye Impaired vision   Tongue Coated   Voice Normal   Mucous Membrane Pink;Moist   Teeth Missing teeth   Respiratory   Respiratory (WDL) WDL   Respiratory Pattern Regular   Respiratory Depth Normal   Respiratory Quality/Effort Unlabored;Dyspnea with exertion   Chest Assessment Chest expansion symmetrical;Trachea midline   L Breath Sounds Clear;Diminished   R Breath Sounds Clear;Diminished   Breath Sounds   Right Upper Lobe Clear   Right Middle Lobe Diminished   Right Lower Lobe Diminished   Left Upper Lobe Clear   Left Lower Lobe Diminished   Cough/Sputum   Sputum Amount None   Incentive Spirometry Tx   Respiratory Effort Dyspnea with Exertion   Cough and Deep Breathe   Cough and Deep Breathe Yes   Cardiac   Cardiac (WDL) WDL   Cardiac Monitor   Telemetry Monitor On No   Gastrointestinal   Abdominal (WDL) WDL   RUQ Bowel Sounds Active   LUQ Bowel Sounds Active   RLQ Bowel Sounds Active   LLQ Bowel Sounds Active   Peripheral Vascular   Peripheral Vascular (WDL) WDL RUE Neurovascular Assessment   Capillary Refill Less than/equal to 3 seconds   Color Pink   Temperature Warm   LUE Neurovascular Assessment   Capillary Refill Less than/equal to 3 seconds   Color Pink   Temperature Warm   RLE Neurovascular Assessment   Capillary Refill Less than/equal to 3 seconds   Color Pink   Temperature Warm   LLE Neurovascular Assessment   Capillary Refill Less than/equal to 3 seconds   Color Pink   Temperature Warm   Skin Color/Condition   Skin Color/Condition (WDL) X   Skin Color Red  (skin folds)   Skin Condition/Temp Warm;Dry   Skin Integrity   Skin Integrity (WDL) X   Skin Integrity Redness   Location abdominal folds   Preventative Dressing No   Assessed this shift? Yes   Skin Fold Management Yes   Dressing Site Abdominal pannus   Treatment Pharmaceutical   Assessed this shift Red;Dry   Multiple Skin Integrity Sites Yes   Skin Integrity Site 2   Skin Integrity Location 2 Redness   Location 2 buttocks   Preventative Dressing No   Assessed this shift? Yes   Dressing Site Sacrum   Musculoskeletal   Musculoskeletal (WDL) X   RUE Limited movement;Weakness   LUE Limited movement;Weakness   RL Extremity Limited movement;Weakness   LL Extremity Limited movement;Weakness   Genitourinary   Genitourinary (WDL) WDL   Flank Tenderness No   Suprapubic Tenderness No   Dysuria No   Urine Assessment   Incontinence No   Wound 02/19/20 Heel Left skin intact black in color   Date First Assessed/Time First Assessed: 02/19/20 1530   Primary Wound Type: Pressure Injury  Location: Heel  Wound Location Orientation: Left  Wound Description (Comments): skin intact black in color   Wound Pressure Unstageable   Offloading for Diabetic Foot Ulcers Offloading boot   Dressing Status Clean;Dry; Intact   Dressing Change Due 03/12/20   Judith-wound Assessment Intact   Wound 02/20/20 Buttocks Right Stage II Red in color   Date First Assessed/Time First Assessed: 02/20/20 0824   Primary Wound Type: Pressure Injury  Location:

## 2020-03-11 NOTE — PROGRESS NOTES
Occupational Therapy  Facility/Department: [facility] MED SURG  Daily Treatment Note  NAME: Nayla Segundo  : 1950  MRN: [id number]    Date of Service: 3/11/2020    Discharge Recommendations:  Continue to assess pending progress       Assessment   Assessment: Pt agreeable to OT services. Declined to sit at EOB. Pt tolerated UE ther ex lying supine in bed. Patient Diagnosis(es): There were no encounter diagnoses. has a past medical history of Hypertension, Orthopnea, Seizures ([address] and Thyroid disease. has a past surgical history that includes Cholecystectomy. Restrictions  Restrictions/Precautions  Restrictions/Precautions: Fall Risk, General Precautions  Required Braces or Orthoses?: No  Subjective   General  Chart Reviewed: Yes  Patient assessed for rehabilitation services?: Yes  Family / Caregiver Present: No  Referring Practitioner: GARO Montero  Diagnosis: Generalized weakness, UTI,   Subjective  Subjective: Pt reports pain in BLE feet and back. Agreeable to attempt therapy services on this date. Orientation     Objective            Type of ROM/Therapeutic Exercise  Type of ROM/Therapeutic Exercise: AROM; Free weights  Exercises  Scapular Protraction: x15  Scapular Retraction: x15  Shoulder Depression: x15  Shoulder Elevation: x15  Shoulder Flexion: x15  Shoulder ABduction: x15  Horizontal ABduction: x15  Elbow Flexion: x15  Elbow Extension: x15  Supination: x15  Pronation: x15  Wrist Flexion: x15  Wrist Extension: x15                    Plan   Plan  Times per week: 3-5  Times per day: Daily  Plan weeks: 1  Current Treatment Recommendations: Strengthening, Endurance Training, Functional Mobility Training, Positioning, Safety Education & Training, Self-Care / ADL, Patient/Caregiver Education & Training    Goals  Short term goals  Time Frame for Short term goals: 1 week  Short term goal 1: Pt to complete bed mobility with MOD I.    Short term goal 2: Pt to complete toilet transfer with mod assist using RW. Short term goal 3: Pt to complete toileting with MOD A. Short term goal 4: Pt to complete dressing with MOD assist.   Short term goal 5: pt to tolerate x15 minutes of activity to increase functional activity tolerance. Long term goals  Time Frame for Long term goals : 2 week  Long term goal 1: Pt to complete toileting with CGA. Long term goal 2: Pt to complete dressing with CGA. Long term goal 3: Pt to complete toileting with MIN A. Therapy Time   Individual Concurrent Group Co-treatment   Time In 3296         Time Out 0315         Minutes 28              This note serves as a DC summary in the event of pt discharge.      Palmira Restrepo, OTR/L

## 2020-03-11 NOTE — CARE COORDINATION
Spoke to pt at [facility]. She is wanting to go home tomorrow when her son gets off work. She is going to talk to him tonight and tell him she wants to go home.   Educated that she will have EMS transport

## 2020-03-11 NOTE — CARE COORDINATION
Spoke to daughter on the phone again about bed being delivered tomorrow and plans to DC pt home. She states the bed is not supposed to be delivered until tomorrow afternoon. I have asked for her to have pt's on, Josey Nagy, to call me back to discuss DC plans (again). I did charlene and speak to Josey Nagy at his place of employment. He is agreeable for DC home. Requesting Friday morning and transport by EMS. Josey Nagy states he will call as soon as bed is set up tomorrow. (I have also asked for [facility] to call when set up at the home)    Family and pt have stated multiple times that pt can go home as soon as bed is available. They are agreeable to [facility] at home. No further DC needs noted.

## 2020-03-12 PROCEDURE — 2500000003 HC RX 250 WO HCPCS: Performed by: PHYSICIAN ASSISTANT

## 2020-03-12 PROCEDURE — 1200000002 HC SEMI PRIVATE SWING BED

## 2020-03-12 PROCEDURE — 6360000002 HC RX W HCPCS: Performed by: PHYSICIAN ASSISTANT

## 2020-03-12 PROCEDURE — 6370000000 HC RX 637 (ALT 250 FOR IP): Performed by: PHYSICIAN ASSISTANT

## 2020-03-12 RX ADMIN — MICONAZOLE NITRATE: 20 POWDER TOPICAL at 21:12

## 2020-03-12 RX ADMIN — PHENYTOIN SODIUM 500 MG: 100 CAPSULE ORAL at 21:11

## 2020-03-12 RX ADMIN — GABAPENTIN 300 MG: 300 CAPSULE ORAL at 09:20

## 2020-03-12 RX ADMIN — CARVEDILOL 6.25 MG: 6.25 TABLET, FILM COATED ORAL at 17:42

## 2020-03-12 RX ADMIN — ENOXAPARIN SODIUM 40 MG: 40 INJECTION SUBCUTANEOUS at 09:19

## 2020-03-12 RX ADMIN — FUROSEMIDE 40 MG: 40 TABLET ORAL at 09:20

## 2020-03-12 RX ADMIN — CETIRIZINE HYDROCHLORIDE 5 MG: 10 TABLET, FILM COATED ORAL at 09:20

## 2020-03-12 RX ADMIN — LOSARTAN POTASSIUM 100 MG: 50 TABLET, FILM COATED ORAL at 09:23

## 2020-03-12 RX ADMIN — CARVEDILOL 6.25 MG: 6.25 TABLET, FILM COATED ORAL at 09:23

## 2020-03-12 RX ADMIN — LEVOTHYROXINE SODIUM 112 MCG: 112 TABLET ORAL at 06:05

## 2020-03-12 RX ADMIN — NYSTATIN 500000 UNITS: 500000 SUSPENSION ORAL at 09:23

## 2020-03-12 RX ADMIN — POTASSIUM BICARBONATE 20 MEQ: 782 TABLET, EFFERVESCENT ORAL at 09:24

## 2020-03-12 RX ADMIN — MICONAZOLE NITRATE: 20 POWDER TOPICAL at 10:19

## 2020-03-12 RX ADMIN — NYSTATIN 500000 UNITS: 500000 SUSPENSION ORAL at 17:42

## 2020-03-12 RX ADMIN — SENNOSIDES AND DOCUSATE SODIUM 2 TABLET: 8.6; 5 TABLET ORAL at 09:19

## 2020-03-12 RX ADMIN — GABAPENTIN 300 MG: 300 CAPSULE ORAL at 21:11

## 2020-03-12 RX ADMIN — FAMOTIDINE 40 MG: 20 TABLET, FILM COATED ORAL at 09:24

## 2020-03-12 RX ADMIN — ACETAMINOPHEN 650 MG: 325 TABLET, FILM COATED ORAL at 21:27

## 2020-03-12 RX ADMIN — NYSTATIN 500000 UNITS: 500000 SUSPENSION ORAL at 21:11

## 2020-03-12 RX ADMIN — DICLOFENAC 2 G: 10 GEL TOPICAL at 21:11

## 2020-03-12 RX ADMIN — DICLOFENAC 2 G: 10 GEL TOPICAL at 09:19

## 2020-03-12 ASSESSMENT — PAIN SCALES - GENERAL
PAINLEVEL_OUTOF10: 0
PAINLEVEL_OUTOF10: 10

## 2020-03-12 NOTE — CARE COORDINATION
Bed was delivered today. Plan to DC home tomorrow by ambulance. HH ordered with [facility]. No further DC needs per pt. Will need EMS transport home.

## 2020-03-12 NOTE — FLOWSHEET NOTE
Pt laying in bed, supine. No acute distress noted at this time. Pt is Alert and Oriented x 4. No tele on room air. No IV access. No change from previous assessment. POC for the day reviewed, Pt denies questions. Pt denies any Needs at this time. Bed in lowest, locked position, call light with in reach. Will continue to monitor. 03/11/20 2003   Assessment   Charting Type Shift assessment   Neurological   Neuro (WDL) WDL   Level of Consciousness 0   Swallow Screening   Is the patient able to remain alert for testing? Yes   Was the Patient Eating a Modified Diet Prior to being Admitted? No   Is there an Existing PEG or Abdominal Feeding Tube? No   Does the patient have a Head of Bed [facility] restriction <30°? No   Is there a Tracheostomy Tube Present?  No   [place] Coma Scale   Eye Opening 4   Best Verbal Response 5   Best Motor Response 6   [place] Coma Scale Score 15   NIH/MNHISS Stroke Scale   NIH/MNIHSS Stroke Scale Assessed No   HEENT   HEENT (WDL) X   Right Eye Impaired vision   Left Eye Impaired vision   Tongue Coated   Voice Normal   Mucous Membrane Pink;Moist   Teeth Missing teeth   Respiratory   Respiratory (WDL) WDL   Respiratory Pattern Regular   Respiratory Depth Normal   Respiratory Quality/Effort Unlabored;Dyspnea with exertion   Chest Assessment Chest expansion symmetrical;Trachea midline   L Breath Sounds Clear;Diminished   R Breath Sounds Clear;Diminished   Subcutaneous air/Crepitus None   Breath Sounds   Right Upper Lobe Clear   Right Middle Lobe Diminished   Right Lower Lobe Diminished   Left Upper Lobe Clear   Left Lower Lobe Diminished   Cough/Sputum   Sputum Amount None   Incentive Spirometry Tx   Respiratory Effort Dyspnea with Exertion   Cough and Deep Breathe   Cough and Deep Breathe Yes   Cardiac   Cardiac (WDL) WDL   Cardiac Monitor   Telemetry Monitor On No   Gastrointestinal   Abdominal (WDL) WDL   RUQ Bowel Sounds Active   LUQ Bowel Sounds Active   RLQ Bowel Sounds Active   LLQ Bowel Sounds Active   Abdomen Inspection Rounded   Peripheral Vascular   Peripheral Vascular (WDL) WDL   RUE Neurovascular Assessment   Capillary Refill Less than/equal to 3 seconds   Color Pink   Temperature Warm   LUE Neurovascular Assessment   Capillary Refill Less than/equal to 3 seconds   Color Pink   Temperature Warm   RLE Neurovascular Assessment   Capillary Refill Less than/equal to 3 seconds   Color Pink   Temperature Warm   LLE Neurovascular Assessment   Capillary Refill Less than/equal to 3 seconds   Color Pink   Temperature Warm   Skin Color/Condition   Skin Color/Condition (WDL) X   Skin Color Pink   Skin Condition/Temp Warm;Dry;Flaky   Skin Integrity   Skin Integrity (WDL) X   Skin Integrity Redness   Location abdominal folds   Preventative Dressing No   Assessed this shift? Yes   Skin Fold Management Yes   Dressing Site Abdominal pannus   Treatment Pharmaceutical   Assessed this shift Red;Dry   Multiple Skin Integrity Sites Yes   Skin Integrity Site 2   Skin Integrity Location 2 Redness   Location 2 buttocks   Preventative Dressing No   Assessed this shift? Yes   Dressing Site Sacrum   Musculoskeletal   Musculoskeletal (WDL) X   RUE Limited movement;Weakness   LUE Limited movement;Weakness   RL Extremity Limited movement;Weakness   LL Extremity Limited movement;Weakness   Genitourinary   Genitourinary (WDL) WDL   Flank Tenderness No   Suprapubic Tenderness No   Dysuria No   Urine Assessment   Incontinence No   Wound 02/19/20 Heel Left skin intact black in color   Date First Assessed/Time First Assessed: 02/19/20 1530   Primary Wound Type: Pressure Injury  Location: Heel  Wound Location Orientation: Left  Wound Description (Comments): skin intact black in color   Wound Pressure Unstageable   Offloading for Diabetic Foot Ulcers Offloading boot   Dressing Status Clean;Dry; Intact   Dressing Change Due 03/12/20   Wound 02/20/20 Buttocks Right Stage II Red in color   Date First Assessed/Time First Assessed: 02/20/20 0824   Primary Wound Type: Pressure Injury  Location: Buttocks  Wound Location Orientation: Right  Wound Description (Comments): Stage II Red in color   Wound Pressure Stage  2   Dressing Status Clean;Dry; Intact   Dressing/Treatment Other (comment)  (harshil)   Psychosocial   Psychosocial (WDL) WDL

## 2020-03-13 VITALS
OXYGEN SATURATION: 91 % | RESPIRATION RATE: 20 BRPM | BODY MASS INDEX: 47.09 KG/M2 | WEIGHT: 293 LBS | SYSTOLIC BLOOD PRESSURE: 133 MMHG | TEMPERATURE: 98 F | HEIGHT: 66 IN | HEART RATE: 70 BPM | DIASTOLIC BLOOD PRESSURE: 49 MMHG

## 2020-03-13 PROBLEM — S91.302A OPEN WOUND OF LEFT HEEL: Status: ACTIVE | Noted: 2020-03-13

## 2020-03-13 PROCEDURE — 6370000000 HC RX 637 (ALT 250 FOR IP): Performed by: PHYSICIAN ASSISTANT

## 2020-03-13 PROCEDURE — 6360000002 HC RX W HCPCS: Performed by: PHYSICIAN ASSISTANT

## 2020-03-13 PROCEDURE — 99315 NF DSCHRG MGMT 30 MIN/LESS: CPT | Performed by: INTERNAL MEDICINE

## 2020-03-13 RX ORDER — FUROSEMIDE 40 MG/1
40 TABLET ORAL DAILY
Qty: 60 TABLET | Refills: 3 | Status: SHIPPED | OUTPATIENT
Start: 2020-03-13

## 2020-03-13 RX ORDER — PHENYTOIN SODIUM 100 MG/1
500 CAPSULE, EXTENDED RELEASE ORAL NIGHTLY
Qty: 150 CAPSULE | Refills: 0 | Status: SHIPPED | OUTPATIENT
Start: 2020-03-13 | End: 2020-04-12

## 2020-03-13 RX ORDER — RANITIDINE 150 MG/1
150 TABLET ORAL 2 TIMES DAILY
Qty: 60 TABLET | Refills: 3 | Status: SHIPPED | OUTPATIENT
Start: 2020-03-13

## 2020-03-13 RX ORDER — POTASSIUM CHLORIDE 1.5 G/1.77G
20 POWDER, FOR SOLUTION ORAL DAILY
Qty: 30 EACH | Refills: 3 | Status: SHIPPED | OUTPATIENT
Start: 2020-03-13

## 2020-03-13 RX ORDER — SENNA AND DOCUSATE SODIUM 50; 8.6 MG/1; MG/1
2 TABLET, FILM COATED ORAL DAILY
Qty: 60 TABLET | Refills: 0 | Status: SHIPPED | OUTPATIENT
Start: 2020-03-13

## 2020-03-13 RX ORDER — LEVOTHYROXINE SODIUM 112 UG/1
112 TABLET ORAL DAILY
Qty: 30 TABLET | Refills: 3 | Status: SHIPPED | OUTPATIENT
Start: 2020-03-13

## 2020-03-13 RX ORDER — LIDOCAINE 4 G/G
2 PATCH TOPICAL DAILY
Qty: 60 PATCH | Refills: 0 | Status: SHIPPED | OUTPATIENT
Start: 2020-03-13

## 2020-03-13 RX ORDER — LOSARTAN POTASSIUM 100 MG/1
100 TABLET ORAL DAILY
Qty: 30 TABLET | Refills: 3 | Status: SHIPPED | OUTPATIENT
Start: 2020-03-13

## 2020-03-13 RX ORDER — CARVEDILOL 6.25 MG/1
6.25 TABLET ORAL 2 TIMES DAILY WITH MEALS
Qty: 60 TABLET | Refills: 3 | Status: SHIPPED | OUTPATIENT
Start: 2020-03-13

## 2020-03-13 RX ADMIN — ENOXAPARIN SODIUM 40 MG: 40 INJECTION SUBCUTANEOUS at 10:24

## 2020-03-13 RX ADMIN — GABAPENTIN 300 MG: 300 CAPSULE ORAL at 10:22

## 2020-03-13 RX ADMIN — LEVOTHYROXINE SODIUM 112 MCG: 112 TABLET ORAL at 06:16

## 2020-03-13 RX ADMIN — LOSARTAN POTASSIUM 100 MG: 50 TABLET, FILM COATED ORAL at 10:22

## 2020-03-13 RX ADMIN — FAMOTIDINE 40 MG: 20 TABLET, FILM COATED ORAL at 10:22

## 2020-03-13 RX ADMIN — POTASSIUM BICARBONATE 20 MEQ: 782 TABLET, EFFERVESCENT ORAL at 10:22

## 2020-03-13 RX ADMIN — DICLOFENAC 2 G: 10 GEL TOPICAL at 10:22

## 2020-03-13 RX ADMIN — CETIRIZINE HYDROCHLORIDE 5 MG: 10 TABLET, FILM COATED ORAL at 10:22

## 2020-03-13 RX ADMIN — MAGNESIUM HYDROXIDE 30 ML: 400 SUSPENSION ORAL at 10:23

## 2020-03-13 RX ADMIN — FUROSEMIDE 40 MG: 40 TABLET ORAL at 10:22

## 2020-03-13 RX ADMIN — NYSTATIN 500000 UNITS: 500000 SUSPENSION ORAL at 10:23

## 2020-03-13 RX ADMIN — MICONAZOLE NITRATE: 20 POWDER TOPICAL at 10:25

## 2020-03-13 RX ADMIN — CARVEDILOL 6.25 MG: 6.25 TABLET, FILM COATED ORAL at 10:22

## 2020-03-13 RX ADMIN — SENNOSIDES AND DOCUSATE SODIUM 2 TABLET: 8.6; 5 TABLET ORAL at 10:24
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
## 2020-03-13 NOTE — FLOWSHEET NOTE
Pt laying in bed, Alert, No acute distress noted at this time. Pt is Alert and Oriented x 4. Dr. Kendra Frost and Efren WYMAN at bedside for skin assessment. No change from previous assessment. POC for the day reviewed, including DC home. See MAR. Pt denies questions. Pt denies any other needs at this time. Will continue to monitor.         03/13/20 1020 03/13/20 1123   Assessment   Charting Type Shift assessment  --    Neurological   Neuro ([facility]) WDL  --    Level of Consciousness 0  --    [place] Coma Scale   Eye Opening 4  --    Best Verbal Response 5  --    Best Motor Response 6  --    Huy Coma Scale Score 15  --    HEENT   HEENT ([facility]) X  --    Right Eye Impaired vision  --    Left Eye Impaired vision  --    Tongue Coated  --    Voice Normal  --    Mucous Membrane Pink;Moist  --    Teeth Missing teeth  --    Respiratory   Respiratory ([facility]) WDL  --    Respiratory Pattern Regular  --    Respiratory Depth Normal  --    Respiratory Quality/Effort Unlabored;Dyspnea with exertion  --    Chest Assessment Chest expansion symmetrical;Trachea midline  --    L Breath Sounds Clear;Diminished  --    R Breath Sounds Clear;Diminished  --    Subcutaneous air/Crepitus None  --    Breath Sounds   Right Upper Lobe Clear  --    Right Middle Lobe Diminished  --    Right Lower Lobe Diminished  --    Left Upper Lobe Clear  --    Left Lower Lobe Diminished  --    Cough/Sputum   Cough Congested  --    Sputum Amount None  --    Sputum Color Yellow  --    Incentive Spirometry Tx   Respiratory Effort Dyspnea with Exertion  --    Cardiac   Cardiac ([facility]) WDL  --    Cardiac Monitor   Telemetry Monitor On No  --    Gastrointestinal   Abdominal ([facility]) WD  --    RUQ Bowel Sounds Active  --    LUQ Bowel Sounds Active  --    RLQ Bowel Sounds Active  --    LLQ Bowel Sounds Active  --    Abdomen Inspection Rounded  (sever obesity)  --    Tenderness Soft;Nontender  --    Peripheral Vascular   Peripheral Vascular ([facility]) WDL  --    Edema Generalized  -- Edema Generalized Trace  --    RLE Edema +2;Non-pitting  --    LLE Edema +2;Non-pitting  --    RUE Neurovascular Assessment   Capillary Refill Less than/equal to 3 seconds  --    Color Pink  --    Temperature Warm  --    LUE Neurovascular Assessment   Capillary Refill Less than/equal to 3 seconds  --    Color Pink  --    Temperature Warm  --    RLE Neurovascular Assessment   Capillary Refill Less than/equal to 3 seconds  --    Color Pink;Francisco  --    Temperature Warm  --    LLE Neurovascular Assessment   Capillary Refill Less than/equal to 3 seconds  --    Color Pink;Francisco  --    Temperature Warm  --    Skin Color/Condition   Skin Color/Condition ([facility]) X  --    Skin Condition/Temp Dry;Flaky  --    Skin Integrity   Skin Integrity ([facility]) X  --    Skin Integrity Redness  --    Location all folds. --    Preventative Dressing Yes  Lawayne Lisandro)  --    Dressing Site Abdominal pannus  --    Treatment Pharmaceutical  --    Multiple Skin Integrity Sites Yes  --    Musculoskeletal   Musculoskeletal ([facility]) X  --    RUE Limited movement;Weakness  --    LUE Limited movement;Weakness  --    RL Extremity Limited movement;Weakness  --    LL Extremity Limited movement;Weakness  --    Genitourinary   Genitourinary ([facility]) WDL  --    Flank Tenderness No  --    Suprapubic Tenderness No  --    Dysuria No  --    Urine Assessment   Incontinence No  --    Wound 02/19/20 Heel Left skin intact black in color   Date First Assessed/Time First Assessed: 02/19/20 1530   Primary Wound Type: Pressure Injury  Location: Heel  Wound Location Orientation: Left  Wound Description (Comments): skin intact black in color   Wound Image  --     Offloading for Diabetic Foot Ulcers  --  Offloading boot   Dressing Status  --  Clean;Dry; Intact   Dressing Changed  --  Changed/New   Dressing/Treatment  --  Hydrocolloid; Foam   Wound Cleansed  --  Wound cleanser   Dressing Change Due  --  03/16/20   Wound Length (cm)  --    (Pt does not tolerated measuring of wound. )

## 2020-03-17 PROBLEM — N39.0 UTI (URINARY TRACT INFECTION): Status: RESOLVED | Noted: 2020-02-12 | Resolved: 2020-03-17

## 2020-06-17 ENCOUNTER — HOSPITAL ENCOUNTER (OUTPATIENT)
Facility: HOSPITAL | Age: 70
Discharge: HOME | End: 2020-06-17
Payer: MEDICAID

## 2020-06-17 LAB
BACTERIA: ABNORMAL /HPF
BILIRUBIN URINE: NEGATIVE
BLOOD, URINE: ABNORMAL
CLARITY: ABNORMAL
COLOR: YELLOW
EPITHELIAL CELLS, UA: ABNORMAL /HPF (ref 0–5)
GLUCOSE URINE: NEGATIVE MG/DL
KETONES, URINE: NEGATIVE MG/DL
LEUKOCYTE ESTERASE, URINE: ABNORMAL
MICROSCOPIC EXAMINATION: YES
NITRITE, URINE: POSITIVE
PH UA: 6 (ref 5–8)
PROTEIN UA: 30 MG/DL
RBC UA: ABNORMAL /HPF (ref 0–4)
SPECIFIC GRAVITY UA: 1.02 (ref 1–1.03)
URINE REFLEX TO CULTURE: YES
URINE TYPE: ABNORMAL
UROBILINOGEN, URINE: 0.2 E.U./DL
WBC UA: ABNORMAL /HPF (ref 0–5)

## 2020-06-17 PROCEDURE — 81001 URINALYSIS AUTO W/SCOPE: CPT

## 2020-06-17 PROCEDURE — 87086 URINE CULTURE/COLONY COUNT: CPT

## 2020-06-18 LAB — URINE CULTURE, ROUTINE: NORMAL

## 2020-06-29 ENCOUNTER — HOSPITAL ENCOUNTER (OUTPATIENT)
Facility: HOSPITAL | Age: 70
Discharge: HOME | End: 2020-06-29
Payer: MEDICAID

## 2020-06-29 LAB
BACTERIA: ABNORMAL /HPF
BILIRUBIN URINE: NEGATIVE
BLOOD, URINE: ABNORMAL
CLARITY: ABNORMAL
COLOR: YELLOW
EPITHELIAL CELLS, UA: ABNORMAL /HPF (ref 0–5)
GLUCOSE URINE: NEGATIVE MG/DL
KETONES, URINE: NEGATIVE MG/DL
LEUKOCYTE ESTERASE, URINE: ABNORMAL
MICROSCOPIC EXAMINATION: YES
NITRITE, URINE: POSITIVE
PH UA: 6.5 (ref 5–8)
PROTEIN UA: NEGATIVE MG/DL
RBC UA: ABNORMAL /HPF (ref 0–4)
SPECIFIC GRAVITY UA: 1.02 (ref 1–1.03)
URINE REFLEX TO CULTURE: YES
URINE TYPE: ABNORMAL
UROBILINOGEN, URINE: 0.2 E.U./DL
WBC UA: >100 /HPF (ref 0–5)

## 2020-06-29 PROCEDURE — 87186 SC STD MICRODIL/AGAR DIL: CPT

## 2020-06-29 PROCEDURE — 87077 CULTURE AEROBIC IDENTIFY: CPT

## 2020-06-29 PROCEDURE — 87086 URINE CULTURE/COLONY COUNT: CPT

## 2020-06-29 PROCEDURE — 81001 URINALYSIS AUTO W/SCOPE: CPT

## 2020-07-01 LAB
ORGANISM: ABNORMAL
ORGANISM: ABNORMAL
URINE CULTURE, ROUTINE: ABNORMAL
URINE CULTURE, ROUTINE: ABNORMAL

## 2020-08-20 ENCOUNTER — HOSPITAL ENCOUNTER (OUTPATIENT)
Facility: HOSPITAL | Age: 70
Discharge: HOME | End: 2020-08-20
Payer: MEDICAID

## 2020-08-20 LAB
A/G RATIO: 1.1 (ref 0.8–2)
ALBUMIN SERPL-MCNC: 3.6 G/DL (ref 3.4–4.8)
ALP BLD-CCNC: 86 U/L (ref 25–100)
ALT SERPL-CCNC: 9 U/L (ref 4–36)
ANION GAP SERPL CALCULATED.3IONS-SCNC: 12 MMOL/L (ref 3–16)
AST SERPL-CCNC: 17 U/L (ref 8–33)
BACTERIA: ABNORMAL /HPF
BILIRUB SERPL-MCNC: 0.3 MG/DL (ref 0.3–1.2)
BILIRUBIN URINE: NEGATIVE
BLOOD, URINE: ABNORMAL
BUN BLDV-MCNC: 14 MG/DL (ref 6–20)
CALCIUM SERPL-MCNC: 8.9 MG/DL (ref 8.5–10.5)
CHLORIDE BLD-SCNC: 98 MMOL/L (ref 98–107)
CHOLESTEROL, TOTAL: 167 MG/DL (ref 0–200)
CLARITY: ABNORMAL
CO2: 25 MMOL/L (ref 20–30)
COLOR: YELLOW
CREAT SERPL-MCNC: 0.8 MG/DL (ref 0.4–1.2)
GFR AFRICAN AMERICAN: >59
GFR NON-AFRICAN AMERICAN: >60
GLOBULIN: 3.2 G/DL
GLUCOSE BLD-MCNC: 109 MG/DL (ref 74–106)
GLUCOSE URINE: NEGATIVE MG/DL
HCT VFR BLD CALC: 35.4 % (ref 37–47)
HDLC SERPL-MCNC: 42 MG/DL (ref 40–60)
HEMOGLOBIN: 11.9 G/DL (ref 11.5–16.5)
KETONES, URINE: NEGATIVE MG/DL
LDL CHOLESTEROL CALCULATED: 83 MG/DL
LDL CHOLESTEROL DIRECT: 87 MG/DL
LEUKOCYTE ESTERASE, URINE: ABNORMAL
MCH RBC QN AUTO: 32.5 PG (ref 27–32)
MCHC RBC AUTO-ENTMCNC: 33.6 G/DL (ref 31–35)
MCV RBC AUTO: 96.7 FL (ref 80–100)
MICROSCOPIC EXAMINATION: YES
NITRITE, URINE: NEGATIVE
PDW BLD-RTO: 13.3 % (ref 11–16)
PH UA: 5.5 (ref 5–8)
PHENYTOIN DOSE AMOUNT: ABNORMAL
PHENYTOIN LEVEL: 4.4 UG/ML (ref 10–20)
PLATELET # BLD: 351 K/UL (ref 150–400)
PMV BLD AUTO: 8.7 FL (ref 6–10)
POTASSIUM SERPL-SCNC: 4.5 MMOL/L (ref 3.4–5.1)
PROTEIN UA: 30 MG/DL
RBC # BLD: 3.66 M/UL (ref 3.8–5.8)
RBC UA: ABNORMAL /HPF (ref 0–4)
SODIUM BLD-SCNC: 135 MMOL/L (ref 136–145)
SPECIFIC GRAVITY UA: 1.02 (ref 1–1.03)
TOTAL PROTEIN: 6.8 G/DL (ref 6.4–8.3)
TRIGL SERPL-MCNC: 208 MG/DL (ref 0–249)
TSH SERPL DL<=0.05 MIU/L-ACNC: 2.29 UIU/ML (ref 0.35–5.5)
URINE REFLEX TO CULTURE: YES
URINE TYPE: ABNORMAL
UROBILINOGEN, URINE: 0.2 E.U./DL
VITAMIN D 25-HYDROXY: 13.5 (ref 32–100)
VLDLC SERPL CALC-MCNC: 42 MG/DL
WBC # BLD: 10 K/UL (ref 4–11)
WBC UA: >100 /HPF (ref 0–5)

## 2020-08-20 PROCEDURE — 80053 COMPREHEN METABOLIC PANEL: CPT

## 2020-08-20 PROCEDURE — 82306 VITAMIN D 25 HYDROXY: CPT

## 2020-08-20 PROCEDURE — 36415 COLL VENOUS BLD VENIPUNCTURE: CPT

## 2020-08-20 PROCEDURE — 87086 URINE CULTURE/COLONY COUNT: CPT

## 2020-08-20 PROCEDURE — 85027 COMPLETE CBC AUTOMATED: CPT

## 2020-08-20 PROCEDURE — 84443 ASSAY THYROID STIM HORMONE: CPT

## 2020-08-20 PROCEDURE — 80061 LIPID PANEL: CPT

## 2020-08-20 PROCEDURE — 80185 ASSAY OF PHENYTOIN TOTAL: CPT

## 2020-08-20 PROCEDURE — 81001 URINALYSIS AUTO W/SCOPE: CPT

## 2020-08-22 LAB — URINE CULTURE, ROUTINE: NORMAL

## 2021-11-14 ENCOUNTER — APPOINTMENT (OUTPATIENT)
Dept: ULTRASOUND IMAGING | Facility: HOSPITAL | Age: 71
End: 2021-11-14

## 2021-11-14 ENCOUNTER — HOSPITAL ENCOUNTER (EMERGENCY)
Facility: HOSPITAL | Age: 71
Discharge: HOME | End: 2021-11-14
Attending: EMERGENCY MEDICINE | Admitting: EMERGENCY MEDICINE

## 2021-11-14 VITALS
WEIGHT: 248 LBS | DIASTOLIC BLOOD PRESSURE: 63 MMHG | RESPIRATION RATE: 20 BRPM | HEART RATE: 89 BPM | SYSTOLIC BLOOD PRESSURE: 126 MMHG | HEIGHT: 62 IN | BODY MASS INDEX: 45.64 KG/M2 | TEMPERATURE: 98.3 F | OXYGEN SATURATION: 97 %

## 2021-11-14 DIAGNOSIS — N93.9 ABNORMAL UTERINE BLEEDING: Primary | ICD-10-CM

## 2021-11-14 DIAGNOSIS — D25.9 UTERINE LEIOMYOMA, UNSPECIFIED LOCATION: ICD-10-CM

## 2021-11-14 LAB
ALBUMIN SERPL-MCNC: 3.4 G/DL (ref 3.5–5.2)
ALBUMIN/GLOB SERPL: 0.7 G/DL
ALP SERPL-CCNC: 101 U/L (ref 39–117)
ALT SERPL W P-5'-P-CCNC: 14 U/L (ref 1–33)
ANION GAP SERPL CALCULATED.3IONS-SCNC: 10.2 MMOL/L (ref 5–15)
AST SERPL-CCNC: 26 U/L (ref 1–32)
BASOPHILS # BLD AUTO: 0.08 10*3/MM3 (ref 0–0.2)
BASOPHILS NFR BLD AUTO: 0.6 % (ref 0–1.5)
BILIRUB SERPL-MCNC: 0.2 MG/DL (ref 0–1.2)
BUN SERPL-MCNC: 20 MG/DL (ref 8–23)
BUN/CREAT SERPL: 27.4 (ref 7–25)
CALCIUM SPEC-SCNC: 9 MG/DL (ref 8.6–10.5)
CHLORIDE SERPL-SCNC: 99 MMOL/L (ref 98–107)
CO2 SERPL-SCNC: 26.8 MMOL/L (ref 22–29)
CREAT SERPL-MCNC: 0.73 MG/DL (ref 0.57–1)
DEPRECATED RDW RBC AUTO: 48.3 FL (ref 37–54)
EOSINOPHIL # BLD AUTO: 0.39 10*3/MM3 (ref 0–0.4)
EOSINOPHIL NFR BLD AUTO: 2.7 % (ref 0.3–6.2)
ERYTHROCYTE [DISTWIDTH] IN BLOOD BY AUTOMATED COUNT: 13.9 % (ref 12.3–15.4)
GFR SERPL CREATININE-BSD FRML MDRD: 79 ML/MIN/1.73
GLOBULIN UR ELPH-MCNC: 4.6 GM/DL
GLUCOSE SERPL-MCNC: 105 MG/DL (ref 65–99)
HCT VFR BLD AUTO: 35.7 % (ref 34–46.6)
HGB BLD-MCNC: 11.9 G/DL (ref 12–15.9)
IMM GRANULOCYTES # BLD AUTO: 0.1 10*3/MM3 (ref 0–0.05)
IMM GRANULOCYTES NFR BLD AUTO: 0.7 % (ref 0–0.5)
LYMPHOCYTES # BLD AUTO: 2.67 10*3/MM3 (ref 0.7–3.1)
LYMPHOCYTES NFR BLD AUTO: 18.4 % (ref 19.6–45.3)
MCH RBC QN AUTO: 31.6 PG (ref 26.6–33)
MCHC RBC AUTO-ENTMCNC: 33.3 G/DL (ref 31.5–35.7)
MCV RBC AUTO: 94.9 FL (ref 79–97)
MONOCYTES # BLD AUTO: 1.02 10*3/MM3 (ref 0.1–0.9)
MONOCYTES NFR BLD AUTO: 7 % (ref 5–12)
NEUTROPHILS NFR BLD AUTO: 10.23 10*3/MM3 (ref 1.7–7)
NEUTROPHILS NFR BLD AUTO: 70.6 % (ref 42.7–76)
NRBC BLD AUTO-RTO: 0 /100 WBC (ref 0–0.2)
PLATELET # BLD AUTO: 369 10*3/MM3 (ref 140–450)
PMV BLD AUTO: 8.2 FL (ref 6–12)
POTASSIUM SERPL-SCNC: 4.3 MMOL/L (ref 3.5–5.2)
PROT SERPL-MCNC: 8 G/DL (ref 6–8.5)
RBC # BLD AUTO: 3.76 10*6/MM3 (ref 3.77–5.28)
RBC MORPH BLD: NORMAL
SMALL PLATELETS BLD QL SMEAR: ADEQUATE
SODIUM SERPL-SCNC: 136 MMOL/L (ref 136–145)
WBC # BLD AUTO: 14.49 10*3/MM3 (ref 3.4–10.8)
WBC MORPH BLD: NORMAL

## 2021-11-14 PROCEDURE — 76830 TRANSVAGINAL US NON-OB: CPT

## 2021-11-14 PROCEDURE — 99283 EMERGENCY DEPT VISIT LOW MDM: CPT

## 2021-11-14 PROCEDURE — 85007 BL SMEAR W/DIFF WBC COUNT: CPT | Performed by: EMERGENCY MEDICINE

## 2021-11-14 PROCEDURE — 76856 US EXAM PELVIC COMPLETE: CPT

## 2021-11-14 PROCEDURE — 80053 COMPREHEN METABOLIC PANEL: CPT | Performed by: EMERGENCY MEDICINE

## 2021-11-14 PROCEDURE — 85025 COMPLETE CBC W/AUTO DIFF WBC: CPT | Performed by: EMERGENCY MEDICINE

## 2021-11-14 PROCEDURE — 96374 THER/PROPH/DIAG INJ IV PUSH: CPT

## 2021-11-14 PROCEDURE — 25010000002 DIAZEPAM PER 5 MG: Performed by: EMERGENCY MEDICINE

## 2021-11-14 RX ORDER — FUROSEMIDE 20 MG/1
40 TABLET ORAL DAILY
COMMUNITY

## 2021-11-14 RX ORDER — DIAZEPAM 5 MG/ML
2.5 INJECTION, SOLUTION INTRAMUSCULAR; INTRAVENOUS ONCE
Status: COMPLETED | OUTPATIENT
Start: 2021-11-14 | End: 2021-11-14

## 2021-11-14 RX ORDER — PHENYTOIN 50 MG/1
TABLET, CHEWABLE ORAL 3 TIMES DAILY
COMMUNITY

## 2021-11-14 RX ORDER — POTASSIUM CHLORIDE 20 MEQ/1
20 TABLET, EXTENDED RELEASE ORAL DAILY
COMMUNITY
End: 2022-03-12 | Stop reason: HOSPADM

## 2021-11-14 RX ORDER — LEVOTHYROXINE SODIUM 112 UG/1
112 TABLET ORAL DAILY
COMMUNITY

## 2021-11-14 RX ORDER — CARVEDILOL 12.5 MG/1
6.25 TABLET ORAL 2 TIMES DAILY WITH MEALS
COMMUNITY

## 2021-11-14 RX ORDER — LORATADINE 10 MG/1
10 TABLET ORAL DAILY
COMMUNITY

## 2021-11-14 RX ADMIN — DIAZEPAM 2.5 MG: 5 INJECTION, SOLUTION INTRAMUSCULAR; INTRAVENOUS at 20:47

## 2021-11-15 NOTE — ED PROVIDER NOTES
Subjective   71-year-old female presenting with vaginal bleeding.  She states that despite having gone through menopause she continues to have vaginal bleeding.  She states that it is monthly, can be heavy.  This time has been much heavier than usual.  No real abdominal pain, no fevers, chills, nausea, vomiting or other complaints.          Review of Systems   Constitutional: Negative.    HENT: Negative.    Eyes: Negative.    Respiratory: Negative.    Cardiovascular: Negative.    Gastrointestinal: Negative.    Genitourinary: Positive for vaginal bleeding.   Musculoskeletal: Negative.    Skin: Negative.    Neurological: Negative.    Psychiatric/Behavioral: Negative.        Past Medical History:   Diagnosis Date   • CHF (congestive heart failure) ([facility])    • Disease of thyroid gland        Allergies   Allergen Reactions   • Contrast Dye Anaphylaxis   • Aspirin Unknown - Low Severity   • Codeine Unknown - High Severity and Other (See Comments)   • Penicillins Unknown - Low Severity       Past Surgical History:   Procedure Laterality Date   • EYE SURGERY         No family history on file.    Social History     Socioeconomic History   • Marital status:    Tobacco Use   • Smoking status: Never Smoker   Substance and Sexual Activity   • Alcohol use: Never   • Drug use: Never           Objective   Physical Exam  Constitutional:       General: She is not in acute distress.     Appearance: Normal appearance. She is not ill-appearing, toxic-appearing or diaphoretic.   HENT:      Head: Normocephalic and atraumatic.      Right Ear: External ear normal.      Left Ear: External ear normal.      Nose: Nose normal.   Eyes:      Extraocular Movements: Extraocular movements intact.   Cardiovascular:      Rate and Rhythm: Normal rate and regular rhythm.      Pulses: Normal pulses.      Heart sounds: Normal heart sounds.   Pulmonary:      Effort: Pulmonary effort is normal. No respiratory distress.      Breath sounds: Normal  breath sounds.   Abdominal:      General: Bowel sounds are normal. There is no distension.      Tenderness: There is no abdominal tenderness.   Musculoskeletal:         General: No swelling, tenderness or deformity. Normal range of motion.      Cervical back: Normal range of motion.   Skin:     General: Skin is warm and dry.      Capillary Refill: Capillary refill takes less than 2 seconds.      Findings: No rash.   Neurological:      General: No focal deficit present.      Mental Status: She is alert and oriented to person, place, and time.   Psychiatric:         Mood and Affect: Mood normal.         Behavior: Behavior normal.         Procedures           ED Course                                           MDM  Number of Diagnoses or Management Options  Abnormal uterine bleeding  Uterine leiomyoma, unspecified location  Diagnosis management comments: 71-year-old female with vaginal bleeding.  Well-developed, well-nourished elderly lady in no distress with exam as above.  Her vital signs are normal.  Her exam is nonfocal.  Will obtain basic labs and pelvic ultrasound.  Disposition pending.    DDx: Abnormal uterine bleeding, anemia, malignancy    Blood work is without significant abnormality. Ultrasound reveals fibroids.  Will discharge home with GYN follow-up.       Amount and/or Complexity of Data Reviewed  Decide to obtain previous medical records or to obtain history from someone other than the patient: yes        Final diagnoses:   Abnormal uterine bleeding   Uterine leiomyoma, unspecified location          Raymon Landeros MD  11/14/21 [id number]

## 2021-11-15 NOTE — ED NOTES
Dr corcoran talked to patient and family about finding on the lab and us, all question answered     Fifi Eddy, RN  11/14/21 2125

## 2022-03-06 ENCOUNTER — APPOINTMENT (OUTPATIENT)
Dept: GENERAL RADIOLOGY | Facility: HOSPITAL | Age: 72
End: 2022-03-06

## 2022-03-06 ENCOUNTER — APPOINTMENT (OUTPATIENT)
Dept: CT IMAGING | Facility: HOSPITAL | Age: 72
End: 2022-03-06

## 2022-03-06 ENCOUNTER — HOSPITAL ENCOUNTER (INPATIENT)
Facility: HOSPITAL | Age: 72
LOS: 6 days | Discharge: HOSPICE | End: 2022-03-12
Attending: EMERGENCY MEDICINE | Admitting: HOSPITALIST

## 2022-03-06 DIAGNOSIS — N17.9 ACUTE RENAL FAILURE, UNSPECIFIED ACUTE RENAL FAILURE TYPE: Primary | ICD-10-CM

## 2022-03-06 DIAGNOSIS — Z51.5 HOSPICE CARE: ICD-10-CM

## 2022-03-06 DIAGNOSIS — G92.8 TOXIC METABOLIC ENCEPHALOPATHY: ICD-10-CM

## 2022-03-06 DIAGNOSIS — R19.00 PELVIC MASS: ICD-10-CM

## 2022-03-06 DIAGNOSIS — N39.0 URINARY TRACT INFECTION WITHOUT HEMATURIA, SITE UNSPECIFIED: ICD-10-CM

## 2022-03-06 DIAGNOSIS — E87.5 HYPERKALEMIA: ICD-10-CM

## 2022-03-06 DIAGNOSIS — N85.8 UTERINE MASS: ICD-10-CM

## 2022-03-06 DIAGNOSIS — N13.9 LOWER OBSTRUCTIVE UROPATHY: ICD-10-CM

## 2022-03-06 DIAGNOSIS — E72.20 HYPERAMMONEMIA: ICD-10-CM

## 2022-03-06 PROBLEM — G93.41 METABOLIC ENCEPHALOPATHY: Status: ACTIVE | Noted: 2022-03-06

## 2022-03-06 PROBLEM — N13.39 OTHER HYDRONEPHROSIS: Status: ACTIVE | Noted: 2022-03-06

## 2022-03-06 LAB
ALBUMIN SERPL-MCNC: 2.8 G/DL (ref 3.5–5.2)
ALBUMIN/GLOB SERPL: 0.5 G/DL
ALP SERPL-CCNC: 147 U/L (ref 39–117)
ALT SERPL W P-5'-P-CCNC: 43 U/L (ref 1–33)
AMMONIA BLD-SCNC: 108 UMOL/L (ref 11–51)
ANION GAP SERPL CALCULATED.3IONS-SCNC: 27.9 MMOL/L (ref 5–15)
AST SERPL-CCNC: 42 U/L (ref 1–32)
BASOPHILS # BLD AUTO: 0.13 10*3/MM3 (ref 0–0.2)
BASOPHILS NFR BLD AUTO: 0.4 % (ref 0–1.5)
BILIRUB SERPL-MCNC: 0.5 MG/DL (ref 0–1.2)
BUN SERPL-MCNC: 163 MG/DL (ref 8–23)
BUN/CREAT SERPL: 17.6 (ref 7–25)
CALCIUM SPEC-SCNC: 9.9 MG/DL (ref 8.6–10.5)
CHLORIDE SERPL-SCNC: 107 MMOL/L (ref 98–107)
CO2 SERPL-SCNC: 5.1 MMOL/L (ref 22–29)
CREAT SERPL-MCNC: 9.27 MG/DL (ref 0.57–1)
D-LACTATE SERPL-SCNC: 1.3 MMOL/L (ref 0.5–2)
DEPRECATED RDW RBC AUTO: 56 FL (ref 37–54)
EGFRCR SERPLBLD CKD-EPI 2021: 4.2 ML/MIN/1.73
EOSINOPHIL # BLD AUTO: 0.04 10*3/MM3 (ref 0–0.4)
EOSINOPHIL NFR BLD AUTO: 0.1 % (ref 0.3–6.2)
ERYTHROCYTE [DISTWIDTH] IN BLOOD BY AUTOMATED COUNT: 15.1 % (ref 12.3–15.4)
ETHANOL BLD-MCNC: <10 MG/DL (ref 0–10)
ETHANOL UR QL: <0.01 %
GLOBULIN UR ELPH-MCNC: 5.3 GM/DL
GLUCOSE BLDC GLUCOMTR-MCNC: 110 MG/DL (ref 70–130)
GLUCOSE BLDC GLUCOMTR-MCNC: 141 MG/DL (ref 70–130)
GLUCOSE BLDC GLUCOMTR-MCNC: 167 MG/DL (ref 70–130)
GLUCOSE BLDC GLUCOMTR-MCNC: 61 MG/DL (ref 70–130)
GLUCOSE SERPL-MCNC: 136 MG/DL (ref 65–99)
HCT VFR BLD AUTO: 30.1 % (ref 34–46.6)
HGB BLD-MCNC: 9.6 G/DL (ref 12–15.9)
HOLD SPECIMEN: NORMAL
HOLD SPECIMEN: NORMAL
IMM GRANULOCYTES # BLD AUTO: 0.52 10*3/MM3 (ref 0–0.05)
IMM GRANULOCYTES NFR BLD AUTO: 1.6 % (ref 0–0.5)
LYMPHOCYTES # BLD AUTO: 1.44 10*3/MM3 (ref 0.7–3.1)
LYMPHOCYTES NFR BLD AUTO: 4.5 % (ref 19.6–45.3)
MAGNESIUM SERPL-MCNC: 2.5 MG/DL (ref 1.6–2.4)
MCH RBC QN AUTO: 32.3 PG (ref 26.6–33)
MCHC RBC AUTO-ENTMCNC: 31.9 G/DL (ref 31.5–35.7)
MCV RBC AUTO: 101.3 FL (ref 79–97)
MONOCYTES # BLD AUTO: 1.42 10*3/MM3 (ref 0.1–0.9)
MONOCYTES NFR BLD AUTO: 4.4 % (ref 5–12)
NEUTROPHILS NFR BLD AUTO: 28.48 10*3/MM3 (ref 1.7–7)
NEUTROPHILS NFR BLD AUTO: 89 % (ref 42.7–76)
NRBC BLD AUTO-RTO: 0 /100 WBC (ref 0–0.2)
PHENYTOIN SERPL-MCNC: 0.8 MCG/ML (ref 10–20)
PLATELET # BLD AUTO: 347 10*3/MM3 (ref 140–450)
PMV BLD AUTO: 8.7 FL (ref 6–12)
POTASSIUM SERPL-SCNC: 8 MMOL/L (ref 3.5–5.2)
PROCALCITONIN SERPL-MCNC: 1.15 NG/ML (ref 0–0.25)
PROT SERPL-MCNC: 8.1 G/DL (ref 6–8.5)
RBC # BLD AUTO: 2.97 10*6/MM3 (ref 3.77–5.28)
RBC MORPH BLD: NORMAL
SARS-COV-2 RNA PNL SPEC NAA+PROBE: NOT DETECTED
SMALL PLATELETS BLD QL SMEAR: ADEQUATE
SODIUM SERPL-SCNC: 140 MMOL/L (ref 136–145)
TROPONIN T SERPL-MCNC: 0.26 NG/ML (ref 0–0.03)
WBC MORPH BLD: NORMAL
WBC NRBC COR # BLD: 32.03 10*3/MM3 (ref 3.4–10.8)
WHOLE BLOOD HOLD SPECIMEN: NORMAL
WHOLE BLOOD HOLD SPECIMEN: NORMAL

## 2022-03-06 PROCEDURE — 99285 EMERGENCY DEPT VISIT HI MDM: CPT

## 2022-03-06 PROCEDURE — 83735 ASSAY OF MAGNESIUM: CPT | Performed by: EMERGENCY MEDICINE

## 2022-03-06 PROCEDURE — 25010000002 VANCOMYCIN 1 G RECONSTITUTED SOLUTION: Performed by: EMERGENCY MEDICINE

## 2022-03-06 PROCEDURE — 82140 ASSAY OF AMMONIA: CPT | Performed by: EMERGENCY MEDICINE

## 2022-03-06 PROCEDURE — 80185 ASSAY OF PHENYTOIN TOTAL: CPT | Performed by: EMERGENCY MEDICINE

## 2022-03-06 PROCEDURE — 82962 GLUCOSE BLOOD TEST: CPT

## 2022-03-06 PROCEDURE — 70450 CT HEAD/BRAIN W/O DYE: CPT

## 2022-03-06 PROCEDURE — 25010000002 CALCIUM GLUCONATE PER 10 ML: Performed by: EMERGENCY MEDICINE

## 2022-03-06 PROCEDURE — 84145 PROCALCITONIN (PCT): CPT | Performed by: EMERGENCY MEDICINE

## 2022-03-06 PROCEDURE — 80053 COMPREHEN METABOLIC PANEL: CPT | Performed by: EMERGENCY MEDICINE

## 2022-03-06 PROCEDURE — 85025 COMPLETE CBC W/AUTO DIFF WBC: CPT | Performed by: EMERGENCY MEDICINE

## 2022-03-06 PROCEDURE — 25010000002 PIPERACILLIN SOD-TAZOBACTAM PER 1 G: Performed by: EMERGENCY MEDICINE

## 2022-03-06 PROCEDURE — 87040 BLOOD CULTURE FOR BACTERIA: CPT | Performed by: EMERGENCY MEDICINE

## 2022-03-06 PROCEDURE — 36415 COLL VENOUS BLD VENIPUNCTURE: CPT

## 2022-03-06 PROCEDURE — 82077 ASSAY SPEC XCP UR&BREATH IA: CPT | Performed by: EMERGENCY MEDICINE

## 2022-03-06 PROCEDURE — 94761 N-INVAS EAR/PLS OXIMETRY MLT: CPT

## 2022-03-06 PROCEDURE — 71250 CT THORAX DX C-: CPT

## 2022-03-06 PROCEDURE — 87635 SARS-COV-2 COVID-19 AMP PRB: CPT | Performed by: EMERGENCY MEDICINE

## 2022-03-06 PROCEDURE — 83605 ASSAY OF LACTIC ACID: CPT | Performed by: EMERGENCY MEDICINE

## 2022-03-06 PROCEDURE — 85007 BL SMEAR W/DIFF WBC COUNT: CPT | Performed by: EMERGENCY MEDICINE

## 2022-03-06 PROCEDURE — 84484 ASSAY OF TROPONIN QUANT: CPT | Performed by: EMERGENCY MEDICINE

## 2022-03-06 PROCEDURE — 94799 UNLISTED PULMONARY SVC/PX: CPT

## 2022-03-06 PROCEDURE — 99284 EMERGENCY DEPT VISIT MOD MDM: CPT

## 2022-03-06 PROCEDURE — 99223 1ST HOSP IP/OBS HIGH 75: CPT | Performed by: HOSPITALIST

## 2022-03-06 PROCEDURE — 71045 X-RAY EXAM CHEST 1 VIEW: CPT

## 2022-03-06 PROCEDURE — 74176 CT ABD & PELVIS W/O CONTRAST: CPT

## 2022-03-06 PROCEDURE — 63710000001 INSULIN REGULAR HUMAN PER 5 UNITS: Performed by: EMERGENCY MEDICINE

## 2022-03-06 PROCEDURE — 93005 ELECTROCARDIOGRAM TRACING: CPT | Performed by: EMERGENCY MEDICINE

## 2022-03-06 PROCEDURE — 51702 INSERT TEMP BLADDER CATH: CPT

## 2022-03-06 PROCEDURE — 94640 AIRWAY INHALATION TREATMENT: CPT

## 2022-03-06 RX ORDER — SODIUM CHLORIDE 0.9 % (FLUSH) 0.9 %
10 SYRINGE (ML) INJECTION AS NEEDED
Status: DISCONTINUED | OUTPATIENT
Start: 2022-03-06 | End: 2022-03-12 | Stop reason: HOSPADM

## 2022-03-06 RX ORDER — ALBUTEROL SULFATE 2.5 MG/3ML
SOLUTION RESPIRATORY (INHALATION)
Status: DISPENSED
Start: 2022-03-06 | End: 2022-03-07

## 2022-03-06 RX ORDER — CALCIUM GLUCONATE 94 MG/ML
1 INJECTION, SOLUTION INTRAVENOUS ONCE
Status: COMPLETED | OUTPATIENT
Start: 2022-03-06 | End: 2022-03-06

## 2022-03-06 RX ORDER — DEXTROSE MONOHYDRATE 25 G/50ML
50 INJECTION, SOLUTION INTRAVENOUS ONCE
Status: COMPLETED | OUTPATIENT
Start: 2022-03-06 | End: 2022-03-06

## 2022-03-06 RX ORDER — DEXTROSE MONOHYDRATE 25 G/50ML
INJECTION, SOLUTION INTRAVENOUS
Status: COMPLETED
Start: 2022-03-06 | End: 2022-03-06

## 2022-03-06 RX ORDER — LACTULOSE 10 G/15ML
10 SOLUTION ORAL 2 TIMES DAILY
Status: DISCONTINUED | OUTPATIENT
Start: 2022-03-07 | End: 2022-03-12 | Stop reason: HOSPADM

## 2022-03-06 RX ORDER — HEPARIN SODIUM 5000 [USP'U]/ML
5000 INJECTION, SOLUTION INTRAVENOUS; SUBCUTANEOUS EVERY 12 HOURS SCHEDULED
Status: DISCONTINUED | OUTPATIENT
Start: 2022-03-07 | End: 2022-03-12 | Stop reason: HOSPADM

## 2022-03-06 RX ORDER — CEFTRIAXONE 1 G/50ML
1 INJECTION, SOLUTION INTRAVENOUS EVERY 24 HOURS
Status: COMPLETED | OUTPATIENT
Start: 2022-03-07 | End: 2022-03-11

## 2022-03-06 RX ORDER — LACTULOSE 10 G/15ML
30 SOLUTION ORAL ONCE
Status: COMPLETED | OUTPATIENT
Start: 2022-03-06 | End: 2022-03-06

## 2022-03-06 RX ORDER — HEPARIN SODIUM 5000 [USP'U]/ML
5000 INJECTION, SOLUTION INTRAVENOUS; SUBCUTANEOUS EVERY 8 HOURS SCHEDULED
Status: DISCONTINUED | OUTPATIENT
Start: 2022-03-06 | End: 2022-03-06

## 2022-03-06 RX ORDER — ALBUTEROL SULFATE 2.5 MG/3ML
10 SOLUTION RESPIRATORY (INHALATION) ONCE
Status: COMPLETED | OUTPATIENT
Start: 2022-03-06 | End: 2022-03-06

## 2022-03-06 RX ADMIN — CALCIUM GLUCONATE 1 G: 98 INJECTION, SOLUTION INTRAVENOUS at 19:27

## 2022-03-06 RX ADMIN — HUMAN INSULIN 10 UNITS: 100 INJECTION, SOLUTION SUBCUTANEOUS at 19:27

## 2022-03-06 RX ADMIN — TAZOBACTAM SODIUM AND PIPERACILLIN SODIUM 3.38 G: 375; 3 INJECTION, SOLUTION INTRAVENOUS at 20:22

## 2022-03-06 RX ADMIN — DEXTROSE 50 % IN WATER (D50W) INTRAVENOUS SYRINGE 50 ML: at 21:26

## 2022-03-06 RX ADMIN — SODIUM CHLORIDE 2000 ML: 9 INJECTION, SOLUTION INTRAVENOUS at 19:28

## 2022-03-06 RX ADMIN — SODIUM CHLORIDE 1000 MG: 900 INJECTION, SOLUTION INTRAVENOUS at 20:23

## 2022-03-06 RX ADMIN — DEXTROSE MONOHYDRATE 50 ML: 25 INJECTION, SOLUTION INTRAVENOUS at 21:26

## 2022-03-06 RX ADMIN — ALBUTEROL SULFATE 10 MG: 2.5 SOLUTION RESPIRATORY (INHALATION) at 20:35

## 2022-03-06 RX ADMIN — Medication 50 ML: at 19:28

## 2022-03-06 RX ADMIN — LACTULOSE 30 G: 20 SOLUTION ORAL at 19:28

## 2022-03-06 RX ADMIN — SODIUM CHLORIDE 1000 ML: 9 INJECTION, SOLUTION INTRAVENOUS at 17:36

## 2022-03-06 NOTE — ED NOTES
Lab at  drawing labs.      Leticia Mayer RN  03/06/22 0360       Leticia Mayer RN  03/06/22 2712

## 2022-03-06 NOTE — ED PROVIDER NOTES
TRIAGE CHIEF COMPLAINT:     Nursing and triage notes reviewed    Chief Complaint   Patient presents with   • Altered Mental Status     EMS stated pts family called concerned that pt was more altered than usual. Stated pt has lost the will to eat and or drink x3 days.       HPI: Phyllis Chen is a 71 y.o. female who presents to the emergency department complaining of altered mental status and decreased appetite.  Patient brought in by EMS providers.  Family states patient is confused all the time but seems more confused over the past few days.  Has not wanted to eat or drink anything.  No report of fever, cough, vomiting, or diarrhea.  Patient denies any pain on arrival.  She denies shortness of breath or other symptoms currently.    REVIEW OF SYSTEMS: All other systems reviewed and are negative     PAST MEDICAL HISTORY:   Past Medical History:   Diagnosis Date   • CHF (congestive heart failure) ([facility])    • Disease of thyroid gland         FAMILY HISTORY:   No family history on file.     SOCIAL HISTORY:   Social History     Socioeconomic History   • Marital status:    Tobacco Use   • Smoking status: Never Smoker   Substance and Sexual Activity   • Alcohol use: Never   • Drug use: Never        SURGICAL HISTORY:   Past Surgical History:   Procedure Laterality Date   • EYE SURGERY          CURRENT MEDICATIONS:      Medication List      ASK your doctor about these medications    carvedilol 12.5 MG tablet  Commonly known as: COREG     furosemide 20 MG tablet  Commonly known as: LASIX     levothyroxine 112 MCG tablet  Commonly known as: SYNTHROID, LEVOTHROID     loratadine 10 MG tablet  Commonly known as: CLARITIN     MULTI-VITAMIN/IRON PO     phenytoin 50 MG chewable tablet  Commonly known as: DILANTIN     potassium chloride 20 MEQ CR tablet  Commonly known as: K-DUR,KLOR-CON     vitamin D3 125 MCG (5000 UT) capsule capsule             ALLERGIES: Contrast dye, Nsaids, Aspirin, Codeine, and Penicillins     PHYSICAL  EXAM:   VITAL SIGNS:   Vitals:    03/07/22 0029   BP: 138/99   Pulse: 87   Resp: 22   Temp: 93.9 °F (34.4 °C)   SpO2: 98%      CONSTITUTIONAL: Awake, confused, appears nontoxic   HENT: Atraumatic, normocephalic, oral mucosa pink and moist, airway patent. Nares patent without drainage. External ears normal.   EYES: Conjunctivae clear  NECK: Trachea midline   CARDIOVASCULAR: Normal heart rate, Normal rhythm, No murmurs, rubs, gallops   PULMONARY/CHEST: Clear to auscultation, no rhonchi, wheezes, or rales. Symmetrical breath sounds.  ABDOMINAL: Nondistended, soft, nontender - no rebound or guarding.  NEUROLOGIC: Nonfocal, moving all four extremities, no gross sensory or motor deficits.   EXTREMITIES: No clubbing, cyanosis, or edema   SKIN: Warm, Dry, No erythema, No rash     ED COURSE / MEDICAL DECISION MAKING:   Phyllis Chen is a 71 y.o. female who presents to the emergency department for evaluation of altered mental status.  Patient is altered on arrival but does not appear distressed.  She is resting comfortably in the stretcher.  She will attempt to answer questions but is somewhat confused.  She does follow simple commands and does not have any obvious acute neurological deficits.  Will obtain labs and imaging for further evaluation of her symptoms.    An EKG on arrival interpreted by me reveals sinus rhythm with rate of 90 bpm.  There are nonspecific ST and T wave changes.  This is an abnormal appearing EKG.    Chest x-ray per my interpretation does not reveal any obvious acute process.    CT scan of the head per radiology interpretation reveals an area potentially representing encephalomalacia in the right frontal lobe.  No other acute findings appreciated.    Patient had multiple laboratory abnormalities including white blood cell count of 32, procalcitonin of 1.1, troponin of 0.2, ammonia of 108, creatinine of 9, BUN of 163, potassium of 8, mildly elevated LFTs.    Patient was started on multiple liters of IV  fluids and antibiotics.  Will obtain CT scans of the chest, abdomen, and pelvis to try to assess for causes of infection.    Patient was given calcium, fluids, insulin, dextrose, albuterol for her hyperkalemia.    Patient was given lactulose for her elevated ammonia.        Critical care time for this encounter excluding any procedure: 120 minutes    Critical Care  Performed by: Fredi Ndiaye DO  Authorized by: Fredi Ndiaye DO     Critical care provider statement:     Critical care time (minutes): 120    Critical care time was exclusive of:  Separately billable procedures and treating other patients    Critical care was necessary to treat or prevent imminent or life-threatening deterioration of the following conditions: Acute renal failure, obstructive uropathy, hyperkalemia, altered mental status, toxic metabolic encephalopathy, likely large cancerous mass    Critical care was time spent personally by me on the following activities:  Ordering and performing treatments and interventions, development of treatment plan with patient or surrogate, discussions with consultants, evaluation of patient's response to treatment, examination of patient, ordering and review of laboratory studies, ordering and review of radiographic studies, pulse oximetry, re-evaluation of patient's condition and review of old charts        Fredi Ndiaye DO  I received the patient on signout from Dr. Elmore.  Patient presented to the ED for altered mental status and confusion.  Patient's initial work-up had multiple significant abnormalities.  She had acute renal failure with hyperkalemia.  IV fluid rehydration has been initiated and medical treatment for hyperkalemia have been started.  Patient also had hyper ammonemia and had received lactulose.  White blood cell count was elevated with an elevated procalcitonin Dr. Aguero started broad-spectrum antibiotics.  Urinalysis with eventually show an infection.  Nursing staff noted while  attempting to place Rivas catheter that she had a large necrotic mass coming out of the vaginal vault.  CT of the chest was negative for acute process.  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  This obstructive uropathy is likely the source of the patient's kidney function.  Did discuss with OB/GYN on-call, Dr. Camacho.  Given the size of the mass and the obstructive component it was felt that the tumor would not be appropriate for surgical intervention.  We will consult on the patient to help with planning and or tissue diagnosis.  Patient would likely require bilateral percutaneous nephrostomy tubes to relieve obstruction.  Will consult urology on inpatient basis.  Attempted possible transfer patient there are no beds at any of the tertiary care centers with an appropriate referral distancing.  Multiple discussion with the patient's son at bedside and over the phone.  Given the patient's very guarded and poor prognosis he was open to discussing goals of care and/or end-of-life care with palliative care/hospice.  Given the advanced disease and complications from her disease patient has a very poor prognosis. Son indicated that surgery had previously been offered for her pelvic mass but patient had refused stating she did not want surgery. They believed at that time that the mass was noncancerous. Unsure of last evaluation by OBGYN or when surgery had been discussed. Pts son in unsure if his mother would want hemodialysis or extreme interventions. Discussed with the hospitalist, patient will be admitted for continued medical management and goals of care discussions with appropriate consultations as needed. Son was agreeable with this plan at time of admission.       DECISION TO DISCHARGE/ADMIT: see ED care timeline     FINAL IMPRESSION:   1 --acute renal failure  2 --hyperkalemia  3 --obstructive  uropathy  4--large uterine mass  5--urinary tract infection  6--necrotic pelvic mass  7-hyperammonemia  8-SIRS  9-toxic metabolic encephalopathy    Electronically signed by: Desiree Elmore MD, 3/7/2022 03:02 Fredi Kaminski, DO  03/07/22 0302

## 2022-03-07 ENCOUNTER — APPOINTMENT (OUTPATIENT)
Dept: MRI IMAGING | Facility: HOSPITAL | Age: 72
End: 2022-03-07

## 2022-03-07 LAB
ALBUMIN SERPL-MCNC: 2.5 G/DL (ref 3.5–5.2)
ANION GAP SERPL CALCULATED.3IONS-SCNC: 24.1 MMOL/L (ref 5–15)
ANION GAP SERPL CALCULATED.3IONS-SCNC: 27.2 MMOL/L (ref 5–15)
BUN SERPL-MCNC: 153 MG/DL (ref 8–23)
BUN SERPL-MCNC: 154 MG/DL (ref 8–23)
BUN/CREAT SERPL: 16.9 (ref 7–25)
BUN/CREAT SERPL: 17.3 (ref 7–25)
CALCIUM SPEC-SCNC: 8.8 MG/DL (ref 8.6–10.5)
CALCIUM SPEC-SCNC: 8.9 MG/DL (ref 8.6–10.5)
CHLORIDE SERPL-SCNC: 112 MMOL/L (ref 98–107)
CHLORIDE SERPL-SCNC: 113 MMOL/L (ref 98–107)
CO2 SERPL-SCNC: 16.9 MMOL/L (ref 22–29)
CO2 SERPL-SCNC: 7.8 MMOL/L (ref 22–29)
CREAT SERPL-MCNC: 8.9 MG/DL (ref 0.57–1)
CREAT SERPL-MCNC: 9.05 MG/DL (ref 0.57–1)
EGFRCR SERPLBLD CKD-EPI 2021: 4.3 ML/MIN/1.73
EGFRCR SERPLBLD CKD-EPI 2021: 4.4 ML/MIN/1.73
GLUCOSE BLDC GLUCOMTR-MCNC: 171 MG/DL (ref 70–130)
GLUCOSE SERPL-MCNC: 120 MG/DL (ref 65–99)
GLUCOSE SERPL-MCNC: 136 MG/DL (ref 65–99)
PHOSPHATE SERPL-MCNC: 6.1 MG/DL (ref 2.5–4.5)
POTASSIUM SERPL-SCNC: 4.5 MMOL/L (ref 3.5–5.2)
POTASSIUM SERPL-SCNC: 5.7 MMOL/L (ref 3.5–5.2)
SODIUM SERPL-SCNC: 148 MMOL/L (ref 136–145)
SODIUM SERPL-SCNC: 153 MMOL/L (ref 136–145)

## 2022-03-07 PROCEDURE — 80069 RENAL FUNCTION PANEL: CPT | Performed by: INTERNAL MEDICINE

## 2022-03-07 PROCEDURE — 99233 SBSQ HOSP IP/OBS HIGH 50: CPT | Performed by: STUDENT IN AN ORGANIZED HEALTH CARE EDUCATION/TRAINING PROGRAM

## 2022-03-07 PROCEDURE — 25010000002 CEFTRIAXONE SODIUM-DEXTROSE 1-3.74 GM-%(50ML) RECONSTITUTED SOLUTION: Performed by: HOSPITALIST

## 2022-03-07 PROCEDURE — 70551 MRI BRAIN STEM W/O DYE: CPT

## 2022-03-07 PROCEDURE — 80048 BASIC METABOLIC PNL TOTAL CA: CPT | Performed by: INTERNAL MEDICINE

## 2022-03-07 PROCEDURE — 25010000002 HEPARIN (PORCINE) PER 1000 UNITS: Performed by: HOSPITALIST

## 2022-03-07 PROCEDURE — 82962 GLUCOSE BLOOD TEST: CPT

## 2022-03-07 RX ORDER — LOSARTAN POTASSIUM 100 MG/1
100 TABLET ORAL DAILY
COMMUNITY

## 2022-03-07 RX ORDER — ASPIRIN 300 MG/1
300 SUPPOSITORY RECTAL DAILY
Status: DISCONTINUED | OUTPATIENT
Start: 2022-03-07 | End: 2022-03-12 | Stop reason: HOSPADM

## 2022-03-07 RX ORDER — FERROUS SULFATE 325(65) MG
325 TABLET ORAL
COMMUNITY

## 2022-03-07 RX ORDER — SODIUM CHLORIDE 0.9 % (FLUSH) 0.9 %
10 SYRINGE (ML) INJECTION EVERY 12 HOURS SCHEDULED
Status: DISCONTINUED | OUTPATIENT
Start: 2022-03-07 | End: 2022-03-12 | Stop reason: HOSPADM

## 2022-03-07 RX ORDER — ATORVASTATIN CALCIUM 80 MG/1
80 TABLET, FILM COATED ORAL NIGHTLY
Status: DISCONTINUED | OUTPATIENT
Start: 2022-03-07 | End: 2022-03-12 | Stop reason: HOSPADM

## 2022-03-07 RX ORDER — PHENYTOIN 50 MG/1
50 TABLET, CHEWABLE ORAL EVERY 8 HOURS SCHEDULED
Status: DISCONTINUED | OUTPATIENT
Start: 2022-03-07 | End: 2022-03-12 | Stop reason: HOSPADM

## 2022-03-07 RX ORDER — FAMOTIDINE 20 MG/1
20 TABLET, FILM COATED ORAL 2 TIMES DAILY
COMMUNITY

## 2022-03-07 RX ORDER — ASPIRIN 325 MG
325 TABLET ORAL DAILY
Status: DISCONTINUED | OUTPATIENT
Start: 2022-03-07 | End: 2022-03-12 | Stop reason: HOSPADM

## 2022-03-07 RX ORDER — PHENYTOIN SODIUM 100 MG/1
500 CAPSULE, EXTENDED RELEASE ORAL NIGHTLY
COMMUNITY
End: 2022-03-12 | Stop reason: HOSPADM

## 2022-03-07 RX ORDER — MELATONIN
2000 DAILY
COMMUNITY

## 2022-03-07 RX ORDER — SODIUM POLYSTYRENE SULFONATE 15 G/60ML
120 SUSPENSION ORAL; RECTAL ONCE
Status: COMPLETED | OUTPATIENT
Start: 2022-03-07 | End: 2022-03-07

## 2022-03-07 RX ORDER — SODIUM POLYSTYRENE SULFONATE 15 G/60ML
60 SUSPENSION ORAL; RECTAL ONCE
Status: DISCONTINUED | OUTPATIENT
Start: 2022-03-07 | End: 2022-03-12 | Stop reason: HOSPADM

## 2022-03-07 RX ORDER — SODIUM CHLORIDE 0.9 % (FLUSH) 0.9 %
10 SYRINGE (ML) INJECTION AS NEEDED
Status: DISCONTINUED | OUTPATIENT
Start: 2022-03-07 | End: 2022-03-12 | Stop reason: HOSPADM

## 2022-03-07 RX ORDER — SODIUM BICARBONATE 650 MG/1
1300 TABLET ORAL 4 TIMES DAILY
Status: DISCONTINUED | OUTPATIENT
Start: 2022-03-07 | End: 2022-03-12 | Stop reason: HOSPADM

## 2022-03-07 RX ORDER — CARVEDILOL 12.5 MG/1
12.5 TABLET ORAL 2 TIMES DAILY WITH MEALS
Status: DISCONTINUED | OUTPATIENT
Start: 2022-03-07 | End: 2022-03-12 | Stop reason: HOSPADM

## 2022-03-07 RX ORDER — LEVOTHYROXINE SODIUM 112 UG/1
112 TABLET ORAL DAILY
Status: DISCONTINUED | OUTPATIENT
Start: 2022-03-07 | End: 2022-03-12 | Stop reason: HOSPADM

## 2022-03-07 RX ADMIN — SODIUM POLYSTYRENE SULFONATE 120 G: 15 SUSPENSION ORAL; RECTAL at 01:56

## 2022-03-07 RX ADMIN — ASPIRIN 300 MG: 300 SUPPOSITORY RECTAL at 22:58

## 2022-03-07 RX ADMIN — HEPARIN SODIUM 5000 UNITS: 5000 INJECTION, SOLUTION INTRAVENOUS; SUBCUTANEOUS at 10:26

## 2022-03-07 RX ADMIN — SODIUM BICARBONATE 150 MEQ: 84 INJECTION, SOLUTION INTRAVENOUS at 16:26

## 2022-03-07 RX ADMIN — SODIUM BICARBONATE 200 MEQ: 84 INJECTION INTRAVENOUS at 01:28

## 2022-03-07 RX ADMIN — SODIUM BICARBONATE 150 MEQ: 84 INJECTION, SOLUTION INTRAVENOUS at 01:42

## 2022-03-07 RX ADMIN — SODIUM BICARBONATE 150 MEQ: 84 INJECTION, SOLUTION INTRAVENOUS at 16:56

## 2022-03-07 RX ADMIN — Medication 10 ML: at 22:00

## 2022-03-07 RX ADMIN — CEFTRIAXONE 1 G: 1 INJECTION, SOLUTION INTRAVENOUS at 16:12

## 2022-03-07 RX ADMIN — HEPARIN SODIUM 5000 UNITS: 5000 INJECTION, SOLUTION INTRAVENOUS; SUBCUTANEOUS at 22:00

## 2022-03-07 NOTE — ED NOTES
Dr. Camacho called per Dr. Ndiaye with answer. Call transferred.      Palomo Martinez  03/06/22 4661

## 2022-03-07 NOTE — CONSULTS
[facility]      Nephrology Consultation      Referring Provider:   No ref. provider found    Reason for Consultation:  Acute Kidney Injury and associated problems.  .    Subjective:  Chief complaint   Chief Complaint   Patient presents with   • Altered Mental Status     EMS stated pts family called concerned that pt was more altered than usual. Stated pt has lost the will to eat and or drink x3 days.      History of present illness:    Patient is 72 yo female with multiple medical problems as listed below in the past medical history who presented to the ER from home with worsening mental status and increasing confusion per family. She has baseline confusion but reportedly has been worse the last few days and cannot eat or drink anything for the last 3 days. Upon evaluation in the emergency room was noted to have profound PHYLLIS with creatinine 8.9mg/dl, K 8, and . She was treated acutely for her hyperkalemia, with insulin and glucose.  It does appear that trying to transfer her to a tertiary care without any success.  CT abdomen without contrast is concerning for obstructing mass in the pelvis causing severe bilateral hydronephrosis.  Underlying cervical malignancy is the diagnosis of exclusion. Rivas catheter was attempted to be placed but unable due to size of malignancy. Urology and GYN also consulted. For full details on admission please see the H+P from the hospitalist which was reviewed by me.  It is uncertain if she has had any exposure to NSAID. Creatinine 11/2021 was normal 0.79mg/dl. Due to PHYLLIS, I was consulted for further evaluation and management. All history obtained from medical staff and chart due to patient clinical status.  I have reviewed labs/imaging/records from this hospitalization, including ER staff and admitting/attending physicians H/P's and progress notes to establish a comprehensive understanding of this patient's clinical hospital course, as well as to establish  plan of care appropriately.   Past Medical History:   Diagnosis Date   • CHF (congestive heart failure) (HCC)    • Disease of thyroid gland        Past Surgical History:   Procedure Laterality Date   • EYE SURGERY       No family history on file.      Social History     Tobacco Use   • Smoking status: Never Smoker   Substance Use Topics   • Alcohol use: Never   • Drug use: Never     Home medications:   Prior to Admission Medications     Prescriptions Last Dose Informant Patient Reported? Taking?    carvedilol (COREG) 12.5 MG tablet 3/5/2022  Yes Yes    Take  by mouth 2 (Two) Times a Day With Meals.    furosemide (LASIX) 20 MG tablet 3/5/2022  Yes Yes    Take  by mouth 2 (Two) Times a Day.    levothyroxine (SYNTHROID, LEVOTHROID) 112 MCG tablet 3/5/2022  Yes Yes    Take 112 mcg by mouth Daily.    loratadine (CLARITIN) 10 MG tablet 3/5/2022  Yes Yes    Take 10 mg by mouth Daily.    Multiple Vitamins-Iron (MULTI-VITAMIN/IRON PO) 3/5/2022  Yes Yes    Take  by mouth.    phenytoin (DILANTIN) 50 MG chewable tablet 3/6/2022  Yes Yes    Chew 3 (Three) Times a Day.    potassium chloride (K-DUR,KLOR-CON) 20 MEQ CR tablet   Yes Yes    Take 20 mEq by mouth 2 (Two) Times a Day.    vitamin D3 125 MCG (5000 UT) capsule capsule   Yes Yes    Take  by mouth Daily.        Emergency department medications:   Medications   sodium chloride 0.9 % flush 10 mL (has no administration in time range)   carvedilol (COREG) tablet 12.5 mg (has no administration in time range)   levothyroxine (SYNTHROID, LEVOTHROID) tablet 112 mcg (has no administration in time range)   phenytoin (DILANTIN) chewable tablet 50 mg (has no administration in time range)   heparin (porcine) 5000 UNIT/ML injection 5,000 Units (has no administration in time range)   cefTRIAXone (ROCEPHIN) IVPB 1 g/50ml dextrose (premix) (has no administration in time range)   lactulose (CHRONULAC) 10 GM/15ML solution 10 g (has no administration in time range)   sodium bicarbonate 8.4 %  "150 mEq in dextrose (D5W) 5 % 1,000 mL infusion (greater than 75 mEq) (150 mEq Intravenous New Bag 3/7/22 0142)   sodium polystyrene (KAYEXALATE) 15 GM/60ML suspension 60 g (60 g Oral Not Given 3/7/22 0216)   sodium chloride 0.9 % bolus 1,000 mL (0 mL Intravenous Stopped 3/6/22 1934)   vancomycin 1 g/250 mL 0.9% NS (vial-mate) (0 mg Intravenous Stopped 3/6/22 2217)   piperacillin-tazobactam (ZOSYN) 3.375 g in iso-osmotic dextrose 50 ml (premix) (0 g Intravenous Stopped 3/6/22 2110)   calcium gluconate injection 1 g (1 g Intravenous Given 3/6/22 1927)   albuterol (PROVENTIL) nebulizer solution 0.083% 2.5 mg/3mL ( Nebulization Not Given 3/7/22 0241)   insulin regular (humuLIN R,novoLIN R) injection 10 Units (10 Units Intravenous Given 3/6/22 1927)   dextrose (D50W) (25 g/50 mL) IV injection 50 mL (50 mL Intravenous Given 3/6/22 1928)   sodium chloride 0.9 % bolus 2,000 mL (0 mL Intravenous Stopped 3/6/22 2110)   lactulose (CHRONULAC) 10 GM/15ML solution 30 g (30 g Oral Given 3/6/22 1928)   dextrose (D50W) (25 g/50 mL) IV injection 50 mL (50 mL Intravenous Not Given 3/7/22 0242)   sodium bicarbonate injection 8.4% 200 mEq (200 mEq Intravenous Given 3/7/22 0128)   sodium polystyrene (KAYEXALATE) 15 GM/60ML suspension 120 g (120 g Rectal Given 3/7/22 0156)       Allergies:  Contrast dye, Nsaids, Aspirin, Codeine, and Penicillins    Review of Systems    Patient is unresponsive and no meaningful review of system is possible.    Objective:  Vital Signs  /67 (BP Location: Left arm, Patient Position: Lying)   Pulse 88   Temp 97.7 °F (36.5 °C) (Axillary)   Resp 18   Ht 162.6 cm (64\")   Wt 102 kg (225 lb 1.4 oz)   SpO2 97%   BMI 38.64 kg/m²          No intake/output data recorded.  No intake or output data in the 24 hours ending 03/07/22 0847    Physical Exam:  General Appearance:    I no acute distress.     Head:   Normocephalic, without obvious abnormality, atraumatic.     Eyes:      Normal, conjunctivae and " sclerae, no icterus, no pallor, corneas clear, PERRLA        Throat:   Oral mucosa dry      Neck:  No adenopathy, supple, trachea midline, no thyromegaly, no carotid bruit, no JVD        Lungs:    Clear to auscultation and fair air movement noted.      Heart::   Regular rhythm and normal rate, normal S1 and S2.       Abdomen:   Obese. Normal bowel sounds, no masses, no organomegaly, soft non-tender, non-distended, no guarding, no rebound tenderness      Genital urinary:   No urinary bladder palpable      Extremities:  Moves all extremities, no edema, no cyanosis, no redness.     Pulses:  Pulses palpable and equal bilaterally but weak.     Skin:  No bleeding, bruising or rash        Neurologic:  Cranial nerves grossly intact, move all extremities         Results Review:   Results from last 7 days   Lab Units 03/07/22  0246 03/06/22  1752   SODIUM mmol/L 148* 140   POTASSIUM mmol/L 5.7* 8.0*   CHLORIDE mmol/L 113* 107   CO2 mmol/L 7.8* 5.1*   BUN mg/dL 154* 163*   CREATININE mg/dL 8.90* 9.27*   CALCIUM mg/dL 8.9 9.9   ALBUMIN g/dL 2.50* 2.80*   BILIRUBIN mg/dL  --  0.5   ALK PHOS U/L  --  147*   ALT (SGPT) U/L  --  43*   AST (SGOT) U/L  --  42*   GLUCOSE mg/dL 136* 136*     Estimated Creatinine Clearance: 6.9 mL/min (A) (by C-G formula based on SCr of 8.9 mg/dL (H)).  Results from last 7 days   Lab Units 03/07/22  0246 03/06/22  1752   MAGNESIUM mg/dL  --  2.5*   PHOSPHORUS mg/dL 6.1*  --          Results from last 7 days   Lab Units 03/06/22  1752   WBC 10*3/mm3 32.03*   HEMOGLOBIN g/dL 9.6*   PLATELETS 10*3/mm3 347         Brief Urine Lab Results     None        No results found for: UTPCR  Imaging Results (Last 24 Hours)     Procedure Component Value Units Date/Time    XR Chest 1 View [[id number]] Collected: 03/07/22 0816     Updated: 03/07/22 0818    Narrative:      PROCEDURE: XR CHEST 1 VW-     HISTORY: Weak/Dizzy/AMS triage protocol     COMPARISON: None.     FINDINGS: The heart is normal in size. The mediastinum  is unremarkable.  The lungs are clear. There is no pneumothorax.  There are no acute  osseous abnormalities.       Impression:      No acute cardiopulmonary process.     Continued followup is recommended.     This report was signed and finalized on 3/7/2022 8:16 AM by Pauline Pearce M.D..    MRI Brain Without Contrast [[id number]] Collected: 03/07/22 0814     Updated: 03/07/22 0818    Narrative:      PROCEDURE: MRI BRAIN WO CONTRAST-     HISTORY: Transient ischemic attack (TIA); N17.9-Acute kidney failure,  unspecified; E87.5-Hyperkalemia; G92.8-Other toxic encephalopathy;  N85.8-Other specified noninflammatory disorders of uterus;  N13.9-Obstructive and reflux uropathy, unspecified; N39.0-Urinary tract  infection, site not specified; E72.20-Disorder of urea cycle metabolism,  unspecified     PROCEDURE: Multiplanar multisequence imaging of the brain was performed  without the use of intravenous contrast.     COMPARISON: None.     FINDINGS: There is generalized cerebral volume loss. There are FLAIR  hyperintensities in the periventricular white matter of both cerebral  hemispheres consistent with chronic small vessel ischemic change. There  is a punctate area of restricted diffusion in the posterior left frontal  lobe consistent with an acute infarct. There is no mass, mass effect or  midline shift. There is no hydrocephalus.     The midbrain, edgardo, cerebellum and craniocervical junction are  unremarkable. The sella and pituitary gland are within normal limits.  The major intracranial vasculature demonstrates the expected flow  related signal. The paranasal sinuses are clear.       Impression:      Punctate area of restricted diffusion in the posterior left  frontal lobe consistent with an acute infarct.           This report was signed and finalized on 3/7/2022 8:16 AM by Pauline Pearce M.D..    CT Abdomen Pelvis Without Contrast [[id number]] Collected: 03/06/22 2147     Updated: 03/06/22 2148    Narrative:       FINAL REPORT    TECHNIQUE:  Axial CT images were performed from the lung bases through the  symphysis pubis without IV contrast.  This study was performed  with techniques to keep radiation doses as low as reasonably  achievable (ALARA). Individualized dose reduction techniques  using automated exposure control or adjustment of mA and/or kV  according to the patient's size were employed.    CLINICAL HISTORY:  sepsis    FINDINGS:  Lack of intravenous contrast limits evaluation of solid  abdominal and pelvic organs.  ABDOMEN/PELVIS:  Liver,  gallbladder and bile ducts: The unenhanced liver is grossly  unremarkable without focal abnormality.  There has been a prior  cholecystectomy.  There is no definite biliary duct dilatation.  Adrenal glands: The adrenal glands are morphologically  unremarkable without suspicious lesion.  Kidneys, ureter and  urinary bladder: There is severe bilateral hydronephrosis to the  level of the urinary bladder trigone.  There appears to be an  ill-defined infiltrating lesion probably arising from the  uterine cervix.  There is urinary bladder wall thickening.  Spleen: The spleen is normal size.  Pancreas: The pancreas is  grossly unremarkable.  GI systems and mesentery: No evidence of  bowel obstruction.  The appendix is visualized and unremarkable  in appearance.  No significant mesenteric inflammation.  Lymph  nodes: No definite pathologically enlarged abdominal or pelvic  lymph nodes present within the limits of this noncontrast  enhanced exam.  Vessels: The abdominal aorta is normal in  caliber.  The inferior vena cava is unremarkable.  Peritoneum:  No free intraperitoneal fluid or pneumoperitoneum.  Pelvic  viscera: Refer to above.    Body wall: No body wall contusion.  Bones: No acute fracture.      Impression:      Findings are concerning for an obstructing mass in the pelvis  causing severe bilateral hydronephrosis.  Underlying cervical  malignancy is the diagnosis of  exclusion.    Authenticated by Reuben Farerll MD on 03/06/2022 09:47:12 PM    CT Chest Without Contrast Diagnostic [[id number]] Collected: 03/06/22 2147     Updated: 03/06/22 2148    Narrative:      FINAL REPORT    TECHNIQUE:  Axial CT images were obtained from the lung apex to the mid  abdomen.  Coronal and sagittal reformatted images generated from  the axial data set and provided for interpretation. This study  was performed with techniques to keep radiation doses as low as  reasonably achievable (ALARA). Individualized dose reduction  techniques using automated exposure control or adjustment of mA  and/or kV according to the patient's size were employed.    CLINICAL HISTORY:  sepsis    FINDINGS:  Chest:  Lungs/Pleura:  There is basal atelectasis.  Vessels:  The aorta and main pulmonary artery are normal in caliber.  Lymph nodes:  No pathologically enlarged thoracic lymph nodes.  Chest Wall:  No chest wall contusion.  Bones:  No acute  fracture.      Impression:      No acute findings in the chest to account for the patient's  symptoms.    Authenticated by Reuben Farrell MD on 03/06/2022 09:47:13 PM    CT Head Without Contrast [[id number]] Collected: 03/06/22 1900     Updated: 03/06/22 1901    Narrative:      FINAL REPORT    TECHNIQUE:  Multiple axial CT images were performed from the foramen magnum  to the vertex. This study was performed with techniques to keep  radiation doses as low as reasonably achievable (ALARA).  Individualized dose reduction techniques using automated  exposure control or adjustment of mA and/or kV according to the  patient's size were employed.    CLINICAL HISTORY:  AMS    FINDINGS:  There is a low-attenuation lesion present within the inferior  right frontal lobe.  No acute intracranial hemorrhage or large  acute cortical infarct.  The brain volume is normal for  patient's age.  Ventricles are normal in size and configuration.  No midline shift.  The basal cisterns are patent.  No  skull  fracture.  The visualized paranasal sinuses and mastoid air  cells are clear.      Impression:      Low attenuation lesion in the inferior right frontal lobe may  represent encephalomalacia.  This can be further evaluated with  MRI if clinically warranted..    Authenticated by Reuben Farrell MD on 03/06/2022 07:00:59 PM        carvedilol, 12.5 mg, Oral, BID With Meals  cefTRIAXone, 1 g, Intravenous, Q24H  heparin (porcine), 5,000 Units, Subcutaneous, Q12H  lactulose, 10 g, Oral, BID  levothyroxine, 112 mcg, Oral, Daily  phenytoin, 50 mg, Oral, Q8H  sodium polystyrene, 60 g, Oral, Once      sodium bicarbonate drip (greater than 75 mEq/bag), 150 mEq, Last Rate: 150 mEq (03/07/22 0142)        Assessment/Plan:    1.   Acute renal failure (HCC): Clearly it is secondary to obstructive complement from fairly large abdominal mass, patient has known about it and has decided not to pursue any further.  She had normal renal function few weeks ago and it all appears that this is acute.  Currently she does have significant hyperkalemia, metabolic acidosis.  2.   Metabolic encephalopathy: She is unresponsive at this point.  3.   Other hydronephrosis: Bilateral hydronephrosis secondary to obstruction, will need nephrostomy tubes.  4.   Pelvic mass: Likely has cervical cancer, and history of refusing any further work-up.  5.   Hyperammonemia (HCC):  6.  Acute left posterior frontal lobe stroke: Patient is unresponsive.    Risk and complexity: High    Plan:  · I had at length discussion with the , he said he will make more decisions once her son gets here.  At this point with her current situation she is not a candidate for dialysis.  She has fairly advanced metastatic cancer hospice will be appropriate.  If family wants nephrostomy tube will take care of the obstructive component and renal failure.  Although it will not make any difference and likely moving her to inpatient hospice and keeping her comfortable will be  fairly appropriate.  · Continue with rest of the current treatment plan and surveillance labs, it all depends how aggressive treatment plans are.  · Details were discussed with the  in the room.    · Details were also discussed with the hospitalist service and different plans discussed.  · Further recommendations will depend on clinical course of the patient during the current hospitalization.    · I also discussed the details with the nursing staff.  · Rest as ordered.    In closing, I sincerely appreciate opportunity to participate in care of this patient. If I can be of any further assistance with the management of this patient, please don’t hesitate to contact me.    Chito Segura MD    03/07/22  08:47 EST    Dictated using Dragon.

## 2022-03-07 NOTE — THERAPY EVALUATION
Attempted bedside eval of swallow, but unable this a.m. as pt. could not exhibit sufficient alertness with verbal and tactile stim by SLP as well as .  She exhibited poor awareness of her environment as well as persons present.  Will continue to follow through notes and f/u with RN for potential improvement for participation.

## 2022-03-07 NOTE — PLAN OF CARE
Pt presents to the ER for c/o confusion/AMS (increased the last few days) and decreased appetite.    Admit dx:  Acute renal failure; Hyperkalemia; Acute toxic metabolic encephalopathy; Hyperammonemia; Leukocytosis; Elevated troponin; Bilateral hydronephrosis; Obstructing pelvic mass    PMH:  HTN; Morbid obesity; Seizure disorder; Romulo LE lymphadema    Code status:  DNR/DNI    Patient does not have advanced directives.    Covid test negative on 3/6/22      Visited pt in her room; she was alone.  Pt appeared older than her stated age.  She was unable to respond to questions at this time.  She appeared to be tired but comfortable.    Called pt's home number ([phone]), son answered.  Dr. Alegria and I participated in the call using speaker phone.  Son reported that pt had been sick for a couple of years, but just got really bad 3 to 4 days ago.  He reported that she needed help with walking and going to the bathroom but was able to feed herself.  However, he reported that was one of the problems because she stopped eating or drinking.  He verbalized an understanding regarding pt's pelvic mass.  He reported that she has known about the mass for a few years, but was under the understanding that it was benign.  The son reported that the pt never wanted to go to the GYN and didn't want to have surgery, but stated that she didn't want to die.  He inquired if the mass was cancer and if it were inoperable a few times throughout the conversation.  Dr. Alegria explained that it was highly suspicious but going through surgery and biopsying the mass would be the only for sure way to know.  The son stated that this was very hard to process as pt has only been really sick for a few days. Dr. Alegria requested a family meeting with pt's  and son in person.  Son reported having a sister but she was not able to come to the hospital because she had small children.  Son was agreeable to meet in person tomorrow with his dad  present at 2:30 pm.  Son informed that okay to have his sister participated via speaker phone.      Code status discussed briefly as son stated that he felt for sure that the pt would not want to have ribs broken when doing CPR.  He confirmed pt to be a DNR/DNI.    Plan:  Family meeting Tuesday (3/8/22) at 2:30 pm.

## 2022-03-07 NOTE — ED NOTES
Dr. Bear called per Dr. Ndiaye with answer. Call transferred.      Palomo Martinez  03/06/22 7246

## 2022-03-07 NOTE — NURSING NOTE
House supervisor attempted to anchor f/c. Unable to advance f/c. MD aware. Orders for urology consult in AM.

## 2022-03-07 NOTE — H&P
[facility]      Name:  Phyllis Chen   Age:  71 y.o.  Sex:  female  :  1950  MRN:  [id number]   Visit Number:  [id number]  Admission Date:  3/6/2022  Date Of Service:  22  Primary Care Physician:  Hoa Del Rosario APRN      Chief Complaint:   Altered mental status, renal failure.       History Of Presenting Illness:    Ms. Phyllis Chen is a pleasant 70 yo F with HTN, Morbid obesity, Seizure disorder, Morbid obesity, Chronic bilateral lower extremity lymphedema.  Information obtained from chart review, attending ED physician.  The patient is confused and unable to contribute to clinical history.      The patient is brought into [facility] ED with complains of confusion/ altered mental status and decreased appetite. Family states patient is confused all the time but seems more confused over the past few days. Has not wanted to eat or drink anything.  No report of fever, cough, vomiting, or diarrhea.  Patient denies any pain on arrival.  She denies shortness of breath or other symptoms currently.    Work-up in the emergency room includes EKG 12-lead that showed sinus rhythm with rate of 90 bpm.  There are nonspecific ST and T wave changes.  Chest x-ray obtained does not reveal any obvious acute process.  CT head without contrast has shown low attenuation lesion in the inferior right frontal lobe may represent encephalomalacia. Patient had multiple laboratory abnormalities including white blood cell count of 32, procalcitonin of 1.1, troponin of 0.2, ammonia of 108, creatinine of 9, BUN of 163, potassium of 8, mildly elevated transaminitis.  CT head without contrast is with low-attenuation lesion in the inferior right frontal lobe may represent an encephalomalacia.  CT chest without contrast is with no acute findings in the chest to account for patient's symptoms.  CT abdomen without contrast is concerning for obstructing mass in the  pelvis causing severe bilateral hydronephrosis.  Underlying cervical malignancy is the diagnosis of exclusion.    The patient is diagnosed with Acute renal failure, Hyperkalemia, Acute toxic metabolic encephalopathy, Hyperammonemia, Leukocytosis, Elevated troponin, Bilateral hydronephrosis, Obstructing pelvic mass. Treatments in the emergency room include-administration of albuterol nebulizer 10 mg x 1, calcium gluconate 1 g x 1, dextrose 50 mL IV x 2, regular insulin 10 units IV x1, lactulose 30 gram PO X 1, Zosyn 3.375 g IV x1, Vancomycin 1 gram IV X 1, NACL 1000 ML X 1 bolus, NACL 2000 ML X 1 bolus. Attending ED provider has consulted hospital medicine for patient's admission.  Upon current evaluation, the patient is nontoxic-appearing, denies any acute medical concerns, her mental status is not at baseline/she appears to be disoriented.  Vital signs are currently stable and ongoing close clinical monitoring.      Review Of Systems:    A complete 12 point ROS is unable to be obtained from patient: Due to AMS.       Past Medical History: Patient  has a past medical history of HTN, Morbid obesity, Seizure disorder, Spinal stenosis/ LBP, Morbid obesity, Chronic bilateral lower extremity lymphedema.      Past Surgical History: Patient  has a past surgical history that includes Eye surgery.      Social History: Patient  reports that she has never smoked. She does not have any smokeless tobacco history on file. She reports that she does not drink alcohol and does not use drugs.      Family History: Patient's family history is not on file. Unable to obtain from patient.  Unable to obtain from attending medical staff or chart review at this time.      Allergies:      Contrast dye, Nsaids, Aspirin, Codeine, and Penicillins      Home Medications:    Prior to Admission Medications     Prescriptions Last Dose Informant Patient Reported? Taking?    carvedilol (COREG) 12.5 MG tablet   Yes No    Take  by mouth 2 (Two) Times a  Day With Meals.    furosemide (LASIX) 20 MG tablet   Yes No    Take  by mouth 2 (Two) Times a Day.    levothyroxine (SYNTHROID, LEVOTHROID) 112 MCG tablet   Yes No    Take 112 mcg by mouth Daily.    loratadine (CLARITIN) 10 MG tablet   Yes No    Take 10 mg by mouth Daily.    Multiple Vitamins-Iron (MULTI-VITAMIN/IRON PO)   Yes No    Take  by mouth.    phenytoin (DILANTIN) 50 MG chewable tablet   Yes No    Chew 3 (Three) Times a Day.    potassium chloride (K-DUR,KLOR-CON) 20 MEQ CR tablet   Yes No    Take 20 mEq by mouth 2 (Two) Times a Day.    vitamin D3 125 MCG (5000 UT) capsule capsule   Yes No    Take  by mouth Daily.          ED Medications:    Medications   sodium chloride 0.9 % flush 10 mL (has no administration in time range)   albuterol (PROVENTIL) (2.5 MG/3ML) 0.083% nebulizer solution  - ADS Override Pull (has no administration in time range)   sodium chloride 0.9 % bolus 1,000 mL (0 mL Intravenous Stopped 3/6/22 1934)   vancomycin 1 g/250 mL 0.9% NS (vial-mate) (1,000 mg Intravenous New Bag 3/6/22 2023)   piperacillin-tazobactam (ZOSYN) 3.375 g in iso-osmotic dextrose 50 ml (premix) (0 g Intravenous Stopped 3/6/22 2110)   calcium gluconate injection 1 g (1 g Intravenous Given 3/6/22 1927)   albuterol (PROVENTIL) nebulizer solution 0.083% 2.5 mg/3mL (10 mg Nebulization Given 3/6/22 2035)   insulin regular (humuLIN R,novoLIN R) injection 10 Units (10 Units Intravenous Given 3/6/22 1927)   dextrose (D50W) (25 g/50 mL) IV injection 50 mL (50 mL Intravenous Given 3/6/22 1928)   sodium chloride 0.9 % bolus 2,000 mL (0 mL Intravenous Stopped 3/6/22 2110)   lactulose (CHRONULAC) 10 GM/15ML solution 30 g (30 g Oral Given 3/6/22 1928)   dextrose (D50W) (25 g/50 mL) IV injection 50 mL (50 mL Intravenous Given 3/6/22 2126)       Vital Signs:  Temp:  [98 °F (36.7 °C)] 98 °F (36.7 °C)  Heart Rate:  [86-95] 95  Resp:  [16-22] 22  BP: (146-151)/(66-70) 151/66        03/06/22  1647   Weight: 112 kg (248 lb)     Body mass  "index is 42.57 kg/m².      Physical Exam:     Most recent vital Signs: /66   Pulse 95   Temp 98 °F (36.7 °C) (Oral)   Resp 22   Ht 162.6 cm (64\")   Wt 112 kg (248 lb)   SpO2 100%   BMI 42.57 kg/m²     Physical Exam  Vitals and nursing note reviewed.   Constitutional:       General: She is not in acute distress.     Appearance: She is not toxic-appearing.   HENT:      Head: Normocephalic and atraumatic.      Mouth/Throat:      Mouth: Mucous membranes are dry.   Eyes:      Extraocular Movements: Extraocular movements intact.      Conjunctiva/sclera: Conjunctivae normal.   Cardiovascular:      Rate and Rhythm: Normal rate and regular rhythm.      Heart sounds:     No friction rub. No gallop.   Pulmonary:      Effort: Pulmonary effort is normal. No respiratory distress.      Breath sounds: Normal breath sounds.   Abdominal:      General: Abdomen is flat. Bowel sounds are normal. There is no distension.      Palpations: Abdomen is soft.      Tenderness: There is no abdominal tenderness.   Musculoskeletal:         General: Normal range of motion.      Cervical back: Normal range of motion. No rigidity.   Skin:     General: Skin is warm.   Neurological:      General: No focal deficit present.      Mental Status: She is alert. She is disoriented.   Psychiatric:      Comments: Unable to evaluate further          Laboratory data:    I have reviewed the labs done in the emergency room.    Results from last 7 days   Lab Units 03/06/22  1752   SODIUM mmol/L 140   POTASSIUM mmol/L 8.0*   CHLORIDE mmol/L 107   CO2 mmol/L 5.1*   BUN mg/dL 163*   CREATININE mg/dL 9.27*   CALCIUM mg/dL 9.9   BILIRUBIN mg/dL 0.5   ALK PHOS U/L 147*   ALT (SGPT) U/L 43*   AST (SGOT) U/L 42*   GLUCOSE mg/dL 136*     Results from last 7 days   Lab Units 03/06/22  1752   WBC 10*3/mm3 32.03*   HEMOGLOBIN g/dL 9.6*   HEMATOCRIT % 30.1*   PLATELETS 10*3/mm3 347         Results from last 7 days   Lab Units 03/06/22  1752   TROPONIN T ng/mL 0.255* "                           Invalid input(s): USDES,  BLOODU, NITRITITE, BACT, EP    Pain Management Panel     Pain Management Panel Latest Ref Rng & Units 2/12/2020    AMPHETAMINES SCREEN, URINE Negative <1000 ng/mL Neg    COCAINE SCREEN, URINE Negative <300 ng/mL Neg          EKG:    Sinus rhythm with rate of 90 bpm.  There are nonspecific ST and T wave changes.        Radiology:    CT Head Without Contrast    Result Date: 3/6/2022  FINAL REPORT TECHNIQUE: Multiple axial CT images were performed from the foramen magnum to the vertex. This study was performed with techniques to keep radiation doses as low as reasonably achievable (ALARA). Individualized dose reduction techniques using automated exposure control or adjustment of mA and/or kV according to the patient's size were employed. CLINICAL HISTORY: AMS FINDINGS: There is a low-attenuation lesion present within the inferior right frontal lobe.  No acute intracranial hemorrhage or large acute cortical infarct.  The brain volume is normal for patient's age.  Ventricles are normal in size and configuration. No midline shift.  The basal cisterns are patent.  No skull fracture.  The visualized paranasal sinuses and mastoid air cells are clear.     Low attenuation lesion in the inferior right frontal lobe may represent encephalomalacia.  This can be further evaluated with MRI if clinically warranted.. Authenticated by Reuben Farrell MD on 03/06/2022 07:00:59 PM      Assessment:    Acute toxic metabolic encephalopathy, POA   Acute renal failure, POA   Bilateral hydronephrosis, POA   Obstructing pelvic mass, POA   Hyperkalemia, POA   Hyperammonemia, POA   Leukocytosis, POA  Elevated troponin, POA   HTN  Morbid obesity  Seizure disorder  Morbid obesity  Hypothyroidism   Chronic bilateral lower extremity lymphedema      Plan:    Acute toxic metabolic encephalopathy, POA   Possibly uremic encephalopathy due to acute renal failure.  Head CT without contrast ruled out stroke  is with low-attenuation lesion in the inferior right frontal lobe may represent an encephalomalacia. MRI brain w/o contrast will be requested. Continue to treat underlying renal failure. Monitor neuro status.      Acute renal failure, POA   S/P IV Fluids. Unable to place Rivas for decompression given has underlying obstructing pelvic mass.  Consult underway to nephrology and urology, appreciate their recommendations.  Monitor renal function.  Hold home regimen of Lasix.      Bilateral hydronephrosis, POA   As stated in the renal failure notes above, unable to place Rivas due to size of mass.  Consult underway to urology.      Obstructing pelvic mass, POA   Consult underway to Ob-Gyn Dr. Camacho, appreciate his follow up and recommendations.       Hyperkalemia, POA   S/P ED treatments. Monitor K to evaluate if needs further treatments. Of note patient is on Lactulose therapy anticipate with diarrhea from lactulose K will trend down.       Hyperammonemia, POA   Has been initiated on scheduled Lactulose therapy. Monitor ammonia level.       Leukocytosis, POA  Possibly contributed from underlying obstructing pelvic mass.  Nonetheless, rule out infection.  Blood cultures x2 obtained.  UA is negative.  Chest x-ray is negative. Patient is S/P 1 dose of IV Vancomycin and Zosyn in ED and will begun her on empiric IV Rocephin for now, titrate down/ de-escalate antibiotics as able.       Elevated troponin, POA   No acute angina or EKG changes. Elevated troponin is secondary to underlying renal failure.      HTN  Continue home Coreg if no hypotension. Monitor BP trends.       Morbid obesity  Current BMI 42.57. Recommend weight loss. Follow with PCP for long term management.       Seizure disorder  Stable. Continue home Phenytoin therapy.       Hypothyroidism   Continue home Levothyroxine.       Chronic bilateral lower extremity lymphedema  At baseline. Monitor for worsening fluid overload pattern.       Plan   Admit to  [facility] medicine for further care. Discussed with patient, her son at bedside, admitting ED physician Dr. Ndiaye.  Thank you for letting me assist with the care of this pleasant patient.      Risk Assessment: High.   DVT Prophylaxis: Heparin 5000 units SQ Q12 hour.   Code Status: Full (for now discussed with patients son).  Diet: NPO (until safe to swallow).      Advance Care Planning   ACP discussion was unable to be performed with patient during this visit due to AMS. Patient does not have an advance directive, information provided.      Ila Bear MD  03/06/22  21:30 EST    Dictated utilizing Dragon dictation.

## 2022-03-07 NOTE — PROGRESS NOTES
HOSPITALIST NOTE-    GOALS OF CARE DISCUSSION-    Patients son Mr. Ramón Chen [phone] had extensive conversation with ED physician Dr. Ndiaye today.  I have personally witnessed the conversation.      Extensive nature of obstructive/ necrotic pelvic mass was discussed with patient. Gyn physician earlier today also advised only conservative care. The patient's son added the patient in the past did not want additional treatment or therapies for this pelvic mass. The patients son is open to nephrostomy tube placements to relieve hydronephrosis.     It was discussed with patients family the poor prognosis is expected due to extensive nature of the pelvic mass. The patients son asked for no CPR, DNR but continued medical care to keep patient comfortable. The wishes will be respected and patients code status will be changed to DNR at this time with respect to patient's wishes represented by surrogate/son.     It was also discussed due to hyperkalemia if patient were to be recommended for dialysis, if they want to proceed with that. The patients son is yet to make decision on dialysis and will think and update the care team at some point/ when he makes that decision if wants to proceed with dialysis for renal/ hyperkalemia management. At this point, he has not made decision for HD if need be.    Additionally, discussions were made on palliative care consultation for the patient, the patient's son is in agreement, the consult has been placed and we will appreciate the recommendations from palliative care service.    All the above discussed with patient/son/Dr. Ndiaye (appreciate his assistance).

## 2022-03-07 NOTE — CASE MANAGEMENT/SOCIAL WORK
Discharge Planning Assessment   [facility]     Patient Name: Phyllis Chen  MRN: [id number]  Today's Date: 3/7/2022    Admit Date: 3/6/2022     Discharge Needs Assessment     Row Name 03/07/22 1248       Living Environment    Current Living Arrangements home    Potentially Unsafe Housing Conditions unable to assess    Primary Care Provided by spouse/significant other    Provides Primary Care For no one    Caregiving Concerns Unable to care for self    Family Caregiver if Needed none    Quality of Family Relationships supportive    Able to Return to Prior Arrangements no    Living Arrangement Comments Pt lives in a house with her  has 2 steps.  Does not ambulate, assisted to BSC from bed.  Had HH for a while but not a current pt.       Resource/Environmental Concerns    Resource/Environmental Concerns none       Transition Planning    Patient/Family Anticipates Transition to --  Family is indication they want comfort measures.  Will likely need Hospice.  Dr Chaudhari is to see pt today.       Discharge Needs Assessment    Readmission Within the Last 30 Days no previous admission in last 30 days    Equipment Currently Used at Home hospital bed;commode    Concerns to be Addressed discharge planning    Anticipated Changes Related to Illness inability to care for self    Equipment Needed After Discharge none    Provided Post Acute Provider List? N/A    N/A Provider List Comment No needs    Provided Post Acute Provider Quality & Resource List? N/A    N/A Quality & Resource List Comment No Needs    Current Discharge Risk chronically ill;dependent with mobility/activities of daily living               Discharge Plan     Row Name 03/07/22 0254       Plan    Plan Attempted to speak to pt in room but very drowsy, unable to converse. Not responding.   Ramón and  Nicholas was in room.  Spoke with  and permission granted to speak in front of .   is hard of hearing.  Lives in a one story house  "with .  Son Ramón is the POA and he has papers at home but they are not on chart. Instructed to bring in and give to Nurse to copy for the chart.   Son is in his car resting he has been up all night.  states he has been told pt may just \"live 2 days, has tumors.\" They are looking just providing \"comfort care.\"  DCP is uncertian at this time.  Spoke to Kareen May in Palliative Care and  is to meet with family at 2pm today.  Cm will continue to follow.              Continued Care and Services - Admitted Since 3/6/2022    Coordination has not been started for this encounter.          Demographic Summary     Row Name 03/07/22 1224       General Information    Admission Type inpatient    Arrived From emergency department    Expected Length of Stay (LOS) 1    Referral Source admission list    Reason for Consult discharge planning    Preferred Language English               Functional Status     Row Name 03/07/22 1232       Functional Status    Usual Activity Tolerance poor    Current Activity Tolerance poor    Functional Status Comments  states pt has not walked in a over a year       Functional Status, IADL    Medications completely dependent    Meal Preparation completely dependent    Housekeeping completely dependent    Laundry completely dependent    Shopping completely dependent    IADL Comments assistance of family       Mental Status    General Appearance WDL WDL       Mental Status Summary    Recent Changes in Mental Status/Cognitive Functioning unable to assess    Mental Status Comments Drowsy, unable to express needs                      Zaynab Spaulding RN    "

## 2022-03-07 NOTE — NURSING NOTE
Seen for wound consult. Noted to have Moisture associated skin damage/yeast to bilateral breast folds and abdominal folds. Recommend miconazole twice daily and prn. Buttocks with suspected deep tissue injury with some improvement from picture taken on admit. She is stooling frequently. Had to clean stool 3 times while assessing skin. Barrier creams applied each time.   Recommendations for care include a turning schedule, barrier cream twice daily and prn after cleansing, using dry sheets in folds if moisture noted, float heels off bed with pillows or heel lift boots as tolerated and encouraged, increase mobility as appropriate and tolerated to reduce pressure, for dry skin apply lotion/cream and a nutrition consult for dietary needs. Staff to contact provider and re-consult wound nurse for new skin issues or lack of improvement with current recommendations. Thank you for the consult. If you have questions or concerns do not hesitate to contact me.

## 2022-03-07 NOTE — PROGRESS NOTES
"    [facility]    Name:  Phyllis Chen   Age:  71 y.o.  Sex:  female  :  1950  MRN:  [id number]   Visit Number:  [id number]  Admission Date:  3/6/2022  Date Of Service:  22  Primary Care Physician:  Hoa Del Rosario APRN     LOS: 1 day :    Chief Complaint:      Altered mental status    Subjective:    Patient seen and examined at bedside this morning.  Chart reviewed and events noted.  Patient unresponsive to verbal stimulus.  Does withdrawal to painful stimulus.  No family at bedside during examination.    Hospital Course:    70 yo F with HTN, Morbid obesity, Seizure disorder, Morbid obesity, Chronic bilateral lower extremity lymphedema that presented to the hospital with altered mental status. CT head without contrast has shown low attenuation lesion in the inferior right frontal lobe may represent encephalomalacia. CT chest without contrast is with no acute findings in the chest to account for patient's symptoms.  CT abdomen without contrast is concerning for obstructing mass in the pelvis causing severe bilateral hydronephrosis.  Underlying cervical malignancy is the diagnosis of exclusion. Patient found to be in acute renal failure with hyperkalemia.     In the ED, gynecology Dr. Camacho discussed the case with ED physician: \"Given the size of the mass and the obstructive component it was felt that the tumor would not be appropriate for surgical intervention.  We will consult on the patient to help with planning and or tissue diagnosis.  Patient would likely require bilateral percutaneous nephrostomy tubes to relieve obstruction.  Will consult urology on inpatient basis.  Attempted possible transfer patient there are no beds at any of the tertiary care centers with an appropriate referral distancing.  Multiple discussion with the patient's son at bedside and over the phone.  Given the patient's very guarded and poor prognosis he was open to " "discussing goals of care and/or end-of-life care with palliative care/hospice.\" Nephrology consulted, urology consulted, hospice consulted, Gyn consulted.    Review of Systems:     All systems were reviewed and negative except as mentioned in subjective, assessment and plan.    Vital Signs:    Temp:  [93.9 °F (34.4 °C)-98.5 °F (36.9 °C)] 98.5 °F (36.9 °C)  Heart Rate:  [85-95] 87  Resp:  [18-22] 20  BP: (138-162)/() 151/85    Intake and output:    No intake/output data recorded.  No intake/output data recorded.    Physical Examination:    General Appearance:  Unresponsive to verbal stimulus, opens eyes to tactile stimulus. Toxic appearing. Chronically ill.   Head:  Atraumatic and normocephalic.   Eyes: Conjunctivae and sclerae normal, no icterus. No pallor.   Throat: No oral lesions, no thrush, oral mucosa moist.   Neck: Supple, trachea midline, no thyromegaly.   Lungs:   Breath sounds heard bilaterally equally.  No wheezing or crackles. No Pleural rub or bronchial breathing.   Heart:  Normal S1 and S2, no murmur, no gallop, no rub. No JVD.   Abdomen:   Normal bowel sounds, no masses, no organomegaly. Soft, nontender, nondistended, no rebound tenderness.   Extremities: Supple, no edema, no cyanosis, no clubbing.   Skin: No bleeding or rash.   Neurologic: Unresponsive to verbal stimulus. Withdraws to tactile stimulus. No facial asymmetry. No tremors.      Laboratory results:    Results from last 7 days   Lab Units 03/07/22  0246 03/06/22  1752   SODIUM mmol/L 148* 140   POTASSIUM mmol/L 5.7* 8.0*   CHLORIDE mmol/L 113* 107   CO2 mmol/L 7.8* 5.1*   BUN mg/dL 154* 163*   CREATININE mg/dL 8.90* 9.27*   CALCIUM mg/dL 8.9 9.9   BILIRUBIN mg/dL  --  0.5   ALK PHOS U/L  --  147*   ALT (SGPT) U/L  --  43*   AST (SGOT) U/L  --  42*   GLUCOSE mg/dL 136* 136*     Results from last 7 days   Lab Units 03/06/22  1752   WBC 10*3/mm3 32.03*   HEMOGLOBIN g/dL 9.6*   HEMATOCRIT % 30.1*   PLATELETS 10*3/mm3 347         Results " from last 7 days   Lab Units 03/06/22  1752   TROPONIN T ng/mL 0.255*             I have reviewed the patient's laboratory results.    Radiology results:    CT Abdomen Pelvis Without Contrast    Result Date: 3/6/2022  FINAL REPORT TECHNIQUE: Axial CT images were performed from the lung bases through the symphysis pubis without IV contrast.  This study was performed with techniques to keep radiation doses as low as reasonably achievable (ALARA). Individualized dose reduction techniques using automated exposure control or adjustment of mA and/or kV according to the patient's size were employed. CLINICAL HISTORY: sepsis FINDINGS: Lack of intravenous contrast limits evaluation of solid abdominal and pelvic organs.  ABDOMEN/PELVIS:  Liver, gallbladder and bile ducts: The unenhanced liver is grossly unremarkable without focal abnormality.  There has been a prior cholecystectomy.  There is no definite biliary duct dilatation. Adrenal glands: The adrenal glands are morphologically unremarkable without suspicious lesion.  Kidneys, ureter and urinary bladder: There is severe bilateral hydronephrosis to the level of the urinary bladder trigone.  There appears to be an ill-defined infiltrating lesion probably arising from the uterine cervix.  There is urinary bladder wall thickening. Spleen: The spleen is normal size.  Pancreas: The pancreas is grossly unremarkable.  GI systems and mesentery: No evidence of bowel obstruction.  The appendix is visualized and unremarkable in appearance.  No significant mesenteric inflammation.  Lymph nodes: No definite pathologically enlarged abdominal or pelvic lymph nodes present within the limits of this noncontrast enhanced exam.  Vessels: The abdominal aorta is normal in caliber.  The inferior vena cava is unremarkable.  Peritoneum: No free intraperitoneal fluid or pneumoperitoneum.  Pelvic viscera: Refer to above.    Body wall: No body wall contusion. Bones: No acute fracture.      Impression: Findings are concerning for an obstructing mass in the pelvis causing severe bilateral hydronephrosis.  Underlying cervical malignancy is the diagnosis of exclusion. Authenticated by Reuben Farrell MD on 03/06/2022 09:47:12 PM    CT Head Without Contrast    Result Date: 3/6/2022  FINAL REPORT TECHNIQUE: Multiple axial CT images were performed from the foramen magnum to the vertex. This study was performed with techniques to keep radiation doses as low as reasonably achievable (ALARA). Individualized dose reduction techniques using automated exposure control or adjustment of mA and/or kV according to the patient's size were employed. CLINICAL HISTORY: AMS FINDINGS: There is a low-attenuation lesion present within the inferior right frontal lobe.  No acute intracranial hemorrhage or large acute cortical infarct.  The brain volume is normal for patient's age.  Ventricles are normal in size and configuration. No midline shift.  The basal cisterns are patent.  No skull fracture.  The visualized paranasal sinuses and mastoid air cells are clear.     Impression: Low attenuation lesion in the inferior right frontal lobe may represent encephalomalacia.  This can be further evaluated with MRI if clinically warranted.. Authenticated by Reuben Farrell MD on 03/06/2022 07:00:59 PM    CT Chest Without Contrast Diagnostic    Result Date: 3/6/2022  FINAL REPORT TECHNIQUE: Axial CT images were obtained from the lung apex to the mid abdomen.  Coronal and sagittal reformatted images generated from the axial data set and provided for interpretation. This study was performed with techniques to keep radiation doses as low as reasonably achievable (ALARA). Individualized dose reduction techniques using automated exposure control or adjustment of mA and/or kV according to the patient's size were employed. CLINICAL HISTORY: sepsis FINDINGS: Chest:  Lungs/Pleura:  There is basal atelectasis.  Vessels: The aorta and main  pulmonary artery are normal in caliber. Lymph nodes:  No pathologically enlarged thoracic lymph nodes. Chest Wall:  No chest wall contusion.  Bones:  No acute fracture.     Impression: No acute findings in the chest to account for the patient's symptoms. Authenticated by Reuben Farrell MD on 03/06/2022 09:47:13 PM    MRI Brain Without Contrast    Result Date: 3/7/2022  PROCEDURE: MRI BRAIN WO CONTRAST-  HISTORY: Transient ischemic attack (TIA); N17.9-Acute kidney failure, unspecified; E87.5-Hyperkalemia; G92.8-Other toxic encephalopathy; N85.8-Other specified noninflammatory disorders of uterus; N13.9-Obstructive and reflux uropathy, unspecified; N39.0-Urinary tract infection, site not specified; E72.20-Disorder of urea cycle metabolism, unspecified  PROCEDURE: Multiplanar multisequence imaging of the brain was performed without the use of intravenous contrast.  COMPARISON: None.  FINDINGS: There is generalized cerebral volume loss. There are FLAIR hyperintensities in the periventricular white matter of both cerebral hemispheres consistent with chronic small vessel ischemic change. There is a punctate area of restricted diffusion in the posterior left frontal lobe consistent with an acute infarct. There is no mass, mass effect or midline shift. There is no hydrocephalus.  The midbrain, edgardo, cerebellum and craniocervical junction are unremarkable. The sella and pituitary gland are within normal limits. The major intracranial vasculature demonstrates the expected flow related signal. The paranasal sinuses are clear.      Impression: Punctate area of restricted diffusion in the posterior left frontal lobe consistent with an acute infarct.    This report was signed and finalized on 3/7/2022 8:16 AM by Pauline Pearce M.D..    XR Chest 1 View    Result Date: 3/7/2022  PROCEDURE: XR CHEST 1 VW-  HISTORY: Weak/Dizzy/AMS triage protocol  COMPARISON: None.  FINDINGS: The heart is normal in size. The mediastinum is  unremarkable. The lungs are clear. There is no pneumothorax.  There are no acute osseous abnormalities.      Impression: No acute cardiopulmonary process.  Continued followup is recommended.  This report was signed and finalized on 3/7/2022 8:16 AM by Pauline Pearce M.D..    I have reviewed the patient's radiology reports.    Medication Review:     I have reviewed the patient's active and prn medications.     Problem List:      Metabolic encephalopathy    Acute renal failure (HCC)    Other hydronephrosis    Pelvic mass    Hyperkalemia    Hyperammonemia (HCC)      Assessment:    1. Acute Metabolic Encephalopathy, POA  2. Acute Posterior Left Frontal Lobe Stroke, POA  3. Acute Renal Failure, POA  4. Severe, Bilateral Hydronephrosis, POA  5. Obstructing Pelvic Mass, POA  6. Hyperkalemia, POA  7. Hyperammonemia, POA  8. Hyperphosphatemia, POA  9. Elevated Troponin, POA  10. Essential Hypertension  11. Hyperlipidemia  12. Seizure Disorder  13. Hypothyroidism  14. Chronic Bilateral Lower Extremity Lymphedema  15. Morbid Obesity, BMI 39    Plan:    Acute encephalopathy  Acute left posterior frontal lobe stroke  -Patient encephalopathy continues to remain the same  -MRI revealed acute posterior left frontal lobe stroke  -Neurology has been called and discussed the patient's case  -Will begin rectal Aspirin and plan for daily Aspirin  -Atorvastatin ordered and will begin when able to tolerate po intake  -Echocardiogram ordered  -Continuous telemetry    Acute renal failure  Hyperkalemia  Obstructing pelvic mass  Severe, Bilateral Hydronephrosis  -Nephrology consulted, recommendations and assistance greatly appreciated  -Medically treating hyperkalemia at this time  -Urology consulted for possible urostomy tube placement and hicks catheter placement    Hyperphosphatemia  Hyperammonemia  Hyperkalemia  -Treat medically as above    -Patient has a very, very poor prognosis. Family has been talked to regarding the complexity of  her multisystem organ failure. They would like to speak with hospice/palliative care at this time.  -Dr. Alegria consulted. Assistance greatly appreciated.    DVT Prophylaxis: Heparin  Code Status: DNR/DNI  Diet: NPO  Discharge Plan: To be determined    Desiree Gurrola DO  03/07/22  17:56 EST    Dictated utilizing Dragon dictation.

## 2022-03-07 NOTE — CONSULTS
"Adult Nutrition  Assessment/PES    Patient Name:  Phyllis Chen  YOB: 1950  MRN: [id number]  Admit Date:  3/6/2022    Assessment Date:  3/7/2022    Comments:      Recommend:    1. Continue diet order as medically appropriate and tolerated.  2. Establish PO when medically appropriate and tolerated. If pt is NPO >3-5 days consider nutrition support.  3. Consider a MVI with minerals daily.  4. Continue to monitor and replace electrolytes PRN.     RD to follow pt and is available PRN.     Reason for Assessment     Row Name 03/07/22 1335          Reason for Assessment    Reason For Assessment per organizational policy;diagnosis/disease state;identified at risk by screening criteria;nurse/nurse practitioner consult (P)      Diagnosis endocrine conditions;cardiac disease;renal disease (P)      Identified At Risk by Screening Criteria MST SCORE 2+;large or nonhealing wound, burn or pressure injury;reduced oral intake over the last month (P)                   Anthropometrics     Row Name 03/07/22 1344          Anthropometrics    Height 162.6 cm (64\") (P)      Weight 102 kg (224 lb 13.9 oz) (P)   abw     Height for Calculation 1.626 m (5' 4\") (P)      Weight for Calculation 102 kg (224 lb 13.9 oz) (P)   abw            Ideal Body Weight (IBW)    Retired Ideal Body Weight (IBW) (kg) 55 (P)      Retired % Ideal Body Weight 185.44 (P)             Retired Body Mass Index (BMI)    Retired BMI (kg/m2) 38.68 (P)                 Labs/Tests/Procedures/Meds     Row Name 03/07/22 1339          Labs/Procedures/Meds    Lab Results Reviewed reviewed, pertinent (P)      Lab Results Comments High Na, Cl, K, glucose, BUN, creat, phos, Mg, ALT, ammonia, procalc. Low ALB (P)             Medications    Pertinent Medications Reviewed reviewed, pertinent (P)      Pertinent Medications Comments coreg, rocephin, heparin, lactulose, synthroid, Na bicarb, kayexalate, Na bicarb in D5W (P)                 Physical Findings     Row Name " "03/07/22 1342          Physical Findings    Overall Physical Appearance obese, missing teeth, 1+ trace edema in legs, 2+ mild edema in ankles and feet, bilateral gluteal pressure injury, right lower breast MASD, left lower breast MASD, bilateral anterior groin pressure injury (P)                 Estimated/Assessed Needs - Anthropometrics     Row Name 03/07/22 1344          Anthropometrics    Height 162.6 cm (64\") (P)      Weight 102 kg (224 lb 13.9 oz) (P)   abw     Height for Calculation 1.626 m (5' 4\") (P)      Weight for Calculation 102 kg (224 lb 13.9 oz) (P)   abw            Estimated/Assessed Needs    Additional Documentation KCAL/KG (Group);Estimated Calorie Needs (Group);Fluid Deficit (Group);Fluid Requirements (Group);Protein Requirements (Group) (P)             Estimated Calorie Needs    Estimated Calorie Requirement (kcal/day) 3000 (P)      Estimated Calorie Need Method kcal/kg (P)             KCAL/KG    KCAL/KG 25 Kcal/Kg (kcal);30 Kcal/Kg (kcal);35 Kcal/Kg (kcal) (P)      25 Kcal/Kg (kcal) 2550 (P)      30 Kcal/Kg (kcal) 3060 (P)      35 Kcal/Kg (kcal) 3570 (P)             Protein Requirements    Weight Used For Protein Calculations 102 kg (224 lb 13.9 oz) (P)   abw     Est Protein Requirement Amount (gms/kg) 1.3 gm protein (P)      Estimated Protein Requirements (gms/day) 132.6 (P)             Fluid Requirements    Estimated Fluid Requirement Method other (see comments) (P)   total output + 500 mL                Nutrition Prescription Ordered     Row Name 03/07/22 1347          Nutrition Prescription PO    Current PO Diet NPO;Sips/ice chips (P)             Nutrition Prescription PN    Dextrose Concentration (%) 5 % (P)      Dextrose (Kcal) 408 (P)                        Problem/Interventions:   Problem 1     Row Name 03/07/22 1350          Nutrition Diagnoses Problem 1    Problem 1 Increased Nutrient Needs (P)      Macronutrient Kcal;Protein (P)      Etiology (related to) Medical Diagnosis (P)      " Skin Pressure injury;Skin breakdown (P)      Signs/Symptoms (evidenced by) Report/Observation (P)      Reported/Observed By RD/Tech (P)                       Intervention Goal     Row Name 03/07/22 1351          Intervention Goal    General Maintain nutrition;Disease management/therapy;Reduce/improve symptoms;Improved nutrition related lab(s);Meet nutritional needs for age/condition (P)      PO Initiate feeding;Meet estimated needs;Establish PO;Tolerate PO (P)      Weight Maintain weight (P)                 Nutrition Intervention     Row Name 03/07/22 1351          Nutrition Intervention    RD/Tech Action Follow Tx progress;Care plan reviewd;Await begin PO (P)                 Nutrition Prescription     Row Name 03/07/22 1351          Nutrition Prescription PO    PO Prescription Other (comment) (P)   continue diet order as medically appropriate and tolerated     New PO Prescription Ordered? No, recommended (P)             Other Orders    Obtain Weight Daily (P)      Obtain Weight Ordered? No, recommended (P)      Supplement Vitamin mineral supplement (P)      Supplement Ordered? No, recommended (P)      Other continue to monitor and replace electrolytes PRN (P)                 Education/Evaluation     Row Name 03/07/22 9011          Education    Education Education not appropriate at this time (P)      Please explain Patient confusion (P)             Monitor/Evaluation    Monitor Per protocol;I&O;Pertinent labs;Weight;Skin status;Symptoms (P)                  Electronically signed by:  Viola Garza  03/07/22 13:52 EST

## 2022-03-07 NOTE — ED NOTES
Pt's POC glucose 61 at this time. Provider notified and orders received to give 1 amp of D50. Will continue to monitor closely.      Guadalupe Soliz RN  03/06/22 4778

## 2022-03-07 NOTE — PLAN OF CARE
Goal Outcome Evaluation:  Plan of Care Reviewed With: patient        Progress: no change  Outcome Evaluation: New admit, VSS, confused, awaiting urology and gynocology consults.

## 2022-03-08 ENCOUNTER — APPOINTMENT (OUTPATIENT)
Dept: CARDIOLOGY | Facility: HOSPITAL | Age: 72
End: 2022-03-08

## 2022-03-08 LAB
ALBUMIN SERPL-MCNC: 2.2 G/DL (ref 3.5–5.2)
ALBUMIN/GLOB SERPL: 0.6 G/DL
ALP SERPL-CCNC: 133 U/L (ref 39–117)
ALT SERPL W P-5'-P-CCNC: 42 U/L (ref 1–33)
ANION GAP SERPL CALCULATED.3IONS-SCNC: 25.1 MMOL/L (ref 5–15)
AST SERPL-CCNC: 38 U/L (ref 1–32)
BASOPHILS # BLD AUTO: 0.07 10*3/MM3 (ref 0–0.2)
BASOPHILS NFR BLD AUTO: 0.3 % (ref 0–1.5)
BILIRUB SERPL-MCNC: 0.3 MG/DL (ref 0–1.2)
BUN SERPL-MCNC: 150 MG/DL (ref 8–23)
BUN/CREAT SERPL: 16.3 (ref 7–25)
CALCIUM SPEC-SCNC: 8.4 MG/DL (ref 8.6–10.5)
CHLORIDE SERPL-SCNC: 109 MMOL/L (ref 98–107)
CHOLEST SERPL-MCNC: 101 MG/DL (ref 0–200)
CO2 SERPL-SCNC: 19.9 MMOL/L (ref 22–29)
CREAT SERPL-MCNC: 9.18 MG/DL (ref 0.57–1)
DEPRECATED RDW RBC AUTO: 47.2 FL (ref 37–54)
EGFRCR SERPLBLD CKD-EPI 2021: 4.3 ML/MIN/1.73
EOSINOPHIL # BLD AUTO: 0.12 10*3/MM3 (ref 0–0.4)
EOSINOPHIL NFR BLD AUTO: 0.4 % (ref 0.3–6.2)
ERYTHROCYTE [DISTWIDTH] IN BLOOD BY AUTOMATED COUNT: 14.5 % (ref 12.3–15.4)
GLOBULIN UR ELPH-MCNC: 4 GM/DL
GLUCOSE BLDC GLUCOMTR-MCNC: 136 MG/DL (ref 70–130)
GLUCOSE BLDC GLUCOMTR-MCNC: 138 MG/DL (ref 70–130)
GLUCOSE BLDC GLUCOMTR-MCNC: 145 MG/DL (ref 70–130)
GLUCOSE BLDC GLUCOMTR-MCNC: 150 MG/DL (ref 70–130)
GLUCOSE SERPL-MCNC: 133 MG/DL (ref 65–99)
HBA1C MFR BLD: 4.6 % (ref 4.8–5.6)
HCT VFR BLD AUTO: 22 % (ref 34–46.6)
HDLC SERPL-MCNC: 19 MG/DL (ref 40–60)
HGB BLD-MCNC: 7.9 G/DL (ref 12–15.9)
IMM GRANULOCYTES # BLD AUTO: 0.27 10*3/MM3 (ref 0–0.05)
IMM GRANULOCYTES NFR BLD AUTO: 1 % (ref 0–0.5)
LDLC SERPL CALC-MCNC: 56 MG/DL (ref 0–100)
LDLC/HDLC SERPL: 2.79 {RATIO}
LYMPHOCYTES # BLD AUTO: 2.18 10*3/MM3 (ref 0.7–3.1)
LYMPHOCYTES NFR BLD AUTO: 7.9 % (ref 19.6–45.3)
MCH RBC QN AUTO: 32.5 PG (ref 26.6–33)
MCHC RBC AUTO-ENTMCNC: 35.9 G/DL (ref 31.5–35.7)
MCV RBC AUTO: 90.5 FL (ref 79–97)
MONOCYTES # BLD AUTO: 1.57 10*3/MM3 (ref 0.1–0.9)
MONOCYTES NFR BLD AUTO: 5.7 % (ref 5–12)
NEUTROPHILS NFR BLD AUTO: 23.3 10*3/MM3 (ref 1.7–7)
NEUTROPHILS NFR BLD AUTO: 84.7 % (ref 42.7–76)
NRBC BLD AUTO-RTO: 0 /100 WBC (ref 0–0.2)
PLATELET # BLD AUTO: 264 10*3/MM3 (ref 140–450)
PMV BLD AUTO: 9 FL (ref 6–12)
POTASSIUM SERPL-SCNC: 4.2 MMOL/L (ref 3.5–5.2)
PROT SERPL-MCNC: 6.2 G/DL (ref 6–8.5)
RBC # BLD AUTO: 2.43 10*6/MM3 (ref 3.77–5.28)
SODIUM SERPL-SCNC: 154 MMOL/L (ref 136–145)
TRIGL SERPL-MCNC: 145 MG/DL (ref 0–150)
VLDLC SERPL-MCNC: 26 MG/DL (ref 5–40)
WBC NRBC COR # BLD: 27.51 10*3/MM3 (ref 3.4–10.8)

## 2022-03-08 PROCEDURE — 80061 LIPID PANEL: CPT | Performed by: STUDENT IN AN ORGANIZED HEALTH CARE EDUCATION/TRAINING PROGRAM

## 2022-03-08 PROCEDURE — 99498 ADVNCD CARE PLAN ADDL 30 MIN: CPT | Performed by: INTERNAL MEDICINE

## 2022-03-08 PROCEDURE — 93306 TTE W/DOPPLER COMPLETE: CPT | Performed by: INTERNAL MEDICINE

## 2022-03-08 PROCEDURE — 83036 HEMOGLOBIN GLYCOSYLATED A1C: CPT | Performed by: STUDENT IN AN ORGANIZED HEALTH CARE EDUCATION/TRAINING PROGRAM

## 2022-03-08 PROCEDURE — 99222 1ST HOSP IP/OBS MODERATE 55: CPT | Performed by: PSYCHIATRY & NEUROLOGY

## 2022-03-08 PROCEDURE — 80053 COMPREHEN METABOLIC PANEL: CPT | Performed by: STUDENT IN AN ORGANIZED HEALTH CARE EDUCATION/TRAINING PROGRAM

## 2022-03-08 PROCEDURE — 25010000002 HEPARIN (PORCINE) PER 1000 UNITS: Performed by: HOSPITALIST

## 2022-03-08 PROCEDURE — 25010000002 CEFTRIAXONE SODIUM-DEXTROSE 1-3.74 GM-%(50ML) RECONSTITUTED SOLUTION: Performed by: HOSPITALIST

## 2022-03-08 PROCEDURE — 99497 ADVNCD CARE PLAN 30 MIN: CPT | Performed by: INTERNAL MEDICINE

## 2022-03-08 PROCEDURE — 85025 COMPLETE CBC W/AUTO DIFF WBC: CPT | Performed by: STUDENT IN AN ORGANIZED HEALTH CARE EDUCATION/TRAINING PROGRAM

## 2022-03-08 PROCEDURE — 82962 GLUCOSE BLOOD TEST: CPT

## 2022-03-08 PROCEDURE — 99233 SBSQ HOSP IP/OBS HIGH 50: CPT | Performed by: FAMILY MEDICINE

## 2022-03-08 RX ADMIN — CEFTRIAXONE 1 G: 1 INJECTION, SOLUTION INTRAVENOUS at 13:17

## 2022-03-08 RX ADMIN — HEPARIN SODIUM 5000 UNITS: 5000 INJECTION, SOLUTION INTRAVENOUS; SUBCUTANEOUS at 22:00

## 2022-03-08 RX ADMIN — SODIUM BICARBONATE: 84 INJECTION, SOLUTION INTRAVENOUS at 15:55

## 2022-03-08 RX ADMIN — SODIUM BICARBONATE: 84 INJECTION, SOLUTION INTRAVENOUS at 16:39

## 2022-03-08 RX ADMIN — SODIUM BICARBONATE 150 MEQ: 84 INJECTION, SOLUTION INTRAVENOUS at 04:41

## 2022-03-08 RX ADMIN — Medication 10 ML: at 22:00

## 2022-03-08 RX ADMIN — HEPARIN SODIUM 5000 UNITS: 5000 INJECTION, SOLUTION INTRAVENOUS; SUBCUTANEOUS at 09:24

## 2022-03-08 NOTE — THERAPY DISCHARGE NOTE
Pt not appropriate for skilled OT d/t pt being d/c to hospice per Dr. Escoto.  OT will sign off at this time.

## 2022-03-08 NOTE — CONSULTS
Nutrition Services    Patient Name:  Phyllis Chen  YOB: 1950  MRN: [id number]  Admit Date:  3/6/2022    Patient not appropriate for diabetes education at this time. Patient being d/c to hospice care.    Electronically signed by:  Sangita Corley RD  03/08/22 11:48 EST

## 2022-03-08 NOTE — PLAN OF CARE
"Attempted to see patient, but U/S was being performed.  Dr. Alegria and I met with pt's , son and daughter via speaker phone in third floor waiting room.  Information discussed in yesterday's phone conversation with son readdressed.  Clinical updates provided to include GYN did not recommend surgery and all family's questions were answered re pelvic mass based on available information.  They were informed that it was suspicious of cancer but surgery/biopsy would be the only way to confirm.      Family asked \"where do we go from here\"?  Patient's wishes were discussed.  Family agreed that patient would not want any aggressive treatment as she did not want surgery in the past. Home hospice care discussed.  Daughter and her  was leaning toward having the patient at home with comfort care.  Son and dad did not appear to be fully decided at this time.   They were encouraged to discuss pt's plan of care together as a family.  Son asked about care of nephrotomy tubes.  He was informed that information would have to be reviewed as it did not appear that any had been placed on this time. However, he was informed that hospice would be able to provide care as needed.  Palliative services will plan to f/u tomorrow.    Called [facility]; spoke to Nik.  Tentative home hospice referral was given.  She will pull needed info from Epic.                                 "

## 2022-03-08 NOTE — THERAPY DISCHARGE NOTE
Pt not appropriate for skilled PT eval, Pt being discharged to hospice care. PT to sign off.

## 2022-03-08 NOTE — PROGRESS NOTES
[facility]"" Progress Note  [facility]. [place]        Patient Name: Phyllis Chen  : 1950  MRN: [id number]   LOS: 2 days    Patient Care Team:  Hoa Del Rosario APRN as PCP - General (Family Medicine)    Chief Complaint:    Chief Complaint   Patient presents with   • Altered Mental Status     EMS stated pts family called concerned that pt was more altered than usual. Stated pt has lost the will to eat and or drink x3 days.      Primary Care Physician:  Hoa Del oRsario APRN  Date of admission: 3/6/2022    Subjective     Interval History:   Events noted from last 24 hours.  Patient is slightly more awake and cannot answer questions appropriately.  Randomly moving extremities.  Family meeting is scheduled for later today to make the final decisions about her long-term care.  Although from all previous records she never wanted to be aggressive for anything.    Review of Systems:   As noted above    Objective     Vitals:   Temp:  [97.6 °F (36.4 °C)-98.5 °F (36.9 °C)] 97.6 °F (36.4 °C)  Heart Rate:  [73-94] 73  Resp:  [20] 20  BP: (140-157)/(69-85) 147/69    Intake/Output Summary (Last 24 hours) at 3/8/2022 0819  Last data filed at 3/8/2022 0441  Gross per 24 hour   Intake 2508.18 ml   Output --   Net 2508.18 ml       Physical Exam:    General Appearance: alert, no acute distress   Skin: warm and dry  HEENT: oral mucosa normal, nonicteric sclera  Neck: supple, no JVD  Lungs: CTA  Heart: RRR, normal S1 and S2  Abdomen: soft, tender, nondistended  : no palpable bladder  Extremities: no edema, cyanosis or clubbing  Neuro: normal speech and mental status     Scheduled Meds:     Current Facility-Administered Medications   Medication Dose Route Frequency Provider Last Rate Last Admin   • aspirin tablet 325 mg  325 mg Oral Daily Desiree Gurrola DO        Or   • aspirin suppository 300 mg  300 mg Rectal Daily Desiere Gurrola DO   300 mg at 22 6210   •  atorvastatin (LIPITOR) tablet 80 mg  80 mg Oral Nightly Desiree Gurrola DO       • carvedilol (COREG) tablet 12.5 mg  12.5 mg Oral BID With Meals Ila Bear MD       • cefTRIAXone (ROCEPHIN) IVPB 1 g/50ml dextrose (premix)  1 g Intravenous Q24H Ila Bear  mL/hr at 03/07/22 1612 1 g at 03/07/22 1612   • heparin (porcine) 5000 UNIT/ML injection 5,000 Units  5,000 Units Subcutaneous Q12H Ila Bear MD   5,000 Units at 03/07/22 2200   • lactulose (CHRONULAC) 10 GM/15ML solution 10 g  10 g Oral BID Ila Bear MD       • levothyroxine (SYNTHROID, LEVOTHROID) tablet 112 mcg  112 mcg Oral Daily Ila Bear MD       • phenytoin (DILANTIN) chewable tablet 50 mg  50 mg Oral Q8H Ila Bear MD       • sodium bicarbonate 8.4 % 150 mEq in dextrose (D5W) 5 % 1,000 mL infusion (greater than 75 mEq)  150 mEq Intravenous Continuous Chito Segura  mL/hr at 03/08/22 0441 150 mEq at 03/08/22 0441   • sodium bicarbonate tablet 1,300 mg  1,300 mg Oral 4x Daily Chito Segura MD       • sodium chloride 0.9 % flush 10 mL  10 mL Intravenous PRN Desiree Elmore MD       • sodium chloride 0.9 % flush 10 mL  10 mL Intravenous Q12H Desiree Gurrola DO   10 mL at 03/07/22 2200   • sodium chloride 0.9 % flush 10 mL  10 mL Intravenous PRN Desiree Gurrola DO       • sodium polystyrene (KAYEXALATE) 15 GM/60ML suspension 60 g  60 g Oral Once Chito Segura MD           aspirin, 325 mg, Oral, Daily   Or  aspirin, 300 mg, Rectal, Daily  atorvastatin, 80 mg, Oral, Nightly  carvedilol, 12.5 mg, Oral, BID With Meals  cefTRIAXone, 1 g, Intravenous, Q24H  heparin (porcine), 5,000 Units, Subcutaneous, Q12H  lactulose, 10 g, Oral, BID  levothyroxine, 112 mcg, Oral, Daily  phenytoin, 50 mg, Oral, Q8H  sodium bicarbonate, 1,300 mg, Oral, 4x Daily  sodium chloride, 10 mL, Intravenous, Q12H  sodium polystyrene, 60 g, Oral, Once        IV Meds:   sodium bicarbonate drip (greater than 75 mEq/bag), 150 mEq, Last  Rate: 150 mEq (03/08/22 0441)        Results Reviewed:   I have personally reviewed the results from the time of this admission to 3/8/2022 08:19 EST     Results from last 7 days   Lab Units 03/08/22  0607 03/07/22  1737 03/07/22  0246 03/06/22  1752   SODIUM mmol/L 154* 153* 148* 140   POTASSIUM mmol/L 4.2 4.5 5.7* 8.0*   CHLORIDE mmol/L 109* 112* 113* 107   CO2 mmol/L 19.9* 16.9* 7.8* 5.1*   BUN mg/dL 150* 153* 154* 163*   CREATININE mg/dL 9.18* 9.05* 8.90* 9.27*   CALCIUM mg/dL 8.4* 8.8 8.9 9.9   BILIRUBIN mg/dL 0.3  --   --  0.5   ALK PHOS U/L 133*  --   --  147*   ALT (SGPT) U/L 42*  --   --  43*   AST (SGOT) U/L 38*  --   --  42*   GLUCOSE mg/dL 133* 120* 136* 136*       Estimated Creatinine Clearance: 6.7 mL/min (A) (by C-G formula based on SCr of 9.18 mg/dL (H)).    Results from last 7 days   Lab Units 03/07/22  0246 03/06/22  1752   MAGNESIUM mg/dL  --  2.5*   PHOSPHORUS mg/dL 6.1*  --              Results from last 7 days   Lab Units 03/08/22  0607 03/06/22  1752   WBC 10*3/mm3 27.51* 32.03*   HEMOGLOBIN g/dL 7.9* 9.6*   PLATELETS 10*3/mm3 264 347             Brief Urine Lab Results     None          No results found for: UTPCR    Imaging Results (Last 24 Hours)     ** No results found for the last 24 hours. **              Assessment / Plan     ASSESSMENT:  Assessment/Plan:       Acute renal failure (HCC): Clearly it is secondary to obstructive complement from fairly large abdominal mass, patient has known about it and has decided not to pursue any further.  She had normal renal function few weeks ago and it all appears that this is acute.  Currently she does have significant hyperkalemia, metabolic acidosis.    Metabolic encephalopathy: She is slightly more responsive today..    Other hydronephrosis: Bilateral hydronephrosis secondary to obstruction, will need nephrostomy tubes.    Pelvic mass: Likely has cervical cancer, and history of refusing any further work-up.    Hyperammonemia (HCC):   Acute left  posterior frontal lobe stroke: Patient is unresponsive.     Risk and complexity: High     Plan:  Serum sodium has been gradually going up secondary to IV fluids, I will go ahead and change it to D5 with 100 mEq of sodium bicarb, it will be hypotonic solution and likely help with the serum sodium.  Potassium is being back to normal, continues to have significant worsening of renal function secondary to obstruction.  Although I do see urology consult has been placed but nobody has seen her since her admission.  Continue with rest of the current treatment plan and surveillance labs, it all depends how aggressive treatment plans are going to be, I do not think dialysis is an option at this point here as she has significant improvement in acidosis as well as potassium.  She can have nephrostomy tube and the renal function should improve.  That could be considered as a palliative measure.  Details were discussed with the  hospitalist service, it does appear palliative/hospice will be meeting with the family today.   Details were also discussed with the hospitalist service and different plans discussed.  Further recommendations will depend on clinical course of the patient during the current hospitalization.    I also discussed the details with the nursing staff.  Rest as ordered.      Thank you for involving us in the care of Phyllis Chen.  Please feel free to call with any questions.    Chito Segura MD  03/08/22  08:19 [facility]    [facility]  [phone]      Much of this encounter note is an electronic transcription/translation of spoken language to printed text. The electronic translation of spoken language may permit erroneous, or at times, nonsensical words or phrases to be inadvertently transcribed; Although I have reviewed the note for such errors, some may still exist.

## 2022-03-08 NOTE — PLAN OF CARE
Goal Outcome Evaluation:              Outcome Evaluation: VSS.  Patient disoriented x4 with only yelling when cared for.  Oxygenation maintained on room air.  Continued cardiac monitoring, neuro checks and accu-checks as ordered. IV fluids administered as ordered (see MAR). SLP consult has been placed with patient NPO.  No acute events notes during shift.  Will continue to monitor patient.

## 2022-03-08 NOTE — CONSULTS
Stroke Consult Note    Patient Name: Phyllis Chen   MRN: [id number]  Age: 71 y.o.  Sex: female  : 1950    Primary Care Physician: Hoa Del Rosario APRN  Referring Physician:  Dr. Desiree Echavarria        Chief Complaint/Reason for Consultation: AMS, abnormal MRI brain    Subjective .  HPI: 70 y/o female with h/o CHF, question of seizure disorder, admitted on 3/6 with several days of increasing confusion, decreased appetite, workup significant for PHYLLIS, Cr 9,  leukocytosis, WBC 32 K, procalcitonin 1.1, pelvic obstructive mass with bilateral hydronephrosis, ammonia 108, phenytoin level 0.8. CT head with question of right frontal encephalomalacia, MRI brain last night with question of a small acute left frontal infarct. Patient has been disoriented and poorly cooperative per RN. She is NPO.  She is now DNR, palliative medicine consulted. No seizures reported.      Review of Systems   Unable to perform ROS: Mental status change        Temp:  [97.6 °F (36.4 °C)-98.5 °F (36.9 °C)] 97.6 °F (36.4 °C)  Heart Rate:  [73-94] 73  Resp:  [20] 20  BP: (145-157)/(69-85) 147/69        Objective   Neurological Exam    Physical Exam  Cardiovascular:      Rate and Rhythm: Rhythm irregular.   Pulmonary:      Effort: Pulmonary effort is normal.   Musculoskeletal:      Cervical back: Neck supple.   Neurological:      Comments: Alerts to voice, oriented to name only, counts fingers, follows some commands, moves arms against gravity, legs with minimal movement.         Acute Stroke Data    Alteplase (tPA) Inclusion / Exclusion Criteria    Time: 11:12 EST  Person Administering Scale: Conrad Lombardi MD    Inclusion Criteria  []   18 years of age or greater   []   Onset of symptoms < 4.5 hours before beginning treatment (stroke onset = time patient was last seen well or without symptoms).   []   Diagnosis of acute ischemic stroke causing measurable disabling deficit (Complete Hemianopia, Any Aphasia, Visual or Sensory  Extinction, Any weakness limiting sustained effort against gravity)   []   Any remaining deficit considered potentially disabling in view of patient and practitioner   Exclusion criteria (Do not proceed with Alteplase if any are checked under exclusion criteria)  [x]   Onset unknown or GREATER than 4.5 hours   []   ICH on CT/MRI   []   CT demonstrates hypodensity representing acute or subacute infarct   []   Significant head trauma or prior stroke in the previous 3 months   []   Symptoms suggestive of subarachnoid hemorrhage   []   History of un-ruptured intracranial aneurysm GREATER than 10 mm   []   Recent intracranial or intraspinal surgery within the last 3 months   []   Arterial puncture at a non-compressible site in the previous 7 days   []   Active internal bleeding   []   Acute bleeding tendency   []   Platelet count LESS than 100,000 for known hematological diseases such as leukemia, thrombocytopenia or chronic cirrhosis   []   Current use of anticoagulant with INR GREATER than 1.7 or PT GREATER than 15 seconds, aPTT GREATER than 40 seconds   []   Heparin received within 48 hours, resulting in abnormally elevated aPTT GREATER than upper limit of normal   []   Current use of direct thrombin inhibitors or direct factor Xa inhibitors in the past 48 hours   []   Elevated blood pressure refractory to treatment (systolic GREATER than 185 mm/Hg or diastolic  GREATER than 110 mm/Hg   []   Suspected infective endocarditis and aortic arch dissection   []   Current use of therapeutic treatment dose of low-molecular-weight heparin (LMWH) within the previous 24 hours   []   Structural GI malignancy or bleed   Relative exclusion for all patients  []   Only minor non-disabling symptoms   []   Pregnancy   []   Seizure at onset with postictal residual neurological impairments   []   Major surgery or previous trauma within past 14 days   []   History of previous spontaneous ICH, intracranial neoplasm, or AV malformation   []    Postpartum (within previous 14 days)   []   Recent GI or urinary tract hemorrhage (within previous 21 days)   []   Recent acute MI (within previous 3 months)   []   History of un-ruptured intracranial aneurysm LESS than 10 mm   []   History of ruptured intracranial aneurysm   []   Blood glucose LESS than 50 mg/dL (2.7 mmol/L)   []   Dural puncture within the last 7 days   []   Known GREATER than 10 cerebral microbleeds   Additional exclusions for patients with symptoms onset between 3 and 4.5 hours.  []   Age > 80.   []   On any anticoagulants regardless of INR  >>> Warfarin (Coumadin), Heparin, Enoxaparin (Lovenox), fondaparinux (Arixtra), bivalirudin (Angiomax), Argatroban, dabigatran (Pradaxa), rivaroxaban (Xarelto), or apixaban (Eliquis)   []   Severe stroke (NIHSS > 25).   []   History of BOTH diabetes and previous ischemic stroke.   []   The risks and benefits have been discussed with the patient or family related to the administration of IV Alteplase for stroke symptoms.   []   I have discussed and reviewed the patient's case and imaging with the attending prior to IV Alteplase.   N/A Time Alteplase administered       Past Medical History:   Diagnosis Date   • CHF (congestive heart failure) (HCC)    • Disease of thyroid gland      Past Surgical History:   Procedure Laterality Date   • EYE SURGERY       No family history on file.  Social History     Socioeconomic History   • Marital status:    Tobacco Use   • Smoking status: Never Smoker   Substance and Sexual Activity   • Alcohol use: Never   • Drug use: Never     Allergies   Allergen Reactions   • Contrast Dye Anaphylaxis   • Nsaids Dizziness   • Aspirin Unknown - Low Severity   • Codeine Unknown - High Severity and Other (See Comments)   • Penicillins Unknown - Low Severity     Prior to Admission medications    Medication Sig Start Date End Date Taking? Authorizing Provider   carvedilol (COREG) 12.5 MG tablet Take 6.25 mg by mouth 2 (Two) Times a  Day With Meals.   Yes Clarice Santos MD   furosemide (LASIX) 20 MG tablet Take 40 mg by mouth Daily.   Yes Clarice Santos MD   levothyroxine (SYNTHROID, LEVOTHROID) 112 MCG tablet Take 112 mcg by mouth Daily.   Yes Clarice Santos MD   loratadine (CLARITIN) 10 MG tablet Take 10 mg by mouth Daily.   Yes Clarice Santos MD   Multiple Vitamins-Iron (MULTI-VITAMIN/IRON PO) Take 1 tablet by mouth Daily.   Yes Clarice Santos MD   potassium chloride (K-DUR,KLOR-CON) 20 MEQ CR tablet Take 20 mEq by mouth Daily.   Yes Clarice Santos MD   cholecalciferol (VITAMIN D3) 25 MCG (1000 UT) tablet Take 2,000 Units by mouth Daily.    Clarice Santos MD   famotidine (PEPCID) 20 MG tablet Take 20 mg by mouth 2 (Two) Times a Day.    Clarice Santos MD   ferrous sulfate 325 (65 FE) MG tablet Take 325 mg by mouth Daily With Breakfast.    Clarice Santos MD   losartan (COZAAR) 100 MG tablet Take 100 mg by mouth Daily.    Clarice Santos MD   phenytoin (DILANTIN) 50 MG chewable tablet Chew 3 (Three) Times a Day.  Patient not taking: Reported on 3/7/2022    Clarice Santos MD   phenytoin ER (DILANTIN) 100 MG capsule Take 500 mg by mouth Every Night.    Clarice Santos MD   vitamin D3 125 MCG (5000 UT) capsule capsule Take  by mouth Daily.  Patient not taking: Reported on 3/7/2022    Clarice Santos MD       Hospital Meds:  Scheduled- aspirin, 325 mg, Oral, Daily   Or  aspirin, 300 mg, Rectal, Daily  atorvastatin, 80 mg, Oral, Nightly  carvedilol, 12.5 mg, Oral, BID With Meals  cefTRIAXone, 1 g, Intravenous, Q24H  heparin (porcine), 5,000 Units, Subcutaneous, Q12H  lactulose, 10 g, Oral, BID  levothyroxine, 112 mcg, Oral, Daily  phenytoin, 50 mg, Oral, Q8H  sodium bicarbonate, 1,300 mg, Oral, 4x Daily  sodium chloride, 10 mL, Intravenous, Q12H  sodium polystyrene, 60 g, Oral, Once      Infusions- sodium bicarbonate drip (greater than 75 mEq/bag), 150 mEq, Last  Rate: 150 mEq (03/08/22 0441)       PRNs- sodium chloride  •  sodium chloride    Functional Status Prior to Current Stroke/[place] Score: 3-4    NIH Stroke Scale  Time: 11:12 EST  Person Administering Scale: Conrad Lombardi MD    1a  Level of consciousness:1   1b. LOC questions: 2   1c. LOC commands:1   2.  Best Gaze:0   3.  Visual:0   4. Facial Palsy:0   5a.  Motor left arm:0   5b.  Motor right arm:1   6a. motor left leg:3   6b  Motor right leg: 3   7. Limb Ataxia:0   8.  Sensory:2   9. Best Language: 2   10. Dysarthria:0   11. Extinction and Inattention:0    Total:15         Results Reviewed:  I have personally reviewed current lab, radiology, and data and agree with results.     CT Abdomen Pelvis Without Contrast    Result Date: 3/6/2022  FINAL REPORT TECHNIQUE: Axial CT images were performed from the lung bases through the symphysis pubis without IV contrast.  This study was performed with techniques to keep radiation doses as low as reasonably achievable (ALARA). Individualized dose reduction techniques using automated exposure control or adjustment of mA and/or kV according to the patient's size were employed. CLINICAL HISTORY: sepsis FINDINGS: Lack of intravenous contrast limits evaluation of solid abdominal and pelvic organs.  ABDOMEN/PELVIS:  Liver, gallbladder and bile ducts: The unenhanced liver is grossly unremarkable without focal abnormality.  There has been a prior cholecystectomy.  There is no definite biliary duct dilatation. Adrenal glands: The adrenal glands are morphologically unremarkable without suspicious lesion.  Kidneys, ureter and urinary bladder: There is severe bilateral hydronephrosis to the level of the urinary bladder trigone.  There appears to be an ill-defined infiltrating lesion probably arising from the uterine cervix.  There is urinary bladder wall thickening. Spleen: The spleen is normal size.  Pancreas: The pancreas is grossly unremarkable.  GI systems and mesentery: No  evidence of bowel obstruction.  The appendix is visualized and unremarkable in appearance.  No significant mesenteric inflammation.  Lymph nodes: No definite pathologically enlarged abdominal or pelvic lymph nodes present within the limits of this noncontrast enhanced exam.  Vessels: The abdominal aorta is normal in caliber.  The inferior vena cava is unremarkable.  Peritoneum: No free intraperitoneal fluid or pneumoperitoneum.  Pelvic viscera: Refer to above.    Body wall: No body wall contusion. Bones: No acute fracture.     Findings are concerning for an obstructing mass in the pelvis causing severe bilateral hydronephrosis.  Underlying cervical malignancy is the diagnosis of exclusion. Authenticated by Reuben Farrell MD on 03/06/2022 09:47:12 PM    CT Head Without Contrast    Result Date: 3/6/2022  FINAL REPORT TECHNIQUE: Multiple axial CT images were performed from the foramen magnum to the vertex. This study was performed with techniques to keep radiation doses as low as reasonably achievable (ALARA). Individualized dose reduction techniques using automated exposure control or adjustment of mA and/or kV according to the patient's size were employed. CLINICAL HISTORY: AMS FINDINGS: There is a low-attenuation lesion present within the inferior right frontal lobe.  No acute intracranial hemorrhage or large acute cortical infarct.  The brain volume is normal for patient's age.  Ventricles are normal in size and configuration. No midline shift.  The basal cisterns are patent.  No skull fracture.  The visualized paranasal sinuses and mastoid air cells are clear.     Low attenuation lesion in the inferior right frontal lobe may represent encephalomalacia.  This can be further evaluated with MRI if clinically warranted.. Authenticated by Reuben Farrell MD on 03/06/2022 07:00:59 PM    CT Chest Without Contrast Diagnostic    Result Date: 3/6/2022  FINAL REPORT TECHNIQUE: Axial CT images were obtained from the lung apex to  the mid abdomen.  Coronal and sagittal reformatted images generated from the axial data set and provided for interpretation. This study was performed with techniques to keep radiation doses as low as reasonably achievable (ALARA). Individualized dose reduction techniques using automated exposure control or adjustment of mA and/or kV according to the patient's size were employed. CLINICAL HISTORY: sepsis FINDINGS: Chest:  Lungs/Pleura:  There is basal atelectasis.  Vessels: The aorta and main pulmonary artery are normal in caliber. Lymph nodes:  No pathologically enlarged thoracic lymph nodes. Chest Wall:  No chest wall contusion.  Bones:  No acute fracture.     No acute findings in the chest to account for the patient's symptoms. Authenticated by Reuben Farrell MD on 03/06/2022 09:47:13 PM    MRI Brain Without Contrast    Result Date: 3/7/2022  PROCEDURE: MRI BRAIN WO CONTRAST-  HISTORY: Transient ischemic attack (TIA); N17.9-Acute kidney failure, unspecified; E87.5-Hyperkalemia; G92.8-Other toxic encephalopathy; N85.8-Other specified noninflammatory disorders of uterus; N13.9-Obstructive and reflux uropathy, unspecified; N39.0-Urinary tract infection, site not specified; E72.20-Disorder of urea cycle metabolism, unspecified  PROCEDURE: Multiplanar multisequence imaging of the brain was performed without the use of intravenous contrast.  COMPARISON: None.  FINDINGS: There is generalized cerebral volume loss. There are FLAIR hyperintensities in the periventricular white matter of both cerebral hemispheres consistent with chronic small vessel ischemic change. There is a punctate area of restricted diffusion in the posterior left frontal lobe consistent with an acute infarct. There is no mass, mass effect or midline shift. There is no hydrocephalus.  The midbrain, edgardo, cerebellum and craniocervical junction are unremarkable. The sella and pituitary gland are within normal limits. The major intracranial vasculature  demonstrates the expected flow related signal. The paranasal sinuses are clear.      Punctate area of restricted diffusion in the posterior left frontal lobe consistent with an acute infarct.    This report was signed and finalized on 3/7/2022 8:16 AM by Pauline Pearce M.D..    XR Chest 1 View    Result Date: 3/7/2022  PROCEDURE: XR CHEST 1 VW-  HISTORY: Weak/Dizzy/AMS triage protocol  COMPARISON: None.  FINDINGS: The heart is normal in size. The mediastinum is unremarkable. The lungs are clear. There is no pneumothorax.  There are no acute osseous abnormalities.      No acute cardiopulmonary process.  Continued followup is recommended.  This report was signed and finalized on 3/7/2022 8:16 AM by Pauline Pearce M.D..            Assessment/Plan:  1. Small acute left frontal, MCA territory stroke, suspect cardiac vs carotid source. No TPA or intervention on time.   - Stroke order set.   - ASA and statin.   - TTE.   - Carotid duplex.      2.TME in the setting of PHYLLIS, obstructing pelvic mass, leukocytosis, hyperammoniemia. Overall unfavorable prognosis.   - Consider changing lactulose to pr while NPO.   - Consider EEG.   - Patient is DNR, palliative consulted per RN.    3. Question of seizure disorder, on low dose phenytoin.   - Consider EEG.      Call for questions, will follow peripherally. Thanks,        Conrad Lombardi MD  March 8, 2022  11:12 EST      This was an audio and video enabled telemedicine encounter.

## 2022-03-08 NOTE — PLAN OF CARE
Goal Outcome Evaluation:      Position changed more frequently, reoriented to place,time and date several times.

## 2022-03-09 ENCOUNTER — APPOINTMENT (OUTPATIENT)
Dept: ULTRASOUND IMAGING | Facility: HOSPITAL | Age: 72
End: 2022-03-09

## 2022-03-09 LAB
ALBUMIN SERPL-MCNC: 2.2 G/DL (ref 3.5–5.2)
ALBUMIN/GLOB SERPL: 0.6 G/DL
ALP SERPL-CCNC: 137 U/L (ref 39–117)
ALT SERPL W P-5'-P-CCNC: 32 U/L (ref 1–33)
ANION GAP SERPL CALCULATED.3IONS-SCNC: 25.2 MMOL/L (ref 5–15)
AST SERPL-CCNC: 26 U/L (ref 1–32)
BASOPHILS # BLD AUTO: 0.08 10*3/MM3 (ref 0–0.2)
BASOPHILS NFR BLD AUTO: 0.3 % (ref 0–1.5)
BILIRUB SERPL-MCNC: 0.3 MG/DL (ref 0–1.2)
BUN SERPL-MCNC: 151 MG/DL (ref 8–23)
BUN/CREAT SERPL: 15.3 (ref 7–25)
CALCIUM SPEC-SCNC: 8.3 MG/DL (ref 8.6–10.5)
CHLORIDE SERPL-SCNC: 106 MMOL/L (ref 98–107)
CO2 SERPL-SCNC: 22.8 MMOL/L (ref 22–29)
CREAT SERPL-MCNC: 9.9 MG/DL (ref 0.57–1)
DEPRECATED RDW RBC AUTO: 48.6 FL (ref 37–54)
EGFRCR SERPLBLD CKD-EPI 2021: 3.9 ML/MIN/1.73
EOSINOPHIL # BLD AUTO: 0.25 10*3/MM3 (ref 0–0.4)
EOSINOPHIL NFR BLD AUTO: 1 % (ref 0.3–6.2)
ERYTHROCYTE [DISTWIDTH] IN BLOOD BY AUTOMATED COUNT: 14.6 % (ref 12.3–15.4)
GLOBULIN UR ELPH-MCNC: 4 GM/DL
GLUCOSE BLDC GLUCOMTR-MCNC: 117 MG/DL (ref 70–130)
GLUCOSE BLDC GLUCOMTR-MCNC: 127 MG/DL (ref 70–130)
GLUCOSE BLDC GLUCOMTR-MCNC: 135 MG/DL (ref 70–130)
GLUCOSE BLDC GLUCOMTR-MCNC: 95 MG/DL (ref 70–130)
GLUCOSE SERPL-MCNC: 119 MG/DL (ref 65–99)
HCT VFR BLD AUTO: 23.1 % (ref 34–46.6)
HGB BLD-MCNC: 8 G/DL (ref 12–15.9)
IMM GRANULOCYTES # BLD AUTO: 0.32 10*3/MM3 (ref 0–0.05)
IMM GRANULOCYTES NFR BLD AUTO: 1.3 % (ref 0–0.5)
LYMPHOCYTES # BLD AUTO: 2.37 10*3/MM3 (ref 0.7–3.1)
LYMPHOCYTES NFR BLD AUTO: 9.9 % (ref 19.6–45.3)
MCH RBC QN AUTO: 31.5 PG (ref 26.6–33)
MCHC RBC AUTO-ENTMCNC: 34.6 G/DL (ref 31.5–35.7)
MCV RBC AUTO: 90.9 FL (ref 79–97)
MONOCYTES # BLD AUTO: 1.12 10*3/MM3 (ref 0.1–0.9)
MONOCYTES NFR BLD AUTO: 4.7 % (ref 5–12)
NEUTROPHILS NFR BLD AUTO: 19.76 10*3/MM3 (ref 1.7–7)
NEUTROPHILS NFR BLD AUTO: 82.8 % (ref 42.7–76)
NRBC BLD AUTO-RTO: 0 /100 WBC (ref 0–0.2)
PLATELET # BLD AUTO: 233 10*3/MM3 (ref 140–450)
PMV BLD AUTO: 9.1 FL (ref 6–12)
POTASSIUM SERPL-SCNC: 3.8 MMOL/L (ref 3.5–5.2)
PROT SERPL-MCNC: 6.2 G/DL (ref 6–8.5)
RBC # BLD AUTO: 2.54 10*6/MM3 (ref 3.77–5.28)
SODIUM SERPL-SCNC: 154 MMOL/L (ref 136–145)
WBC NRBC COR # BLD: 23.9 10*3/MM3 (ref 3.4–10.8)

## 2022-03-09 PROCEDURE — 82962 GLUCOSE BLOOD TEST: CPT

## 2022-03-09 PROCEDURE — 99233 SBSQ HOSP IP/OBS HIGH 50: CPT | Performed by: FAMILY MEDICINE

## 2022-03-09 PROCEDURE — 25010000002 CEFTRIAXONE SODIUM-DEXTROSE 1-3.74 GM-%(50ML) RECONSTITUTED SOLUTION: Performed by: HOSPITALIST

## 2022-03-09 PROCEDURE — 93880 EXTRACRANIAL BILAT STUDY: CPT

## 2022-03-09 PROCEDURE — 25010000002 HEPARIN (PORCINE) PER 1000 UNITS: Performed by: HOSPITALIST

## 2022-03-09 PROCEDURE — 99221 1ST HOSP IP/OBS SF/LOW 40: CPT | Performed by: PHYSICIAN ASSISTANT

## 2022-03-09 PROCEDURE — 85025 COMPLETE CBC W/AUTO DIFF WBC: CPT | Performed by: FAMILY MEDICINE

## 2022-03-09 PROCEDURE — 80053 COMPREHEN METABOLIC PANEL: CPT | Performed by: FAMILY MEDICINE

## 2022-03-09 RX ADMIN — SODIUM BICARBONATE 650 MG TABLET 1300 MG: at 22:38

## 2022-03-09 RX ADMIN — PHENYTOIN 50 MG: 50 TABLET, CHEWABLE ORAL at 22:38

## 2022-03-09 RX ADMIN — Medication 10 ML: at 22:38

## 2022-03-09 RX ADMIN — HEPARIN SODIUM 5000 UNITS: 5000 INJECTION, SOLUTION INTRAVENOUS; SUBCUTANEOUS at 22:38

## 2022-03-09 RX ADMIN — SODIUM BICARBONATE: 84 INJECTION, SOLUTION INTRAVENOUS at 22:50

## 2022-03-09 RX ADMIN — HEPARIN SODIUM 5000 UNITS: 5000 INJECTION, SOLUTION INTRAVENOUS; SUBCUTANEOUS at 10:21

## 2022-03-09 RX ADMIN — SODIUM BICARBONATE: 84 INJECTION, SOLUTION INTRAVENOUS at 09:40

## 2022-03-09 RX ADMIN — LACTULOSE 10 G: 20 SOLUTION ORAL at 22:38

## 2022-03-09 RX ADMIN — CEFTRIAXONE 1 G: 1 INJECTION, SOLUTION INTRAVENOUS at 13:16

## 2022-03-09 RX ADMIN — ATORVASTATIN CALCIUM 80 MG: 80 TABLET, FILM COATED ORAL at 22:38

## 2022-03-09 NOTE — PROGRESS NOTES
"    [facility]    Name:  Phyllis Chen   Age:  71 y.o.  Sex:  female  :  1950  MRN:  [id number]   Visit Number:  [id number]  Admission Date:  3/6/2022  Date Of Service:  22  Primary Care Physician:  Hoa Del Rosario APRN     LOS: 3 days :    Chief Complaint:      Altered mental status    Subjective:    Patient seen and examined at bedside today.  Chart reviewed and events noted.  Patient continues to be awake alert but confused and disoriented.  Patient asking for water to drink.  Patient would answer yes to any question without appearing to understand what the question is.  No family at bedside during my exam today.  Vitals stable and afebrile.    Hospital Course:    72 yo F with HTN, Morbid obesity, Seizure disorder, Morbid obesity, Chronic bilateral lower extremity lymphedema that presented to the hospital with altered mental status. CT head without contrast has shown low attenuation lesion in the inferior right frontal lobe may represent encephalomalacia. CT chest without contrast is with no acute findings in the chest to account for patient's symptoms.  CT abdomen without contrast is concerning for obstructing mass in the pelvis causing severe bilateral hydronephrosis.  Underlying cervical malignancy is the diagnosis of exclusion. Patient found to be in acute renal failure with hyperkalemia.     In the ED, gynecology Dr. Camacho discussed the case with ED physician: \"Given the size of the mass and the obstructive component it was felt that the tumor would not be appropriate for surgical intervention.  We will consult on the patient to help with planning and or tissue diagnosis.  Patient would likely require bilateral percutaneous nephrostomy tubes to relieve obstruction.  Will consult urology on inpatient basis.  Attempted possible transfer patient there are no beds at any of the tertiary [facility] centers with an appropriate referral distancing.  " "Multiple discussion with the patient's son at bedside and over the phone.  Given the patient's very guarded and poor prognosis he was open to discussing goals of care and/or end-of-life care with palliative care/hospice.\" Nephrology consulted, urology consulted, hospice consulted, Gyn consulted.    Review of Systems:     All systems were reviewed and negative except as mentioned in subjective, assessment and plan.    Vital Signs:    Temp:  [96.4 °F (35.8 °C)-97.4 °F (36.3 °C)] 97.4 °F (36.3 °C)  Heart Rate:  [77-90] 80  Resp:  [18] 18  BP: (137-154)/(74-85) 154/82    Intake and output:    I/O last 3 completed shifts:  In: 4398.1 [I.V.:4398.1]  Out: -   No intake/output data recorded.    Physical Examination:    General Appearance:   Awake and alert.  Chronically ill.   Head:  Atraumatic and normocephalic.   Eyes: Conjunctivae and sclerae normal, no icterus. No pallor.   Throat: No oral lesions, no thrush, oral mucosa moist.   Neck: Supple, trachea midline, no thyromegaly.   Lungs:   Breath sounds heard bilaterally equally.  No wheezing or crackles. No Pleural rub or bronchial breathing.   Heart:  Normal S1 and S2, no murmur, no gallop, no rub. No JVD.   Abdomen:   Normal bowel sounds, no masses, no organomegaly. Soft, nontender, nondistended, no rebound tenderness.   Extremities: Supple, no edema, no cyanosis, no clubbing.   Skin: No bleeding or rash.   Neurologic:  Awake and alert but confused and disoriented. No facial asymmetry. No tremors.  Moving all 4 extremities.     Laboratory results:    Results from last 7 days   Lab Units 03/09/22  1026 03/08/22  0607 03/07/22  1737 03/07/22  0246 03/06/22  1752   SODIUM mmol/L 154* 154* 153*   < > 140   POTASSIUM mmol/L 3.8 4.2 4.5   < > 8.0*   CHLORIDE mmol/L 106 109* 112*   < > 107   CO2 mmol/L 22.8 19.9* 16.9*   < > 5.1*   BUN mg/dL 151* 150* 153*   < > 163*   CREATININE mg/dL 9.90* 9.18* 9.05*   < > 9.27*   CALCIUM mg/dL 8.3* 8.4* 8.8   < > 9.9   BILIRUBIN mg/dL 0.3 " 0.3  --   --  0.5   ALK PHOS U/L 137* 133*  --   --  147*   ALT (SGPT) U/L 32 42*  --   --  43*   AST (SGOT) U/L 26 38*  --   --  42*   GLUCOSE mg/dL 119* 133* 120*   < > 136*    < > = values in this interval not displayed.     Results from last 7 days   Lab Units 03/09/22  1026 03/08/22  0607 03/06/22  1752   WBC 10*3/mm3 23.90* 27.51* 32.03*   HEMOGLOBIN g/dL 8.0* 7.9* 9.6*   HEMATOCRIT % 23.1* 22.0* 30.1*   PLATELETS 10*3/mm3 233 264 347         Results from last 7 days   Lab Units 03/06/22  1752   TROPONIN T ng/mL 0.255*     Results from last 7 days   Lab Units 03/06/22  1752   BLOODCX  No growth at 2 days  No growth at 2 days         I have reviewed the patient's laboratory results.    Radiology results:    No radiology results from the last 24 hrs  I have reviewed the patient's radiology reports.    Medication Review:     I have reviewed the patient's active and prn medications.     Problem List:      Metabolic encephalopathy    Acute renal failure (HCC)    Other hydronephrosis    Pelvic mass    Hyperkalemia    Hyperammonemia (HCC)      Assessment:    1. Acute Metabolic Encephalopathy, POA  2. Acute Posterior Left Frontal Lobe Stroke, POA  3. Acute Renal Failure, POA  4. Severe, Bilateral Hydronephrosis, POA  5. Obstructing Pelvic Mass, POA  6. Hyperkalemia, POA  7. Hyperammonemia, POA  8. Hyperphosphatemia, POA  9. Elevated Troponin, POA  10. Essential Hypertension  11. Hyperlipidemia  12. Seizure Disorder  13. Hypothyroidism  14. Chronic Bilateral Lower Extremity Lymphedema  15. Morbid Obesity, BMI 39    Plan:    Acute encephalopathy  Acute left posterior frontal lobe stroke  -Patient encephalopathy continues to remain the same.  -MRI revealed acute posterior left frontal lobe stroke  -Neurology has been consulted.  No intervention at this time.  -Continue aspirin and statin.  -Echocardiogram and carotid ultrasound ordered and pending.  -Cont telemetry    Seizure disorder  -On low-dose phenytoin.    Acute  renal failure  Hyperkalemia  Obstructing pelvic mass  Severe, Bilateral Hydronephrosis  -Nephrology consulted, recommendations and assistance greatly appreciated  -Medically treating hyperkalemia at this time  -Urology consulted for possible urostomy tube placement and hicks catheter placement.  Procedures were discussed with the patient's son by urology today.  Family would like to think about that until tomorrow.  - On IVF     Hyperphosphatemia  Hyperammonemia  Hyperkalemia  -Treat medically as above    -Patient has a very, very poor prognosis. Family has been talked to regarding the complexity of her multisystem organ failure.   -Dr. Alegria consulted. Assistance greatly appreciated.    -Patient's son who is the POA came in later during the day.  I have seen and discussed with him again today at bedside the patient clinical status.  The family has discussed with urology earlier and discussed nephrostomy tubes.  Family has been leaning towards home with hospice however has not decided about that yet and would like to think about it until tomorrow.  I also discussed with the son about starting patients on diet giving the fact that patient is encephalopathic and has a stroke and is a high risk for aspiration. The son was clear that he did not want the patient to be starving and had decided to go ahead and start the patient on pleasure feeding diet understanding the risks and possible complications of aspiration.  All questions were answered to his satisfaction.    DVT Prophylaxis: Heparin  Code Status: DNR/DNI  Diet: Renal puréed diet  Discharge Plan: To be determined    Jose Luis Escoto MD  03/09/22  16:48 EST    Dictated utilizing Dragon dictation.

## 2022-03-09 NOTE — PROGRESS NOTES
Adult Nutrition  Assessment/PES    Patient Name:  Phyllis Chen  YOB: 1950  MRN: [id number]  Admit Date:  3/6/2022    Assessment Date:  3/9/2022    Comments:      Recommend:    1. Continue NPO as medically appropriate and tolerated.   2. If pt is to remain NPO > 5 days, consider nutrition support. TF recs below.   3. Establish and encourage PO intake as medically appropriate and tolerated.  4. Consider a multivitamin with minerals daily.  5. Continue to monitor and replace electrolytes PRN.    Rec #1: Peptamen AF @15mL/hr advancing 10mL/hr every 8 hours to goal rate of 68mL/hr. TF to provide 1795 kcals, 113 grams protein, and 1214 mL tube feeding water.   Rec #2: Free water flushes 97mL Q4 hours or six times daily.     RD to follow pt and available PRN.     Reason for Assessment     Row Name 03/09/22 1512          Reason for Assessment    Reason For Assessment per organizational policy;diagnosis/disease state;identified at risk by screening criteria;follow-up protocol     Diagnosis endocrine conditions;cardiac disease;renal disease     Identified At Risk by Screening Criteria MST SCORE 2+;large or nonhealing wound, burn or pressure injury;reduced oral intake over the last month                    Labs/Tests/Procedures/Meds     Row Name 03/09/22 1511          Labs/Procedures/Meds    Lab Results Reviewed reviewed, pertinent     Lab Results Comments Low: A1c, Alb; High: Phos, Procalc, NH3, Mg, BUN, Crt, Glu, Na            Medications    Pertinent Medications Reviewed reviewed, pertinent     Pertinent Medications Comments D5W, NaCl, Na bicarb, heparin, lactulose, Dilantin, Rocephin                Physical Findings     Row Name 03/09/22 1516          Physical Findings    Overall Physical Appearance obese, missing teeth, 1+ trace edema in legs, 2+ mild edema in ankles and feet, bilateral gluteal pressure injury, right lower breast MASD, left lower breast MASD, bilateral anterior groin pressure injury                   Nutrition Prescription Ordered     Row Name 03/09/22 1531          Nutrition Prescription PO    Current PO Diet NPO            Nutrition Prescription PN    Dextrose Concentration (%) 5 %     Dextrose (Kcal) 408                       Problem/Interventions:   Problem 1     Row Name 03/09/22 1531          Nutrition Diagnoses Problem 1    Problem 1 Increased Nutrient Needs     Macronutrient Kcal;Protein     Etiology (related to) Medical Diagnosis     Skin Pressure injury;Skin breakdown     Signs/Symptoms (evidenced by) Report/Observation     Reported/Observed By RD/Tech                Problem 2     Row Name 03/09/22 1531          Nutrition Diagnoses Problem 2    Problem 2 Predicted Suboptimal Intake     Etiology (related to) Medical Diagnosis;MNT for Treatment/Condition     Signs/Symptoms (evidenced by) NPO;Report of Minimal PO Intake;Report/Observation     Reported/Observed By RD/Tech                    Intervention Goal     Row Name 03/09/22 1532          Intervention Goal    General Maintain nutrition;Improved nutrition related lab(s);Meet nutritional needs for age/condition;Disease management/therapy     PO Initiate feeding;Establish PO;Tolerate PO;Meet estimated needs     Weight Maintain weight                Nutrition Intervention     Row Name 03/09/22 1532          Nutrition Intervention    RD/Tech Action Follow Tx progress;Care plan reviewd;Await begin PO;Recommend/ordered     Recommended/Ordered Diet;EN                Nutrition Prescription     Row Name 03/09/22 1532          Nutrition Prescription PO    PO Prescription Other (comment)  continue NPO as medically appropriate and tolerated     New PO Prescription Ordered? No, recommended            Nutrition Prescription EN    Enteral Prescription Enteral begin/change     Product Peptamen AF     TF Delivery Method Continuous     Continuous TF Goal Rate (mL/hr) 68 mL/hr     Continuous TF Starting Rate (mL/hr) 15 mL/hr     Continuous TF Goal Volume (mL)  1496 mL     Continuous TF Starting Volume (mL) 330 mL     Water flush (mL)  97 mL     Water Flush Frequency Every 4 hours     New EN Prescription Ordered? No, recommended            Other Orders    Obtain Weight Daily     Obtain Weight Ordered? No, recommended     Supplement Vitamin mineral supplement     Supplement Ordered? No, recommended     Labs Mg++;Phos;K+     Labs Ordered? No, recommended     Other continue to monitor and replace electrolytes PRN                Education/Evaluation     Row Name 03/09/22 1545          Education    Education Education not appropriate at this time     Please explain Patient confusion            Monitor/Evaluation    Monitor Per protocol;I&O;Pertinent labs;Weight;Skin status;Symptoms                 Electronically signed by:  Jenae Moon RD  03/09/22 15:46 EST

## 2022-03-09 NOTE — PROGRESS NOTES
[facility] Progress Note  [facility]. [place]        Patient Name: Phyllis Chen  : 1950  MRN: [id number]   LOS: 3 days    Patient Care Team:  Hoa Del Rosario APRN as PCP - General (Family Medicine)    Chief Complaint:    Chief Complaint   Patient presents with   • Altered Mental Status     EMS stated pts family called concerned that pt was more altered than usual. Stated pt has lost the will to eat and or drink x3 days.      Primary Care Physician:  Hoa Del Rosario APRN  Date of admission: 3/6/2022    Subjective     Interval History:   Events noted from last 24 hours.  Patient is slightly more awake and still cannot answer questions appropriately.  Randomly moving extremities.  It has been noted that multiple discussions with the family members is ongoing about comfort care and hospice.  Still awaiting urology input.    Review of Systems:   As noted above    Objective     Vitals:   Temp:  [96.4 °F (35.8 °C)-97.6 °F (36.4 °C)] 96.4 °F (35.8 °C)  Heart Rate:  [80-94] 80  Resp:  [18-20] 18  BP: (137-160)/(74-85) 144/74    Intake/Output Summary (Last 24 hours) at 3/9/2022 0808  Last data filed at 3/9/2022 0653  Gross per 24 hour   Intake 1889.91 ml   Output --   Net 1889.91 ml       Physical Exam:    General Appearance: alert, no acute distress   Skin: warm and dry  HEENT: oral mucosa normal, nonicteric sclera  Neck: supple, no JVD  Lungs: CTA  Heart: RRR, normal S1 and S2  Abdomen: soft, tender, nondistended  : no palpable bladder  Extremities: no edema, cyanosis or clubbing  Neuro: normal speech and mental status     Scheduled Meds:     Current Facility-Administered Medications   Medication Dose Route Frequency Provider Last Rate Last Admin   • aspirin tablet 325 mg  325 mg Oral Daily Desiree Gurrola DO        Or   • aspirin suppository 300 mg  300 mg Rectal Daily Desiree Gurrola DO   300 mg at 22 3013   • atorvastatin (LIPITOR) tablet 80 mg   80 mg Oral Nightly Desiree Gurrola DO       • carvedilol (COREG) tablet 12.5 mg  12.5 mg Oral BID With Meals Ila Bear MD       • cefTRIAXone (ROCEPHIN) IVPB 1 g/50ml dextrose (premix)  1 g Intravenous Q24H Ila Bear  mL/hr at 03/08/22 1317 1 g at 03/08/22 1317   • heparin (porcine) 5000 UNIT/ML injection 5,000 Units  5,000 Units Subcutaneous Q12H Ila Bear MD   5,000 Units at 03/08/22 2200   • lactulose (CHRONULAC) 10 GM/15ML solution 10 g  10 g Oral BID Ila Bear MD       • levothyroxine (SYNTHROID, LEVOTHROID) tablet 112 mcg  112 mcg Oral Daily Ila Bear MD       • phenytoin (DILANTIN) chewable tablet 50 mg  50 mg Oral Q8H Ila Bear MD       • sodium bicarbonate 8.4 % 100 mEq in dextrose (D5W) 5 % 1,000 mL infusion   Intravenous Continuous Chito Segura  mL/hr at 03/08/22 1639 New Bag at 03/08/22 1639   • sodium bicarbonate tablet 1,300 mg  1,300 mg Oral 4x Daily Chito Segura MD       • sodium chloride 0.9 % flush 10 mL  10 mL Intravenous PRN Desiree Elmore MD       • sodium chloride 0.9 % flush 10 mL  10 mL Intravenous Q12H Desiree Gurrola DO   10 mL at 03/08/22 2200   • sodium chloride 0.9 % flush 10 mL  10 mL Intravenous PRN Desiree Gurrola DO       • sodium polystyrene (KAYEXALATE) 15 GM/60ML suspension 60 g  60 g Oral Once Chito Segura MD           aspirin, 325 mg, Oral, Daily   Or  aspirin, 300 mg, Rectal, Daily  atorvastatin, 80 mg, Oral, Nightly  carvedilol, 12.5 mg, Oral, BID With Meals  cefTRIAXone, 1 g, Intravenous, Q24H  heparin (porcine), 5,000 Units, Subcutaneous, Q12H  lactulose, 10 g, Oral, BID  levothyroxine, 112 mcg, Oral, Daily  phenytoin, 50 mg, Oral, Q8H  sodium bicarbonate, 1,300 mg, Oral, 4x Daily  sodium chloride, 10 mL, Intravenous, Q12H  sodium polystyrene, 60 g, Oral, Once        IV Meds:   custom IV infusion builder, , Last Rate: 100 mL/hr at 03/08/22 9855        Results Reviewed:   I have personally reviewed the  results from the time of this admission to 3/9/2022 08:08 EST     Results from last 7 days   Lab Units 03/08/22  0607 03/07/22  1737 03/07/22  0246 03/06/22  1752   SODIUM mmol/L 154* 153* 148* 140   POTASSIUM mmol/L 4.2 4.5 5.7* 8.0*   CHLORIDE mmol/L 109* 112* 113* 107   CO2 mmol/L 19.9* 16.9* 7.8* 5.1*   BUN mg/dL 150* 153* 154* 163*   CREATININE mg/dL 9.18* 9.05* 8.90* 9.27*   CALCIUM mg/dL 8.4* 8.8 8.9 9.9   BILIRUBIN mg/dL 0.3  --   --  0.5   ALK PHOS U/L 133*  --   --  147*   ALT (SGPT) U/L 42*  --   --  43*   AST (SGOT) U/L 38*  --   --  42*   GLUCOSE mg/dL 133* 120* 136* 136*       Estimated Creatinine Clearance: 6.8 mL/min (A) (by C-G formula based on SCr of 9.18 mg/dL (H)).    Results from last 7 days   Lab Units 03/07/22  0246 03/06/22  1752   MAGNESIUM mg/dL  --  2.5*   PHOSPHORUS mg/dL 6.1*  --              Results from last 7 days   Lab Units 03/08/22  0607 03/06/22  1752   WBC 10*3/mm3 27.51* 32.03*   HEMOGLOBIN g/dL 7.9* 9.6*   PLATELETS 10*3/mm3 264 347             Brief Urine Lab Results     None          No results found for: UTPCR    Imaging Results (Last 24 Hours)     ** No results found for the last 24 hours. **              Assessment / Plan     ASSESSMENT:  Assessment/Plan:       Acute renal failure (HCC): Clearly it is secondary to obstructive complement from fairly large abdominal mass, patient has known about it and has decided not to pursue any further.  She had normal renal function few weeks ago and it all appears that this is acute.  Currently she does have significant hyperkalemia, metabolic acidosis.    Metabolic encephalopathy: She is slightly more responsive today..    Other hydronephrosis: Bilateral hydronephrosis secondary to obstruction, will need nephrostomy tubes.    Pelvic mass: Likely has cervical cancer, and history of refusing any further work-up.    Hyperammonemia (HCC):   Acute left posterior frontal lobe stroke: Patient is slightly more  responsive.        Plan:  Continue with the fluids until she is ready to go.  Details were discussed with the  hospitalist service, it does appear palliative/hospice met with the family and plans to go home with hospice.   From the notes it does appear that they want to discuss this with urology.  Details were also discussed with the hospitalist service and Dr. David.  Further recommendations will depend on clinical course of the patient during the current hospitalization.    I also discussed the details with the nursing staff.  Rest as ordered.      Thank you for involving us in the care of Phyllis Chen.  Please feel free to call with any questions.    Chito Segura MD  03/09/22  08:08 [facility]    [facility]  [phone]      Much of this encounter note is an electronic transcription/translation of spoken language to printed text. The electronic translation of spoken language may permit erroneous, or at times, nonsensical words or phrases to be inadvertently transcribed; Although I have reviewed the note for such errors, some may still exist.

## 2022-03-09 NOTE — PROGRESS NOTES
"    [facility]    Name:  Phyllis Chen   Age:  71 y.o.  Sex:  female  :  1950  MRN:  [id number]   Visit Number:  [id number]  Admission Date:  3/6/2022  Date Of Service:  22  Primary Care Physician:  Hoa Del Rosario APRN     LOS: 2 days :    Chief Complaint:      Altered mental status    Subjective:    Patient seen and examined at bedside today.  Chart reviewed and events noted.  Patient is awake alert but confused and disoriented.  Patient did not know where she is and what her situation is.  Son at bedside.  I discussed with the son the results.  Plan for discussion with Dr. Alegria later today.  Vitals stable and afebrile.    Hospital Course:    72 yo F with HTN, Morbid obesity, Seizure disorder, Morbid obesity, Chronic bilateral lower extremity lymphedema that presented to the hospital with altered mental status. CT head without contrast has shown low attenuation lesion in the inferior right frontal lobe may represent encephalomalacia. CT chest without contrast is with no acute findings in the chest to account for patient's symptoms.  CT abdomen without contrast is concerning for obstructing mass in the pelvis causing severe bilateral hydronephrosis.  Underlying cervical malignancy is the diagnosis of exclusion. Patient found to be in acute renal failure with hyperkalemia.     In the ED, gynecology Dr. Camacho discussed the case with ED physician: \"Given the size of the mass and the obstructive component it was felt that the tumor would not be appropriate for surgical intervention.  We will consult on the patient to help with planning and or tissue diagnosis.  Patient would likely require bilateral percutaneous nephrostomy tubes to relieve obstruction.  Will consult urology on inpatient basis.  Attempted possible transfer patient there are no beds at any of the tertiary [facility] centers with an appropriate referral distancing.  Multiple discussion with " "the patient's son at bedside and over the phone.  Given the patient's very guarded and poor prognosis he was open to discussing goals of care and/or end-of-life care with palliative care/hospice.\" Nephrology consulted, urology consulted, hospice consulted, Gyn consulted.    Review of Systems:     All systems were reviewed and negative except as mentioned in subjective, assessment and plan.    Vital Signs:    Temp:  [97.4 °F (36.3 °C)-98 °F (36.7 °C)] 97.6 °F (36.4 °C)  Heart Rate:  [73-94] 92  Resp:  [20] 20  BP: (145-160)/(69-84) 157/76    Intake and output:    I/O last 3 completed shifts:  In: 2508.2 [I.V.:2508.2]  Out: -   No intake/output data recorded.    Physical Examination:    General Appearance:   Awake and alert.  Chronically ill.   Head:  Atraumatic and normocephalic.   Eyes: Conjunctivae and sclerae normal, no icterus. No pallor.   Throat: No oral lesions, no thrush, oral mucosa moist.   Neck: Supple, trachea midline, no thyromegaly.   Lungs:   Breath sounds heard bilaterally equally.  No wheezing or crackles. No Pleural rub or bronchial breathing.   Heart:  Normal S1 and S2, no murmur, no gallop, no rub. No JVD.   Abdomen:   Normal bowel sounds, no masses, no organomegaly. Soft, nontender, nondistended, no rebound tenderness.   Extremities: Supple, no edema, no cyanosis, no clubbing.   Skin: No bleeding or rash.   Neurologic:  Awake and alert but confused and disoriented. No facial asymmetry. No tremors.  Moving all 4 extremities.     Laboratory results:    Results from last 7 days   Lab Units 03/08/22  0607 03/07/22  1737 03/07/22  0246 03/06/22  1752   SODIUM mmol/L 154* 153* 148* 140   POTASSIUM mmol/L 4.2 4.5 5.7* 8.0*   CHLORIDE mmol/L 109* 112* 113* 107   CO2 mmol/L 19.9* 16.9* 7.8* 5.1*   BUN mg/dL 150* 153* 154* 163*   CREATININE mg/dL 9.18* 9.05* 8.90* 9.27*   CALCIUM mg/dL 8.4* 8.8 8.9 9.9   BILIRUBIN mg/dL 0.3  --   --  0.5   ALK PHOS U/L 133*  --   --  147*   ALT (SGPT) U/L 42*  --   --  43* "   AST (SGOT) U/L 38*  --   --  42*   GLUCOSE mg/dL 133* 120* 136* 136*     Results from last 7 days   Lab Units 03/08/22  0607 03/06/22  1752   WBC 10*3/mm3 27.51* 32.03*   HEMOGLOBIN g/dL 7.9* 9.6*   HEMATOCRIT % 22.0* 30.1*   PLATELETS 10*3/mm3 264 347         Results from last 7 days   Lab Units 03/06/22  1752   TROPONIN T ng/mL 0.255*     Results from last 7 days   Lab Units 03/06/22  1752   BLOODCX  No growth at 2 days  No growth at 2 days         I have reviewed the patient's laboratory results.    Radiology results:    CT Abdomen Pelvis Without Contrast    Result Date: 3/6/2022  FINAL REPORT TECHNIQUE: Axial CT images were performed from the lung bases through the symphysis pubis without IV contrast.  This study was performed with techniques to keep radiation doses as low as reasonably achievable (ALARA). Individualized dose reduction techniques using automated exposure control or adjustment of mA and/or kV according to the patient's size were employed. CLINICAL HISTORY: sepsis FINDINGS: Lack of intravenous contrast limits evaluation of solid abdominal and pelvic organs.  ABDOMEN/PELVIS:  Liver, gallbladder and bile ducts: The unenhanced liver is grossly unremarkable without focal abnormality.  There has been a prior cholecystectomy.  There is no definite biliary duct dilatation. Adrenal glands: The adrenal glands are morphologically unremarkable without suspicious lesion.  Kidneys, ureter and urinary bladder: There is severe bilateral hydronephrosis to the level of the urinary bladder trigone.  There appears to be an ill-defined infiltrating lesion probably arising from the uterine cervix.  There is urinary bladder wall thickening. Spleen: The spleen is normal size.  Pancreas: The pancreas is grossly unremarkable.  GI systems and mesentery: No evidence of bowel obstruction.  The appendix is visualized and unremarkable in appearance.  No significant mesenteric inflammation.  Lymph nodes: No definite  pathologically enlarged abdominal or pelvic lymph nodes present within the limits of this noncontrast enhanced exam.  Vessels: The abdominal aorta is normal in caliber.  The inferior vena cava is unremarkable.  Peritoneum: No free intraperitoneal fluid or pneumoperitoneum.  Pelvic viscera: Refer to above.    Body wall: No body wall contusion. Bones: No acute fracture.     Impression: Findings are concerning for an obstructing mass in the pelvis causing severe bilateral hydronephrosis.  Underlying cervical malignancy is the diagnosis of exclusion. Authenticated by Reuben Farrell MD on 03/06/2022 09:47:12 PM    CT Chest Without Contrast Diagnostic    Result Date: 3/6/2022  FINAL REPORT TECHNIQUE: Axial CT images were obtained from the lung apex to the mid abdomen.  Coronal and sagittal reformatted images generated from the axial data set and provided for interpretation. This study was performed with techniques to keep radiation doses as low as reasonably achievable (ALARA). Individualized dose reduction techniques using automated exposure control or adjustment of mA and/or kV according to the patient's size were employed. CLINICAL HISTORY: sepsis FINDINGS: Chest:  Lungs/Pleura:  There is basal atelectasis.  Vessels: The aorta and main pulmonary artery are normal in caliber. Lymph nodes:  No pathologically enlarged thoracic lymph nodes. Chest Wall:  No chest wall contusion.  Bones:  No acute fracture.     Impression: No acute findings in the chest to account for the patient's symptoms. Authenticated by Reuben Farrell MD on 03/06/2022 09:47:13 PM    MRI Brain Without Contrast    Result Date: 3/7/2022  PROCEDURE: MRI BRAIN WO CONTRAST-  HISTORY: Transient ischemic attack (TIA); N17.9-Acute kidney failure, unspecified; E87.5-Hyperkalemia; G92.8-Other toxic encephalopathy; N85.8-Other specified noninflammatory disorders of uterus; N13.9-Obstructive and reflux uropathy, unspecified; N39.0-Urinary tract infection, site not  specified; E72.20-Disorder of urea cycle metabolism, unspecified  PROCEDURE: Multiplanar multisequence imaging of the brain was performed without the use of intravenous contrast.  COMPARISON: None.  FINDINGS: There is generalized cerebral volume loss. There are FLAIR hyperintensities in the periventricular white matter of both cerebral hemispheres consistent with chronic small vessel ischemic change. There is a punctate area of restricted diffusion in the posterior left frontal lobe consistent with an acute infarct. There is no mass, mass effect or midline shift. There is no hydrocephalus.  The midbrain, edgardo, cerebellum and craniocervical junction are unremarkable. The sella and pituitary gland are within normal limits. The major intracranial vasculature demonstrates the expected flow related signal. The paranasal sinuses are clear.      Impression: Punctate area of restricted diffusion in the posterior left frontal lobe consistent with an acute infarct.    This report was signed and finalized on 3/7/2022 8:16 AM by Pauline Pearce M.D..    I have reviewed the patient's radiology reports.    Medication Review:     I have reviewed the patient's active and prn medications.     Problem List:      Metabolic encephalopathy    Acute renal failure (HCC)    Other hydronephrosis    Pelvic mass    Hyperkalemia    Hyperammonemia (HCC)      Assessment:    1. Acute Metabolic Encephalopathy, POA  2. Acute Posterior Left Frontal Lobe Stroke, POA  3. Acute Renal Failure, POA  4. Severe, Bilateral Hydronephrosis, POA  5. Obstructing Pelvic Mass, POA  6. Hyperkalemia, POA  7. Hyperammonemia, POA  8. Hyperphosphatemia, POA  9. Elevated Troponin, POA  10. Essential Hypertension  11. Hyperlipidemia  12. Seizure Disorder  13. Hypothyroidism  14. Chronic Bilateral Lower Extremity Lymphedema  15. Morbid Obesity, BMI 39    Plan:    Acute encephalopathy  Acute left posterior frontal lobe stroke  -Patient encephalopathy continues to  remain the same.  -MRI revealed acute posterior left frontal lobe stroke  -Neurology has been consulted.   -Will begin rectal Aspirin and plan for daily Aspirin  -Atorvastatin ordered and will begin when able to tolerate po intake  -Echocardiogram ordered and pending.  -Continuous telemetry    Seizure disorder  -On low-dose phenytoin.    Acute renal failure  Hyperkalemia  Obstructing pelvic mass  Severe, Bilateral Hydronephrosis  -Nephrology consulted, recommendations and assistance greatly appreciated  -Medically treating hyperkalemia at this time  -Urology consulted for possible urostomy tube placement and hicks catheter placement  - On IVF     Hyperphosphatemia  Hyperammonemia  Hyperkalemia  -Treat medically as above    -Patient has a very, very poor prognosis. Family has been talked to regarding the complexity of her multisystem organ failure.  Discussed the case again with the son at bedside.  Planned meeting later today to speak with hospice/palliative care.   -Dr. Alegria consulted. Assistance greatly appreciated.    DVT Prophylaxis: Heparin  Code Status: DNR/DNI  Diet: NPO  Discharge Plan: To be determined    Jose Luis Escoto MD  03/08/22  19:09 EST    Dictated utilizing Dragon dictation.

## 2022-03-09 NOTE — PLAN OF CARE
Goal Outcome Evaluation:       Visited pt.  No family present at this time.   Had been informed pt's family were going to further discuss Hospice services last pm.      Pt awake and alert.  Indicated she was not having pain.  She was able to ask for water.  Informed her she could have ice chips but I would need to confirm.   Spoke with pt's PCT who was going to bring her some ice chips.  Awaiting family's decision on Hospice care.      PC will continue to follow.

## 2022-03-09 NOTE — PLAN OF CARE
Goal Outcome Evaluation:              Outcome Evaluation: VSS.  Oxygenation maintained on room air.  Patient oriented to self only.  Continued accu-checks and cardiac monitoring as ordered.  IV fluids administered as ordered (see MAR).  NPO with ice chips as ordered.  No acute events noted during shift.  Will continue to monitor patient.

## 2022-03-09 NOTE — CONSULTS
In Patient Consult      Patient Name: Phyllis Chen  : 1950   MRN: [id number]   Admission Date: 3/6/2022  4:45 PM     Admission Diagnosis: Obstructive Uropathy, Pelvic mass    Care Team: Patient Care Team:  Hoa Del Rosario APRN as PCP - General (Family Medicine)    History of Present Illness: Phyllis Chen is a 71 y.o. female who Urology was consulted to see for obstructive uropathy. She is unable to provide history, does not seem to be aware of her admission diagnosis. Per previous chart, she presented to the ER with family on  with confusion, no eating or drinking for days. I have asked the patient if she is aware that she has a mass in her pelvis blocking urinary drainage. She does not provide an answer. She is unable to tell me that last time she urinated on her own and is unsure if she currently can. She denies abdominal pain. She does admit to thirst and a desire for a bath.    No family present.    Subjective      Review of System: Review of Systems   Unable to perform ROS: Mental status change      ROS unable to be obtained reliably due to AMS   Clover Barrera PA-C    Past Medical History:   Past Medical History:   Diagnosis Date   • CHF (congestive heart failure) (HCC)    • Disease of thyroid gland        Past Surgical History:   Past Surgical History:   Procedure Laterality Date   • EYE SURGERY         Family History: No family history on file.    Social History:   Social History     Socioeconomic History   • Marital status:    Tobacco Use   • Smoking status: Never Smoker   Substance and Sexual Activity   • Alcohol use: Never   • Drug use: Never       Medications:     Current Facility-Administered Medications:   •  aspirin tablet 325 mg, 325 mg, Oral, Daily **OR** aspirin suppository 300 mg, 300 mg, Rectal, Daily, Desiree Gurrola DO, 300 mg at 22 7069  •  atorvastatin (LIPITOR) tablet 80 mg, 80 mg, Oral, Nightly, Desiree Gurrola DO  •  carvedilol (COREG)  tablet 12.5 mg, 12.5 mg, Oral, BID With Meals, Ila Bear MD  •  cefTRIAXone (ROCEPHIN) IVPB 1 g/50ml dextrose (premix), 1 g, Intravenous, Q24H, Ila Bear MD, Last Rate: 100 mL/hr at 03/08/22 1317, 1 g at 03/08/22 1317  •  heparin (porcine) 5000 UNIT/ML injection 5,000 Units, 5,000 Units, Subcutaneous, Q12H, Ila Bear MD, 5,000 Units at 03/09/22 1021  •  lactulose (CHRONULAC) 10 GM/15ML solution 10 g, 10 g, Oral, BID, Ila Bear MD  •  levothyroxine (SYNTHROID, LEVOTHROID) tablet 112 mcg, 112 mcg, Oral, Daily, Ila Bear MD  •  phenytoin (DILANTIN) chewable tablet 50 mg, 50 mg, Oral, Q8H, Ila Bear MD  •  sodium bicarbonate 8.4 % 100 mEq in dextrose (D5W) 5 % 1,000 mL infusion, , Intravenous, Continuous, Chito Segura MD, Last Rate: 100 mL/hr at 03/09/22 0940, New Bag at 03/09/22 0940  •  sodium bicarbonate tablet 1,300 mg, 1,300 mg, Oral, 4x Daily, Chito Segura MD  •  sodium chloride 0.9 % flush 10 mL, 10 mL, Intravenous, PRN, Desiree Elmore MD  •  sodium chloride 0.9 % flush 10 mL, 10 mL, Intravenous, Q12H, Desiree Gurrola DO, 10 mL at 03/08/22 2200  •  sodium chloride 0.9 % flush 10 mL, 10 mL, Intravenous, PRN, Desiree Gurrola DO  •  sodium polystyrene (KAYEXALATE) 15 GM/60ML suspension 60 g, 60 g, Oral, Once, Chito Segura MD    Allergies:   Allergies   Allergen Reactions   • Contrast Dye Anaphylaxis   • Nsaids Dizziness   • Aspirin Unknown - Low Severity   • Codeine Unknown - High Severity and Other (See Comments)   • Penicillins Unknown - Low Severity       Objective     Physical Exam:   Vital Signs:   Vitals:    03/08/22 2243 03/09/22 0404 03/09/22 0843 03/09/22 1114   BP: 137/79 144/74 144/75 154/82   BP Location: Left arm Left arm Left arm Left arm   Patient Position: Lying Lying Lying Lying   Pulse: 87 80 77 80   Resp: 18 18 18 18   Temp: 96.8 °F (36 °C) 96.4 °F (35.8 °C) 97 °F (36.1 °C) 97.4 °F (36.3 °C)   TempSrc: Axillary Oral Axillary Oral   SpO2:   92%  96%   Weight:  104 kg (228 lb 13.4 oz)     Height:         Body mass index is 39.28 kg/m².     Physical Exam  Vitals and nursing note reviewed.   Constitutional:       General: She is not in acute distress.     Appearance: She is obese. She is ill-appearing.   HENT:      Mouth/Throat:      Mouth: Mucous membranes are dry.   Eyes:      General: No scleral icterus.     Extraocular Movements: Extraocular movements intact.   Cardiovascular:      Rate and Rhythm: Normal rate.   Pulmonary:      Effort: Pulmonary effort is normal. No respiratory distress.   Abdominal:      Palpations: Abdomen is soft.      Tenderness: There is no guarding.   Neurological:      Mental Status: She is disoriented.      Comments: Oriented to person only         Labs:   I/O last 3 completed shifts:  In: 4398.1 [I.V.:4398.1]  Out: -     Lab Results   Component Value Date    GLUCOSE 119 (H) 03/09/2022    CALCIUM 8.3 (L) 03/09/2022     (H) 03/09/2022    K 3.8 03/09/2022    CO2 22.8 03/09/2022     03/09/2022     (H) 03/09/2022    CREATININE 9.90 (H) 03/09/2022    EGFRIFNONA 79 11/14/2021    BCR 15.3 03/09/2022    ANIONGAP 25.2 (H) 03/09/2022       Lab Results   Component Value Date    WBC 23.90 (H) 03/09/2022    HGB 8.0 (L) 03/09/2022    HCT 23.1 (L) 03/09/2022    MCV 90.9 03/09/2022     03/09/2022       No results found for: URINECX    Brief Urine Lab Results     None          Radiographic Studies  CT Abdomen Pelvis Without Contrast    Result Date: 3/6/2022  Findings are concerning for an obstructing mass in the pelvis causing severe bilateral hydronephrosis.  Underlying cervical malignancy is the diagnosis of exclusion. Authenticated by Reuben Farrell MD on 03/06/2022 09:47:12 PM    CT Head Without Contrast    Result Date: 3/6/2022  Low attenuation lesion in the inferior right frontal lobe may represent encephalomalacia.  This can be further evaluated with MRI if clinically warranted.. Authenticated by Reuben Farrell MD on  03/06/2022 07:00:59 PM    CT Chest Without Contrast Diagnostic    Result Date: 3/6/2022  No acute findings in the chest to account for the patient's symptoms. Authenticated by Reuben Farrell MD on 03/06/2022 09:47:13 PM    MRI Brain Without Contrast    Result Date: 3/7/2022  Punctate area of restricted diffusion in the posterior left frontal lobe consistent with an acute infarct.    This report was signed and finalized on 3/7/2022 8:16 AM by Pauline Pearce M.D..    XR Chest 1 View    Result Date: 3/7/2022  No acute cardiopulmonary process.  Continued followup is recommended.  This report was signed and finalized on 3/7/2022 8:16 AM by Pauline Pearce M.D..      Patient Active Problem List   Diagnosis   • Acute renal failure (HCC)   • Metabolic encephalopathy   • Other hydronephrosis   • Pelvic mass   • Hyperkalemia   • Hyperammonemia (HCC)       Assessment / Plan      Assessment/Plan:   71-year-old female with obstructive uropathy due to pelvic mass.  Etiology of the mass is favored to be cervical cancer. Extensive discussions have taken place between the patient and her family members. Gyn currently recommends conservative care only and would not remove the mass for aggressive therapy. Bilateral nephrostomy tubes had therefore been recommended due to obstruction. Per previous documentation, the patient has expressed desire for less aggressive medical care at the end of life. It is unclear if HD and/or nephrostomy tubes would be her desire to prolong her life.     I called the patient's son, Ramón, and discussed this further with him. I have advised him that bilateral nephrostomy tubes would drain her kidneys of urine and prolong her life. I have advised that the surgery involves placing tubes into the kidneys, through the back, and would require IR guidance. Per Dr. David, this placement could not be performed at [facility]. I advised the son of this as well that she would require transfer to higher level of care  "for tubes. Her son is unsure if she would want to have bilateral drainage bags retained for the rest of her life. He is also unsure about the option for dialysis. I have reviewed Nephrology's notes and Dr. Segura said this patient is \"not a candidate for dialysis\" on 03/07/22.    The patient's son wants to discuss this further with family overnight before deciding on whether to proceed with life-prolonging treatment of nephrostomy tubes. Should family choose to go home with hospice, nephrostomy tubes should not be placed.     Follow Up:   We will round on the patient again tomorrow to hear family's final decision    Clover Barrera PA-C  [facility]   "

## 2022-03-10 LAB
ANION GAP SERPL CALCULATED.3IONS-SCNC: 23.5 MMOL/L (ref 5–15)
BASOPHILS # BLD AUTO: 0.08 10*3/MM3 (ref 0–0.2)
BASOPHILS NFR BLD AUTO: 0.3 % (ref 0–1.5)
BH CV ECHO MEAS - AO MAX PG (FULL): 1.1 MMHG
BH CV ECHO MEAS - AO MAX PG: 7 MMHG
BH CV ECHO MEAS - AO MEAN PG (FULL): 1 MMHG
BH CV ECHO MEAS - AO MEAN PG: 3 MMHG
BH CV ECHO MEAS - AO ROOT AREA (BSA CORRECTED): 1.1
BH CV ECHO MEAS - AO ROOT AREA: 3.8 CM^2
BH CV ECHO MEAS - AO ROOT DIAM: 2.2 CM
BH CV ECHO MEAS - AO V2 MAX: 131 CM/SEC
BH CV ECHO MEAS - AO V2 MEAN: 76.8 CM/SEC
BH CV ECHO MEAS - AO V2 VTI: 21.3 CM
BH CV ECHO MEAS - ASC AORTA: 3.3 CM
BH CV ECHO MEAS - AVA(I,A): 3.1 CM^2
BH CV ECHO MEAS - AVA(I,D): 3.1 CM^2
BH CV ECHO MEAS - AVA(V,A): 2.6 CM^2
BH CV ECHO MEAS - AVA(V,D): 2.6 CM^2
BH CV ECHO MEAS - BSA(HAYCOCK): 2.2 M^2
BH CV ECHO MEAS - BSA: 2.1 M^2
BH CV ECHO MEAS - BZI_BMI: 38.4 KILOGRAMS/M^2
BH CV ECHO MEAS - BZI_METRIC_HEIGHT: 162.6 CM
BH CV ECHO MEAS - BZI_METRIC_WEIGHT: 101.6 KG
BH CV ECHO MEAS - EDV(CUBED): 79.5 ML
BH CV ECHO MEAS - EDV(TEICH): 83.1 ML
BH CV ECHO MEAS - EF(CUBED): 91.4 %
BH CV ECHO MEAS - EF(TEICH): 86.6 %
BH CV ECHO MEAS - ESV(CUBED): 6.9 ML
BH CV ECHO MEAS - ESV(TEICH): 11.2 ML
BH CV ECHO MEAS - FS: 55.8 %
BH CV ECHO MEAS - IVS/LVPW: 1.3
BH CV ECHO MEAS - IVSD: 1.2 CM
BH CV ECHO MEAS - LA DIMENSION: 3.3 CM
BH CV ECHO MEAS - LA/AO: 1.5
BH CV ECHO MEAS - LAD MAJOR: 4.1 CM
BH CV ECHO MEAS - LAT PEAK E' VEL: 7.7 CM/SEC
BH CV ECHO MEAS - LATERAL E/E' RATIO: 14.9
BH CV ECHO MEAS - LV MASS(C)D: 152.6 GRAMS
BH CV ECHO MEAS - LV MASS(C)DI: 74.3 GRAMS/M^2
BH CV ECHO MEAS - LV MAX PG: 5.9 MMHG
BH CV ECHO MEAS - LV MEAN PG: 2 MMHG
BH CV ECHO MEAS - LV V1 MAX: 121 CM/SEC
BH CV ECHO MEAS - LV V1 MEAN: 65.1 CM/SEC
BH CV ECHO MEAS - LV V1 VTI: 23.3 CM
BH CV ECHO MEAS - LVIDD: 4.3 CM
BH CV ECHO MEAS - LVIDS: 1.9 CM
BH CV ECHO MEAS - LVOT AREA (M): 2.8 CM^2
BH CV ECHO MEAS - LVOT AREA: 2.8 CM^2
BH CV ECHO MEAS - LVOT DIAM: 1.9 CM
BH CV ECHO MEAS - LVPWD: 0.9 CM
BH CV ECHO MEAS - MED PEAK E' VEL: 14 CM/SEC
BH CV ECHO MEAS - MEDIAL E/E' RATIO: 8.1
BH CV ECHO MEAS - MR MAX PG: 147 MMHG
BH CV ECHO MEAS - MR MAX VEL: 607 CM/SEC
BH CV ECHO MEAS - MR MEAN PG: 86 MMHG
BH CV ECHO MEAS - MR MEAN VEL: 440 CM/SEC
BH CV ECHO MEAS - MR VTI: 201 CM
BH CV ECHO MEAS - MV A MAX VEL: 66 CM/SEC
BH CV ECHO MEAS - MV DEC TIME: 0.16 SEC
BH CV ECHO MEAS - MV E MAX VEL: 114 CM/SEC
BH CV ECHO MEAS - MV E/A: 1.7
BH CV ECHO MEAS - MV MAX PG: 4.9 MMHG
BH CV ECHO MEAS - MV MEAN PG: 3 MMHG
BH CV ECHO MEAS - MV V2 MAX: 111 CM/SEC
BH CV ECHO MEAS - MV V2 MEAN: 77.2 CM/SEC
BH CV ECHO MEAS - MV V2 VTI: 17.1 CM
BH CV ECHO MEAS - MVA(VTI): 3.9 CM^2
BH CV ECHO MEAS - PA ACC SLOPE: 530 CM/SEC^2
BH CV ECHO MEAS - PA ACC TIME: 0.12 SEC
BH CV ECHO MEAS - PA MAX PG: 3.2 MMHG
BH CV ECHO MEAS - PA MEAN PG: 2 MMHG
BH CV ECHO MEAS - PA PR(ACCEL): 23.7 MMHG
BH CV ECHO MEAS - PA V2 MAX: 88.9 CM/SEC
BH CV ECHO MEAS - PA V2 MEAN: 67.1 CM/SEC
BH CV ECHO MEAS - PA V2 VTI: 18.3 CM
BH CV ECHO MEAS - PULM A REVS DUR: 0.13 SEC
BH CV ECHO MEAS - SI(AO): 39.4 ML/M^2
BH CV ECHO MEAS - SI(CUBED): 35.4 ML/M^2
BH CV ECHO MEAS - SI(LVOT): 32.2 ML/M^2
BH CV ECHO MEAS - SI(TEICH): 35 ML/M^2
BH CV ECHO MEAS - SV(AO): 81 ML
BH CV ECHO MEAS - SV(CUBED): 72.6 ML
BH CV ECHO MEAS - SV(LVOT): 66.1 ML
BH CV ECHO MEAS - SV(TEICH): 71.9 ML
BH CV ECHO MEASUREMENTS AVERAGE E/E' RATIO: 10.51
BUN SERPL-MCNC: 143 MG/DL (ref 8–23)
BUN/CREAT SERPL: 15.1 (ref 7–25)
CALCIUM SPEC-SCNC: 7.8 MG/DL (ref 8.6–10.5)
CHLORIDE SERPL-SCNC: 100 MMOL/L (ref 98–107)
CO2 SERPL-SCNC: 23.5 MMOL/L (ref 22–29)
CREAT SERPL-MCNC: 9.47 MG/DL (ref 0.57–1)
DEPRECATED RDW RBC AUTO: 50.4 FL (ref 37–54)
EGFRCR SERPLBLD CKD-EPI 2021: 4.1 ML/MIN/1.73
EOSINOPHIL # BLD AUTO: 0.6 10*3/MM3 (ref 0–0.4)
EOSINOPHIL NFR BLD AUTO: 2.6 % (ref 0.3–6.2)
ERYTHROCYTE [DISTWIDTH] IN BLOOD BY AUTOMATED COUNT: 14.8 % (ref 12.3–15.4)
GLUCOSE BLDC GLUCOMTR-MCNC: 107 MG/DL (ref 70–130)
GLUCOSE BLDC GLUCOMTR-MCNC: 127 MG/DL (ref 70–130)
GLUCOSE BLDC GLUCOMTR-MCNC: 83 MG/DL (ref 70–130)
GLUCOSE BLDC GLUCOMTR-MCNC: 95 MG/DL (ref 70–130)
GLUCOSE SERPL-MCNC: 86 MG/DL (ref 65–99)
HCT VFR BLD AUTO: 23.1 % (ref 34–46.6)
HGB BLD-MCNC: 8 G/DL (ref 12–15.9)
IMM GRANULOCYTES # BLD AUTO: 0.31 10*3/MM3 (ref 0–0.05)
IMM GRANULOCYTES NFR BLD AUTO: 1.4 % (ref 0–0.5)
LEFT ATRIUM VOLUME INDEX: 19 ML/M^2
LEFT ATRIUM VOLUME: 39 ML
LYMPHOCYTES # BLD AUTO: 3.3 10*3/MM3 (ref 0.7–3.1)
LYMPHOCYTES NFR BLD AUTO: 14.4 % (ref 19.6–45.3)
MAXIMAL PREDICTED HEART RATE: 151 BPM
MCH RBC QN AUTO: 32.3 PG (ref 26.6–33)
MCHC RBC AUTO-ENTMCNC: 34.6 G/DL (ref 31.5–35.7)
MCV RBC AUTO: 93.1 FL (ref 79–97)
MONOCYTES # BLD AUTO: 0.93 10*3/MM3 (ref 0.1–0.9)
MONOCYTES NFR BLD AUTO: 4.1 % (ref 5–12)
NEUTROPHILS NFR BLD AUTO: 17.66 10*3/MM3 (ref 1.7–7)
NEUTROPHILS NFR BLD AUTO: 77.2 % (ref 42.7–76)
NRBC BLD AUTO-RTO: 0 /100 WBC (ref 0–0.2)
PLATELET # BLD AUTO: 184 10*3/MM3 (ref 140–450)
PMV BLD AUTO: 9.2 FL (ref 6–12)
POTASSIUM SERPL-SCNC: 3.6 MMOL/L (ref 3.5–5.2)
RBC # BLD AUTO: 2.48 10*6/MM3 (ref 3.77–5.28)
SODIUM SERPL-SCNC: 147 MMOL/L (ref 136–145)
STRESS TARGET HR: 128 BPM
WBC NRBC COR # BLD: 22.88 10*3/MM3 (ref 3.4–10.8)

## 2022-03-10 PROCEDURE — 25010000002 HEPARIN (PORCINE) PER 1000 UNITS: Performed by: HOSPITALIST

## 2022-03-10 PROCEDURE — 25010000002 CEFTRIAXONE SODIUM-DEXTROSE 1-3.74 GM-%(50ML) RECONSTITUTED SOLUTION: Performed by: HOSPITALIST

## 2022-03-10 PROCEDURE — 85025 COMPLETE CBC W/AUTO DIFF WBC: CPT | Performed by: FAMILY MEDICINE

## 2022-03-10 PROCEDURE — 82962 GLUCOSE BLOOD TEST: CPT

## 2022-03-10 PROCEDURE — 80048 BASIC METABOLIC PNL TOTAL CA: CPT | Performed by: FAMILY MEDICINE

## 2022-03-10 PROCEDURE — 99233 SBSQ HOSP IP/OBS HIGH 50: CPT | Performed by: FAMILY MEDICINE

## 2022-03-10 PROCEDURE — 99232 SBSQ HOSP IP/OBS MODERATE 35: CPT | Performed by: PHYSICIAN ASSISTANT

## 2022-03-10 RX ADMIN — HEPARIN SODIUM 5000 UNITS: 5000 INJECTION, SOLUTION INTRAVENOUS; SUBCUTANEOUS at 21:15

## 2022-03-10 RX ADMIN — LACTULOSE 10 G: 20 SOLUTION ORAL at 09:18

## 2022-03-10 RX ADMIN — SODIUM BICARBONATE: 84 INJECTION, SOLUTION INTRAVENOUS at 21:15

## 2022-03-10 RX ADMIN — LACTULOSE 10 G: 20 SOLUTION ORAL at 21:15

## 2022-03-10 RX ADMIN — PHENYTOIN 50 MG: 50 TABLET, CHEWABLE ORAL at 06:21

## 2022-03-10 RX ADMIN — ATORVASTATIN CALCIUM 80 MG: 80 TABLET, FILM COATED ORAL at 21:15

## 2022-03-10 RX ADMIN — SODIUM BICARBONATE 650 MG TABLET 1300 MG: at 09:18

## 2022-03-10 RX ADMIN — PHENYTOIN 50 MG: 50 TABLET, CHEWABLE ORAL at 21:15

## 2022-03-10 RX ADMIN — CARVEDILOL 12.5 MG: 12.5 TABLET, FILM COATED ORAL at 18:05

## 2022-03-10 RX ADMIN — SODIUM BICARBONATE 650 MG TABLET 1300 MG: at 21:15

## 2022-03-10 RX ADMIN — LEVOTHYROXINE SODIUM 112 MCG: 112 TABLET ORAL at 09:18

## 2022-03-10 RX ADMIN — CEFTRIAXONE 1 G: 1 INJECTION, SOLUTION INTRAVENOUS at 09:19

## 2022-03-10 RX ADMIN — SODIUM BICARBONATE 650 MG TABLET 1300 MG: at 18:06

## 2022-03-10 RX ADMIN — ASPIRIN 325 MG ORAL TABLET 325 MG: 325 PILL ORAL at 09:18

## 2022-03-10 RX ADMIN — CARVEDILOL 12.5 MG: 12.5 TABLET, FILM COATED ORAL at 09:18

## 2022-03-10 RX ADMIN — HEPARIN SODIUM 5000 UNITS: 5000 INJECTION, SOLUTION INTRAVENOUS; SUBCUTANEOUS at 09:18

## 2022-03-10 RX ADMIN — Medication 10 ML: at 21:16

## 2022-03-10 NOTE — PLAN OF CARE
Goal Outcome Evaluation:      Reviewed Urology consult note.  Visited pt's room.  Pt awake and alert.  Son present.  He provide pt with phone so she could see her sister.      I explained I was with the Palliative Care team and I had reviewed the Urology notes which indicated the family was going to discuss the option of placing  nephrostomy tubes.  I encouraged him to make sure the discussion focused on what his mother's wishes would be.   I reminded him his mother would have to go to another hospital to have the surgery.  Once again reminded him the decision should be what the pt would want.    He said he and his family are going to discuss it this evening.       I informed him the PC team will be here tomorrow to help in whatever way we can.

## 2022-03-10 NOTE — CASE MANAGEMENT/SOCIAL WORK
Continued Stay Note  [facility]     Patient Name: Phyllis Chen  MRN: [id number]  Today's Date: 3/10/2022    Admit Date: 3/6/2022     Discharge Plan     Row Name 03/10/22 1237       Plan    Plan Hospice is following,and family plans to do Home Hospice.  Referral has been placed and they will contact the family about equipment needed.               Discharge Codes    No documentation.               Expected Discharge Date and Time     Expected Discharge Date Expected Discharge Time    Mar 9, 2022             Zaynab Spaulding RN

## 2022-03-10 NOTE — PLAN OF CARE
Visited patient in her room this date.  She was awake sitting up in bed.  She appeared to be very comfortable.  Patient did not appear to be able to respond to questions appropriately at this time when asked about home and family so conversation was ended.  No family were present at bedside.    Spoke to pt's son (Jr Ramón, HC surrogate) via phone call.  He reported that the family has decided to NOT have the nephrotomy tube placement.  He shared that he understood it would be a pretty invasive procedure and his mom never wanted to have surgery.  Hospice services was then discussed.  Ramón states that they plan for pt to come home with hospice services, but would need 2 to 3 days to get things ready..  Services discussed to include nurse 1x/week but 24/7 on call available and tech 2 to 3x weekly.  He was informed that they could provide equipment, but did not express any equipment needs at this time.  Additionally,he was informed that discharge would be a medical discussion but pt appeared ready for discharge.  He was informed that hospice would contact him to verify services.    Called [facility]; spoke to Hola.  Update provided that family was agreeable for home hospice services.  She will plan to contact family and f/u with palliative services.  Hospice usually likes a 3 to 4 day supply of comfort medication filled prior to pt's discharge to give them time to make contact with pt's PC.  Dr. Escoto updated.      Received update that family contacted and in agreement with hospice services.  They will be delivering hospital bed, air mattress and bedside commode on Friday afternoon.

## 2022-03-10 NOTE — PROGRESS NOTES
[facility] Progress Note  [facility]. [place]        Patient Name: Phyllis Chen  : 1950  MRN: [id number]   LOS: 4 days    Patient Care Team:  Hoa Del Rosario APRN as PCP - General (Family Medicine)    Chief Complaint:    Chief Complaint   Patient presents with   • Altered Mental Status     EMS stated pts family called concerned that pt was more altered than usual. Stated pt has lost the will to eat and or drink x3 days.      Primary Care Physician:  Hoa Del Rosario APRN  Date of admission: 3/6/2022    Subjective     Interval History:   Events noted from last 24 hours.  Patient is slightly more awake and still cannot answer questions appropriately.  Randomly moving extremities.  It has been noted that multiple discussions with the family members is ongoing about comfort care and hospice.  Urology note reviewed.  Ongoing discussions about comfort care and home hospice, family has not made a decision yet.  Most likely they will have final decision today.  Review of Systems:   As noted above    Objective     Vitals:   Temp:  [97 °F (36.1 °C)-98.8 °F (37.1 °C)] 97.5 °F (36.4 °C)  Heart Rate:  [] 84  Resp:  [16-18] 16  BP: (133-154)/(70-91) 133/70    Intake/Output Summary (Last 24 hours) at 3/10/2022 0750  Last data filed at 3/10/2022 0100  Gross per 24 hour   Intake 720 ml   Output --   Net 720 ml       Physical Exam:    General Appearance: alert, no acute distress   Skin: warm and dry  HEENT: oral mucosa normal, nonicteric sclera  Neck: supple, no JVD  Lungs: CTA  Heart: RRR, normal S1 and S2  Abdomen: soft, tender, nondistended  : no palpable bladder  Extremities: no edema, cyanosis or clubbing  Neuro: normal speech and mental status     Scheduled Meds:     Current Facility-Administered Medications   Medication Dose Route Frequency Provider Last Rate Last Admin   • aspirin tablet 325 mg  325 mg Oral Daily Desiree Gurrola, DO        Or   • aspirin  suppository 300 mg  300 mg Rectal Daily Desiree Gurrola DO   300 mg at 03/07/22 2258   • atorvastatin (LIPITOR) tablet 80 mg  80 mg Oral Nightly Desiree Gurrola DO   80 mg at 03/09/22 2238   • carvedilol (COREG) tablet 12.5 mg  12.5 mg Oral BID With Meals Ila Bear MD       • cefTRIAXone (ROCEPHIN) IVPB 1 g/50ml dextrose (premix)  1 g Intravenous Q24H Ila Bear  mL/hr at 03/09/22 1316 1 g at 03/09/22 1316   • heparin (porcine) 5000 UNIT/ML injection 5,000 Units  5,000 Units Subcutaneous Q12H Ila Bear MD   5,000 Units at 03/09/22 2238   • lactulose (CHRONULAC) 10 GM/15ML solution 10 g  10 g Oral BID Ila Bear MD   10 g at 03/09/22 2238   • levothyroxine (SYNTHROID, LEVOTHROID) tablet 112 mcg  112 mcg Oral Daily Ila Bear MD       • phenytoin (DILANTIN) chewable tablet 50 mg  50 mg Oral Q8H Ila Bear MD   50 mg at 03/10/22 0621   • sodium bicarbonate 8.4 % 100 mEq in dextrose (D5W) 5 % 1,000 mL infusion   Intravenous Continuous Chito Segura  mL/hr at 03/09/22 2250 New Bag at 03/09/22 2250   • sodium bicarbonate tablet 1,300 mg  1,300 mg Oral 4x Daily Chito Segura MD   1,300 mg at 03/09/22 2238   • sodium chloride 0.9 % flush 10 mL  10 mL Intravenous PRN Desiree Elmore MD       • sodium chloride 0.9 % flush 10 mL  10 mL Intravenous Q12H Desiree Gurrola DO   10 mL at 03/09/22 2238   • sodium chloride 0.9 % flush 10 mL  10 mL Intravenous PRN Desiree Gurrola DO       • sodium polystyrene (KAYEXALATE) 15 GM/60ML suspension 60 g  60 g Oral Once Chito Segura MD           aspirin, 325 mg, Oral, Daily   Or  aspirin, 300 mg, Rectal, Daily  atorvastatin, 80 mg, Oral, Nightly  carvedilol, 12.5 mg, Oral, BID With Meals  cefTRIAXone, 1 g, Intravenous, Q24H  heparin (porcine), 5,000 Units, Subcutaneous, Q12H  lactulose, 10 g, Oral, BID  levothyroxine, 112 mcg, Oral, Daily  phenytoin, 50 mg, Oral, Q8H  sodium bicarbonate, 1,300 mg, Oral, 4x Daily  sodium  chloride, 10 mL, Intravenous, Q12H  sodium polystyrene, 60 g, Oral, Once        IV Meds:   custom IV infusion builder, , Last Rate: 100 mL/hr at 03/09/22 2250        Results Reviewed:   I have personally reviewed the results from the time of this admission to 3/10/2022 07:50 EST     Results from last 7 days   Lab Units 03/10/22  0659 03/09/22  1026 03/08/22  0607 03/07/22  0246 03/06/22  1752   SODIUM mmol/L 147* 154* 154*   < > 140   POTASSIUM mmol/L 3.6 3.8 4.2   < > 8.0*   CHLORIDE mmol/L 100 106 109*   < > 107   CO2 mmol/L 23.5 22.8 19.9*   < > 5.1*   BUN mg/dL 143* 151* 150*   < > 163*   CREATININE mg/dL 9.47* 9.90* 9.18*   < > 9.27*   CALCIUM mg/dL 7.8* 8.3* 8.4*   < > 9.9   BILIRUBIN mg/dL  --  0.3 0.3  --  0.5   ALK PHOS U/L  --  137* 133*  --  147*   ALT (SGPT) U/L  --  32 42*  --  43*   AST (SGOT) U/L  --  26 38*  --  42*   GLUCOSE mg/dL 86 119* 133*   < > 136*    < > = values in this interval not displayed.       Estimated Creatinine Clearance: 6.6 mL/min (A) (by C-G formula based on SCr of 9.47 mg/dL (H)).    Results from last 7 days   Lab Units 03/07/22  0246 03/06/22  1752   MAGNESIUM mg/dL  --  2.5*   PHOSPHORUS mg/dL 6.1*  --              Results from last 7 days   Lab Units 03/10/22  0659 03/09/22  1026 03/08/22  0607 03/06/22  1752   WBC 10*3/mm3 22.88* 23.90* 27.51* 32.03*   HEMOGLOBIN g/dL 8.0* 8.0* 7.9* 9.6*   PLATELETS 10*3/mm3 184 233 264 347             Brief Urine Lab Results     None          No results found for: UTPCR    Imaging Results (Last 24 Hours)     Procedure Component Value Units Date/Time    US Carotid Bilateral [[id number]] Resulted: 03/09/22 1852     Updated: 03/09/22 1852              Assessment / Plan     ASSESSMENT:  Assessment/Plan:       Acute renal failure (HCC): Clearly it is secondary to obstructive complement from fairly large abdominal mass, patient has known about it and has decided not to pursue any further.  She had normal renal function few weeks ago and it all  appears that this is acute.  Currently she does have significant hyperkalemia, metabolic acidosis.    Metabolic encephalopathy: She is slightly more responsive today..    Other hydronephrosis: Bilateral hydronephrosis secondary to obstruction, will need nephrostomy tubes.    Pelvic mass: Likely has cervical cancer, and history of refusing any further work-up.    Hyperammonemia (HCC):   Acute left posterior frontal lobe stroke: Patient is slightly more responsive.        Plan:  Continue with the fluids until she is ready to go.  I have recommended hospice to the .  Awaiting the final decision from the family.  I will sign off please call if needed.  Details were also discussed with the hospitalist service.  Further recommendations will depend on clinical course of the patient during the current hospitalization.    I also discussed the details with the nursing staff.  Rest as ordered.      Thank you for involving us in the care of Phyllis Chen.  Please feel free to call with any questions.    Chito Segura MD  03/10/22  07:50 [facility]    [facility]  [phone]      Much of this encounter note is an electronic transcription/translation of spoken language to printed text. The electronic translation of spoken language may permit erroneous, or at times, nonsensical words or phrases to be inadvertently transcribed; Although I have reviewed the note for such errors, some may still exist.

## 2022-03-10 NOTE — PROGRESS NOTES
"    [facility]    Name:  Phyllis Chen   Age:  71 y.o.  Sex:  female  :  1950  MRN:  [id number]   Visit Number:  [id number]  Admission Date:  3/6/2022  Date Of Service:  03/10/22  Primary Care Physician:  Hoa Del Rosario APRN     LOS: 4 days :    Chief Complaint:      Altered mental status    Subjective:    Patient seen and examined at bedside today.  Chart reviewed and events noted.  Patient laying comfortably in bed with no distress.  Patient continues to be pleasantly confused and disoriented.  After introducing myself to her and told her as Dr. Escoto, patient states \" you are the best Doctor\". Patient with circumstantial speech.  Patient does not appear to be in pain and appears very comfortable.  No family at bedside. Vitals stable and afebrile.    Hospital Course:    70 yo F with HTN, Morbid obesity, Seizure disorder, Morbid obesity, Chronic bilateral lower extremity lymphedema that presented to the hospital with altered mental status. CT head without contrast has shown low attenuation lesion in the inferior right frontal lobe may represent encephalomalacia. CT chest without contrast is with no acute findings in the chest to account for patient's symptoms.  CT abdomen without contrast is concerning for obstructing mass in the pelvis causing severe bilateral hydronephrosis.  Underlying cervical malignancy is the diagnosis of exclusion. Patient found to be in acute renal failure with hyperkalemia.     In the ED, gynecology Dr. Camacho discussed the case with ED physician: \"Given the size of the mass and the obstructive component it was felt that the tumor would not be appropriate for surgical intervention.  We will consult on the patient to help with planning and or tissue diagnosis.  Patient would likely require bilateral percutaneous nephrostomy tubes to relieve obstruction.  Will consult urology on inpatient basis.  Attempted possible transfer " "patient there are no beds at any of the tertiary care centers with an appropriate referral distancing.  Multiple discussion with the patient's son at bedside and over the phone.  Given the patient's very guarded and poor prognosis he was open to discussing goals of care and/or end-of-life care with palliative care/hospice.\" Nephrology consulted, urology consulted, hospice consulted, Gyn consulted.    Review of Systems:     All systems were reviewed and negative except as mentioned in subjective, assessment and plan.    Vital Signs:    Temp:  [97.3 °F (36.3 °C)-98.8 °F (37.1 °C)] 97.7 °F (36.5 °C)  Heart Rate:  [68-84] 68  Resp:  [16-18] 17  BP: (118-148)/(58-75) 129/66    Intake and output:    I/O last 3 completed shifts:  In: 2609.9 [P.O.:720; I.V.:1889.9]  Out: -   I/O this shift:  In: 360 [P.O.:360]  Out: -     Physical Examination:    General Appearance:   Awake and alert.  Chronically ill.   Head:  Atraumatic and normocephalic.   Eyes: Conjunctivae and sclerae normal, no icterus. No pallor.   Throat: No oral lesions, no thrush, oral mucosa moist.   Neck: Supple, trachea midline, no thyromegaly.   Lungs:   Breath sounds heard bilaterally equally.  No wheezing or crackles. No Pleural rub or bronchial breathing.   Heart:  Normal S1 and S2, no murmur, no gallop, no rub. No JVD.   Abdomen:   Normal bowel sounds, no masses, no organomegaly. Soft, nontender, nondistended, no rebound tenderness.  Obese.   Extremities: Supple, no edema, no cyanosis, no clubbing.   Skin: No bleeding or rash.   Neurologic:  Awake and alert but confused and disoriented. No facial asymmetry. No tremors.  Moving all 4 extremities with no difficulty.     Laboratory results:    Results from last 7 days   Lab Units 03/10/22  0659 03/09/22  1026 03/08/22  0607 03/07/22  0246 03/06/22  1752   SODIUM mmol/L 147* 154* 154*   < > 140   POTASSIUM mmol/L 3.6 3.8 4.2   < > 8.0*   CHLORIDE mmol/L 100 106 109*   < > 107   CO2 mmol/L 23.5 22.8 19.9*   < > " 5.1*   BUN mg/dL 143* 151* 150*   < > 163*   CREATININE mg/dL 9.47* 9.90* 9.18*   < > 9.27*   CALCIUM mg/dL 7.8* 8.3* 8.4*   < > 9.9   BILIRUBIN mg/dL  --  0.3 0.3  --  0.5   ALK PHOS U/L  --  137* 133*  --  147*   ALT (SGPT) U/L  --  32 42*  --  43*   AST (SGOT) U/L  --  26 38*  --  42*   GLUCOSE mg/dL 86 119* 133*   < > 136*    < > = values in this interval not displayed.     Results from last 7 days   Lab Units 03/10/22  0659 03/09/22  1026 03/08/22  0607   WBC 10*3/mm3 22.88* 23.90* 27.51*   HEMOGLOBIN g/dL 8.0* 8.0* 7.9*   HEMATOCRIT % 23.1* 23.1* 22.0*   PLATELETS 10*3/mm3 184 233 264         Results from last 7 days   Lab Units 03/06/22  1752   TROPONIN T ng/mL 0.255*     Results from last 7 days   Lab Units 03/06/22  1752   BLOODCX  No growth at 3 days  No growth at 3 days         I have reviewed the patient's laboratory results.    Radiology results:    US Carotid Bilateral    Result Date: 3/10/2022  PROCEDURE: US CAROTID BILATERAL-  HISTORY: Stroke; N17.9-Acute kidney failure, unspecified; E87.5-Hyperkalemia; G92.8-Other toxic encephalopathy; N85.8-Other specified noninflammatory disorders of uterus; N13.9-Obstructive and reflux uropathy, unspecified; N39.0-Urinary tract infection, site not specified; E72.20-Disorder of urea cycle metabolism, unspecified  Technique: Real-time imaging was performed of the extracranial carotid arteries in transverse and longitudinal planes, with color duplex evaluation of blood flow velocity. Spectral analysis was performed. The cervicovertebral arteries were also examined. Stenosis evaluation is based on validated velocity criteria.  Right carotid system: CCA: 73 cm/s ICA: 85 cm/s ECA: 77 cm/s Vertebral artery: Antegrade ICA/CCA ratio: 1.2 No visible plaque is identified at the bifurcation.  Left carotid system: CCA: 77 cm/s ICA: 100 cm/s ECA: 79 cm/s Vertebral artery: Antegrade ICA/CCA ratio: 1.3 No visible plaque is identified at the bifurcation.       Impression: Normal  carotid artery duplex.  This report was signed and finalized on 3/10/2022 8:30 AM by Pauline Pearce M.D..    I have reviewed the patient's radiology reports.    Medication Review:     I have reviewed the patient's active and prn medications.     Problem List:      Metabolic encephalopathy    Acute renal failure (HCC)    Other hydronephrosis    Pelvic mass    Hyperkalemia    Hyperammonemia (HCC)      Assessment:    1. Acute Metabolic Encephalopathy, POA  2. Acute Posterior Left Frontal Lobe Stroke, POA  3. Acute Renal Failure, POA  4. Severe, Bilateral Hydronephrosis, POA  5. Obstructing Pelvic Mass, POA  6. Hyperkalemia, POA  7. Hyperammonemia, POA  8. Hyperphosphatemia, POA  9. Elevated Troponin, POA  10. Essential Hypertension  11. Hyperlipidemia  12. Seizure Disorder  13. Hypothyroidism  14. Chronic Bilateral Lower Extremity Lymphedema  15. Morbid Obesity, BMI 39    Plan:    Acute encephalopathy  Acute left posterior frontal lobe stroke  -Patient encephalopathy continues to remain the same.  -MRI revealed acute posterior left frontal lobe stroke  -Neurology has been consulted.  No intervention at this time.  -Continue aspirin and statin.  -Echocardiogram showed EF 65 to 70%.  Mild mitral regurgitation and Negative bubble study  -Normal carotid artery duplex.  -Cont telemetry    Seizure disorder  -On low-dose phenytoin.    Acute renal failure  Hyperkalemia  Obstructing pelvic mass  Severe, Bilateral Hydronephrosis  -Nephrology consulted, recommendations and assistance greatly appreciated  -Medically treating hyperkalemia at this time.  -Plan to continue fluids until discharge.  -Urology consulted for possible urostomy tube placement and hicks catheter placement.  Procedures were discussed with the patient's son by urology.  Family does not want the procedure and have decided on home with hospice.  - On IVF     Hyperphosphatemia  Hyperammonemia  Hyperkalemia  -Treat medically as above    -Patient has a very,  very poor prognosis. Family has been talked to regarding the complexity of her multisystem organ failure.   -Dr. Alegria consulted. Assistance greatly appreciated.    -Family has decided on home with hospice and not to have the nephrostomy tubes placement done.  Discussions with palliative care and urology.  I called and discussed with the son again today who confirmed that they did not want any life prolonging procedures/interventions at this time and would like to go home with hospice. Referral sent for home hospice services and working on getting equipment ready at home.  Patient will need a Covid test before discharge.  Possible discharge with home with hospice in 24 to 48 hours.     DVT Prophylaxis: Heparin  Code Status: DNR/DNI  Diet: Renal puréed diet  Discharge Plan: Home with hospice, referral sent, pending equipment set up at home.    Jose Luis Escoto MD  03/10/22  17:23 EST    Dictated utilizing Dragon dictation.

## 2022-03-10 NOTE — CONSULTS
[facility]    GOALS OF CARE DISCUSSION CONSULT      Name:  Phyllis Chen   Age:  71 y.o.  Sex:  female  :  1950  MRN:  [id number]   Visit Number:  [id number]  Date of Service:  3/8/2022  Date of Admission:  3/6/2022  Primary Care Physician:  Hoa Del Rosario APRN      Consulting Physician:    Dr. Jose Luis Escoto    Reason For Consult:    Addressing goals of care in the setting of complex clinical course and progressive decline    Hospital Diagnosis:    • Obstructing pelvic mass, consistent with malignancy  • Severe bilateral hydronephrosis secondary to above  • Acute renal failure  • Acute posterior left frontal lobe stroke  • Acute metabolic encephalopathy secondary to above  • Chronic bilateral lower extremity lymphedema  • Seizure disorder  • Progressive functional decline and severe debility    Brief Summary:    I reviewed in detail patient's clinical course on admission and sequence of events since then as reflected by history and physical, progress notes, consults, staff input and work-up.  Mrs. Chen is a 71 years old female with history of hypertension and hyperlipidemia who was admitted with a diagnosis of acute posterior left frontal lobe stroke with secondary acute metabolic encephalopathy.  Neurology consultant recommended conservative management.  Meanwhile, work-up demonstrated a large obstructing pelvic mass with secondary bilateral severe hydronephrosis and acute renal failure.  Palliative care consultation was requested to address goals of care given above presentation.  Advance Care Planning   1.  Determination of decisional capacity:     ·  Patient lacks decisional capacity due to increasing lethargy and inability to articulate her wishes or engage in a conversation    2.  Advanced Directives:    · DO NOT RESUSCITATE, DO NOT INTUBATE    3.  Patient & Family Meeting:    · Meeting attendees: Patient's , son and daughter on speaker phone.  · Meeting location:  Third floor waiting room    Summary of the discussion:    · After initial introductions and presenting the role of palliative care team, family were encouraged to describe patient's clinical course prior to presentation.  They stated that she was in good state of health except for noting increasing fatigue during the past few weeks.  I presented an outline regarding her diagnosis, sequence of events since admission, work-up and consultations.  They are well-informed regarding her condition, however they are shocked with the new findings and unsure regarding future plans.  · In response to my question regarding patient's CODE STATUS, they confirmed maintaining DO NOT RESUSCITATE, DO NOT INTUBATE status  · On the other hand, we had a lengthy and detailed discussion regarding future plans.  I encouraged them to have patient's previously expressed wishes and preferences at the center of their decisions.  I explained that imaging is consistent with malignancy with mass-effect on the ureters.  GYN consultant did not recommend surgery; urology consultation for possible stents' placement is in progress.  They stated that patient would [not want to give up], however they felt that she would elect to avoid aggressive measures in the event of poor prognosis or compromised quality of life.  · I encouraged family to have further private discussions among them and reassured them that they will be updated regarding further work-up and consultants input.  · Was communicated to staff, case management and primary attending who concurred and will follow on further plans.    Conclusions:    · Code status: DO NOT RESUSCITATE, DO NOT INTUBATE  · Medical interventions: Family contemplating proceeding with comfort measures; at present, continue current management  · Advanced directives: EMS DNR will be completed prior to discharge       I appreciate the opportunity to participate in Mrs. Caldera's care.  Please feel free to contact me for  any upcoming questions or concerns as need arises.     Total time spent in counseling and advanced care planning discussion per above documentation 75 min.     Dragon system was utilized for this dictation; therefore unintended spelling or expressions may be noted in the body of this document.

## 2022-03-10 NOTE — PROGRESS NOTES
[facility]  PROGRESS NOTE    Name:  Phyllis Chen   Age:  71 y.o.  Sex:  female  :  1950  MRN:  [id number]   Visit Number:  [id number]  Admission Date:  3/6/2022  Date Of Service:  03/10/22  Primary Care Physician:  Hoa Del Rosario APRN     LOS: 4 days :  Patient Care Team:  Hoa Del Rosario APRN as PCP - General (Family Medicine):    Chief Complaint:    AMS, renal failure, pelvic mass    Subjective / Interval History:   Patient is known to have a large pelvic mass, likely from cervical malignancy.  This mass has caused obstructive uropathy and kidney failure.  Family has been deciding during her hospital stay whether or not to proceed with aggressive treatments such as nephrostomy tubes, or if the patient's wishes would be to have comfort care and home hospice.  Yesterday, I discussed with the patient's son what nephrostomy tubes would entail.  He said he would speak with the rest of the family over the next 24 hours and decide whether or not Phyllis would want such care, or would prefer to go home.  I called Ramón again today.  Ramón Almaraz states that the family has decided that the patient would not want this type of care at the end of her life.  There is a family have decided that the patient would prefer home hospice, comfort care only.    I talked with Phyllis today again.  She is sleeping on my initial exam at 1230.  She wakes easily but is very slow to respond to questions, seems more confused today than yesterday.  She denies any pain.  She has a lunch tray present but states she is not hungry.      Review of Systems:   Review of Systems   Unable to perform ROS: Mental status change   She does deny pain, nausea, or any discomfort    Vital Signs:    Temp:  [97.2 °F (36.2 °C)-98.8 °F (37.1 °C)] 97.5 °F (36.4 °C)  Heart Rate:  [] 68  Resp:  [16-18] 17  BP: (118-148)/(58-91) 118/58    Intake and output:    I/O last 3 completed shifts:  In: 2609.9 [P.O.:720;  I.V.:1889.9]  Out: -   No intake/output data recorded.    Physical Examination:  Physical Exam  Vitals and nursing note reviewed.   Constitutional:       General: She is not in acute distress.     Comments: Chronically ill-appearing   HENT:      Head: Normocephalic.      Mouth/Throat:      Mouth: Mucous membranes are moist.   Eyes:      General: No scleral icterus.     Extraocular Movements: Extraocular movements intact.   Cardiovascular:      Rate and Rhythm: Normal rate and regular rhythm.   Pulmonary:      Effort: Pulmonary effort is normal. No respiratory distress.   Abdominal:      Palpations: Abdomen is soft.      Tenderness: There is no abdominal tenderness. There is no guarding.   Neurological:      Comments: Oriented to person only. Significant delay in answering questions.    Psychiatric:      Comments: Calm, no distress         Laboratory results:    Lab Results (last 24 hours)     Procedure Component Value Units Date/Time    POC Glucose Once [[id number]]  (Normal) Collected: 03/10/22 1104    Specimen: Blood Updated: 03/10/22 1136     Glucose 127 mg/dL      Comment: Serial Number: [id number]:  [id number]       Basic Metabolic Panel [[id number]]  (Abnormal) Collected: 03/10/22 0659    Specimen: Blood Updated: 03/10/22 0742     Glucose 86 mg/dL       mg/dL      Creatinine 9.47 mg/dL      Sodium 147 mmol/L      Potassium 3.6 mmol/L      Chloride 100 mmol/L      CO2 23.5 mmol/L      Calcium 7.8 mg/dL      BUN/Creatinine Ratio 15.1     Anion Gap 23.5 mmol/L      eGFR 4.1 mL/min/1.73      Comment: <15 Indicative of kidney failure       Narrative:      GFR Normal >60  Chronic Kidney Disease <60  Kidney Failure <15      CBC & Differential [[id number]]  (Abnormal) Collected: 03/10/22 0659    Specimen: Blood Updated: 03/10/22 0716    Narrative:      The following orders were created for panel order CBC & Differential.  Procedure                               Abnormality         Status                      ---------                               -----------         ------                     CBC Auto Differential[[id number]]        Abnormal            Final result                 Please view results for these tests on the individual orders.    CBC Auto Differential [[id number]]  (Abnormal) Collected: 03/10/22 0659    Specimen: Blood Updated: 03/10/22 0715     WBC 22.88 10*3/mm3      RBC 2.48 10*6/mm3      Hemoglobin 8.0 g/dL      Hematocrit 23.1 %      MCV 93.1 fL      MCH 32.3 pg      MCHC 34.6 g/dL      RDW 14.8 %      RDW-SD 50.4 fl      MPV 9.2 fL      Platelets 184 10*3/mm3      Neutrophil % 77.2 %      Lymphocyte % 14.4 %      Monocyte % 4.1 %      Eosinophil % 2.6 %      Basophil % 0.3 %      Immature Grans % 1.4 %      Neutrophils, Absolute 17.66 10*3/mm3      Lymphocytes, Absolute 3.30 10*3/mm3      Monocytes, Absolute 0.93 10*3/mm3      Eosinophils, Absolute 0.60 10*3/mm3      Basophils, Absolute 0.08 10*3/mm3      Immature Grans, Absolute 0.31 10*3/mm3      nRBC 0.0 /100 WBC     POC Glucose Once [[id number]]  (Normal) Collected: 03/10/22 0603    Specimen: Blood Updated: 03/10/22 0626     Glucose 83 mg/dL      Comment: Serial Number: [id number]:  [id number]       POC Glucose Once [[id number]]  (Normal) Collected: 03/09/22 2128    Specimen: Blood Updated: 03/09/22 2131     Glucose 95 mg/dL      Comment: Serial Number: [id number]:  [id number]       Blood Culture - Blood, Arm, Right [[id number]]  (Normal) Collected: 03/06/22 1752    Specimen: Blood from Arm, Right Updated: 03/09/22 1803     Blood Culture No growth at 3 days    Blood Culture - Blood, Arm, Left [[id number]]  (Normal) Collected: 03/06/22 1752    Specimen: Blood from Arm, Left Updated: 03/09/22 1803     Blood Culture No growth at 3 days    POC Glucose Once [[id number]]  (Normal) Collected: 03/09/22 1651    Specimen: Blood Updated: 03/09/22 1656     Glucose 117 mg/dL      Comment: Serial Number: [id number]:  [id number]             I have  reviewed the patient's laboratory results.    Radiology results:    Imaging Results (Last 24 Hours)     Procedure Component Value Units Date/Time    US Carotid Bilateral [[id number]] Collected: 03/10/22 0830     Updated: 03/10/22 0832    Narrative:      PROCEDURE: US CAROTID BILATERAL-     HISTORY: Stroke; N17.9-Acute kidney failure, unspecified;  E87.5-Hyperkalemia; G92.8-Other toxic encephalopathy; N85.8-Other  specified noninflammatory disorders of uterus; N13.9-Obstructive and  reflux uropathy, unspecified; N39.0-Urinary tract infection, site not  specified; E72.20-Disorder of urea cycle metabolism, unspecified     Technique: Real-time imaging was performed of the extracranial carotid  arteries in transverse and longitudinal planes, with color duplex  evaluation of blood flow velocity. Spectral analysis was performed. The  cervicovertebral arteries were also examined. Stenosis evaluation is  based on validated velocity criteria.     Right carotid system:  CCA: 73 cm/s  ICA: 85 cm/s  ECA: 77 cm/s  Vertebral artery: Antegrade  ICA/CCA ratio: 1.2  No visible plaque is identified at the bifurcation.     Left carotid system:  CCA: 77 cm/s  ICA: 100 cm/s  ECA: 79 cm/s  Vertebral artery: Antegrade  ICA/CCA ratio: 1.3  No visible plaque is identified at the bifurcation.          Impression:      Normal carotid artery duplex.     This report was signed and finalized on 3/10/2022 8:30 AM by Pauline Pearce M.D..          I have reviewed the patient's radiology reports.       Assessment/Plan    Metabolic encephalopathy    Acute renal failure (HCC)    Other hydronephrosis    Pelvic mass    Hyperkalemia    Hyperammonemia (HCC)      In summary, patient is a 71-year-old female with pelvic mass with likely malignant cervical etiology, presenting with confusion due to obstructive uropathy leading to renal failure.  Bilateral nephrostomy tubes have been discussed as a final, life prolonging measure.  It was unclear if this  would be the patient's desire at the end of her life and family has been discussing it for the past day.  Today, the patient's son, who is acting as a representative for the family, stated that they have decided the patient would not want bilateral nephrostomy tubes to prolong her life and would have preferred noninvasive measures, comfort care only.  Given this, they have decided to go home with hospice and that is currently being arranged.    Plan:  Home with hospice, no further intervention    Clover Barrera PA-C  03/10/22  13:05 EST

## 2022-03-10 NOTE — PLAN OF CARE
Goal Outcome Evaluation:      Patient pleased with advancement of diet. Drinking water and eating pudding this evening. Patient with flat affect, oriented to self.  Turned frequently and positioned for comfort

## 2022-03-11 LAB
BACTERIA SPEC AEROBE CULT: NORMAL
BACTERIA SPEC AEROBE CULT: NORMAL
GLUCOSE BLDC GLUCOMTR-MCNC: 102 MG/DL (ref 70–130)
GLUCOSE BLDC GLUCOMTR-MCNC: 108 MG/DL (ref 70–130)
GLUCOSE BLDC GLUCOMTR-MCNC: 109 MG/DL (ref 70–130)
GLUCOSE BLDC GLUCOMTR-MCNC: 92 MG/DL (ref 70–130)
GLUCOSE BLDC GLUCOMTR-MCNC: 92 MG/DL (ref 70–130)
SARS-COV-2 RNA PNL SPEC NAA+PROBE: NOT DETECTED

## 2022-03-11 PROCEDURE — 99233 SBSQ HOSP IP/OBS HIGH 50: CPT | Performed by: FAMILY MEDICINE

## 2022-03-11 PROCEDURE — 25010000002 HEPARIN (PORCINE) PER 1000 UNITS: Performed by: HOSPITALIST

## 2022-03-11 PROCEDURE — 82962 GLUCOSE BLOOD TEST: CPT

## 2022-03-11 PROCEDURE — 25010000002 CEFTRIAXONE SODIUM-DEXTROSE 1-3.74 GM-%(50ML) RECONSTITUTED SOLUTION: Performed by: HOSPITALIST

## 2022-03-11 PROCEDURE — 87635 SARS-COV-2 COVID-19 AMP PRB: CPT | Performed by: INTERNAL MEDICINE

## 2022-03-11 RX ORDER — MORPHINE SULFATE 20 MG/5ML
5 SOLUTION ORAL
Qty: 32 ML | Refills: 0 | Status: SHIPPED | OUTPATIENT
Start: 2022-03-11 | End: 2022-03-14

## 2022-03-11 RX ORDER — LORAZEPAM 2 MG/ML
0.5 CONCENTRATE ORAL EVERY 6 HOURS PRN
Qty: 30 ML | Refills: 0 | Status: SHIPPED | OUTPATIENT
Start: 2022-03-11 | End: 2022-04-10

## 2022-03-11 RX ADMIN — CARVEDILOL 12.5 MG: 12.5 TABLET, FILM COATED ORAL at 17:33

## 2022-03-11 RX ADMIN — LACTULOSE 10 G: 20 SOLUTION ORAL at 08:50

## 2022-03-11 RX ADMIN — SODIUM BICARBONATE 650 MG TABLET 1300 MG: at 08:49

## 2022-03-11 RX ADMIN — CEFTRIAXONE 1 G: 1 INJECTION, SOLUTION INTRAVENOUS at 08:48

## 2022-03-11 RX ADMIN — PHENYTOIN 50 MG: 50 TABLET, CHEWABLE ORAL at 06:02

## 2022-03-11 RX ADMIN — HEPARIN SODIUM 5000 UNITS: 5000 INJECTION, SOLUTION INTRAVENOUS; SUBCUTANEOUS at 08:49

## 2022-03-11 RX ADMIN — SODIUM BICARBONATE: 84 INJECTION, SOLUTION INTRAVENOUS at 08:48

## 2022-03-11 RX ADMIN — PHENYTOIN 50 MG: 50 TABLET, CHEWABLE ORAL at 14:10

## 2022-03-11 RX ADMIN — SODIUM BICARBONATE 650 MG TABLET 1300 MG: at 17:33

## 2022-03-11 RX ADMIN — CARVEDILOL 12.5 MG: 12.5 TABLET, FILM COATED ORAL at 08:49

## 2022-03-11 RX ADMIN — ASPIRIN 325 MG ORAL TABLET 325 MG: 325 PILL ORAL at 08:49

## 2022-03-11 RX ADMIN — LEVOTHYROXINE SODIUM 112 MCG: 112 TABLET ORAL at 08:49

## 2022-03-11 RX ADMIN — SODIUM BICARBONATE 650 MG TABLET 1300 MG: at 12:59

## 2022-03-11 NOTE — PLAN OF CARE
Goal Outcome Evaluation:         No acute events overnight. Anticipating discharge Saturday with hospice. Patient kept comfortable with positioning. Tolerating diet

## 2022-03-11 NOTE — PLAN OF CARE
Spoke to Jillian, nurse with home hospice, via phone call. She confirmed that equipment has been delivered.  She requested covid testing prior to discharge.  Jillian updated that comfort meds were ordered and that son was requesting to come and .  Nurse needs to be notified when pt is being discharged home at [phone] (request on call nurse, Brooks).  Please update nurse regarding covid results and how or who transported medications.    1715    Called retail pharmacy re pt's prescribed pain and anxiety medication.  Per staff, pt's  picked up pain and anxiety medication directly from pharmacy.  Hospice will need to be updated, regarding family picked up medication, when called at pt's discharge.

## 2022-03-11 NOTE — PROGRESS NOTES
"    [facility]    Name:  Phyllis Chen   Age:  71 y.o.  Sex:  female  :  1950  MRN:  [id number]   Visit Number:  [id number]  Admission Date:  3/6/2022  Date Of Service:  22  Primary Care Physician:  Hoa Del Rosario APRN     LOS: 5 days :    Chief Complaint:      Altered mental status    Subjective:    Patient seen and examined at bedside today.  Chart reviewed and events noted.  Patient laying comfortably in bed with no distress.  Patient continues to be pleasantly confused and disoriented.  Patient was able to recognize me as her doctor when I entered the room however continues to be slow on verbal responses and mentally.  Patient appears comfortable with no distress or pain.  Nursing at bedside.  No other acute events overnight.      Hospital Course:    70 yo F with HTN, Morbid obesity, Seizure disorder, Morbid obesity, Chronic bilateral lower extremity lymphedema that presented to the hospital with altered mental status. CT head without contrast has shown low attenuation lesion in the inferior right frontal lobe may represent encephalomalacia. CT chest without contrast is with no acute findings in the chest to account for patient's symptoms.  CT abdomen without contrast is concerning for obstructing mass in the pelvis causing severe bilateral hydronephrosis.  Underlying cervical malignancy is the diagnosis of exclusion. Patient found to be in acute renal failure with hyperkalemia.     In the ED, gynecology Dr. Camacho discussed the case with ED physician: \"Given the size of the mass and the obstructive component it was felt that the tumor would not be appropriate for surgical intervention.  We will consult on the patient to help with planning and or tissue diagnosis.  Patient would likely require bilateral percutaneous nephrostomy tubes to relieve obstruction.  Will consult urology on inpatient basis.  Attempted possible transfer patient there are no " "beds at any of the tertiary care centers with an appropriate referral distancing.  Multiple discussion with the patient's son at bedside and over the phone.  Given the patient's very guarded and poor prognosis he was open to discussing goals of care and/or end-of-life care with palliative care/hospice.\" Nephrology consulted, urology consulted, hospice consulted, Gyn consulted.    Review of Systems:     All systems were reviewed and negative except as mentioned in subjective, assessment and plan.    Vital Signs:    Temp:  [96.3 °F (35.7 °C)-97.7 °F (36.5 °C)] 97.6 °F (36.4 °C)  Heart Rate:  [68-75] 69  Resp:  [16-18] 18  BP: (118-153)/(58-82) 139/69    Intake and output:    I/O last 3 completed shifts:  In: 2720 [P.O.:1520; I.V.:1200]  Out: -   No intake/output data recorded.    Physical Examination:    General Appearance:   Awake and alert.  Chronically ill.   Head:  Atraumatic and normocephalic.   Eyes: Conjunctivae and sclerae normal, no icterus. No pallor.   Throat: No oral lesions, no thrush, oral mucosa moist.   Neck: Supple, trachea midline, no thyromegaly.   Lungs:   Breath sounds heard bilaterally equally.  No wheezing or crackles. No Pleural rub or bronchial breathing.   Heart:  Normal S1 and S2, no murmur, no gallop, no rub. No JVD.   Abdomen:   Normal bowel sounds, no masses, no organomegaly. Soft, nontender, nondistended, no rebound tenderness.  Obese.   Extremities: Supple, no edema, no cyanosis, no clubbing.   Skin: No bleeding or rash.   Neurologic:  Awake and alert but confused and disoriented. No facial asymmetry. No tremors.  Moving all 4 extremities with no difficulty.     Laboratory results:    Results from last 7 days   Lab Units 03/10/22  0659 03/09/22  1026 03/08/22  0607 03/07/22  0246 03/06/22  1752   SODIUM mmol/L 147* 154* 154*   < > 140   POTASSIUM mmol/L 3.6 3.8 4.2   < > 8.0*   CHLORIDE mmol/L 100 106 109*   < > 107   CO2 mmol/L 23.5 22.8 19.9*   < > 5.1*   BUN mg/dL 143* 151* 150*   < " > 163*   CREATININE mg/dL 9.47* 9.90* 9.18*   < > 9.27*   CALCIUM mg/dL 7.8* 8.3* 8.4*   < > 9.9   BILIRUBIN mg/dL  --  0.3 0.3  --  0.5   ALK PHOS U/L  --  137* 133*  --  147*   ALT (SGPT) U/L  --  32 42*  --  43*   AST (SGOT) U/L  --  26 38*  --  42*   GLUCOSE mg/dL 86 119* 133*   < > 136*    < > = values in this interval not displayed.     Results from last 7 days   Lab Units 03/10/22  0659 03/09/22  1026 03/08/22  0607   WBC 10*3/mm3 22.88* 23.90* 27.51*   HEMOGLOBIN g/dL 8.0* 8.0* 7.9*   HEMATOCRIT % 23.1* 23.1* 22.0*   PLATELETS 10*3/mm3 184 233 264         Results from last 7 days   Lab Units 03/06/22  1752   TROPONIN T ng/mL 0.255*     Results from last 7 days   Lab Units 03/06/22  1752   BLOODCX  No growth at 4 days  No growth at 4 days         I have reviewed the patient's laboratory results.    Radiology results:    US Carotid Bilateral    Result Date: 3/10/2022  PROCEDURE: US CAROTID BILATERAL-  HISTORY: Stroke; N17.9-Acute kidney failure, unspecified; E87.5-Hyperkalemia; G92.8-Other toxic encephalopathy; N85.8-Other specified noninflammatory disorders of uterus; N13.9-Obstructive and reflux uropathy, unspecified; N39.0-Urinary tract infection, site not specified; E72.20-Disorder of urea cycle metabolism, unspecified  Technique: Real-time imaging was performed of the extracranial carotid arteries in transverse and longitudinal planes, with color duplex evaluation of blood flow velocity. Spectral analysis was performed. The cervicovertebral arteries were also examined. Stenosis evaluation is based on validated velocity criteria.  Right carotid system: CCA: 73 cm/s ICA: 85 cm/s ECA: 77 cm/s Vertebral artery: Antegrade ICA/CCA ratio: 1.2 No visible plaque is identified at the bifurcation.  Left carotid system: CCA: 77 cm/s ICA: 100 cm/s ECA: 79 cm/s Vertebral artery: Antegrade ICA/CCA ratio: 1.3 No visible plaque is identified at the bifurcation.       Impression: Normal carotid artery duplex.  This report  was signed and finalized on 3/10/2022 8:30 AM by Pauline Pearce M.D..    I have reviewed the patient's radiology reports.    Medication Review:     I have reviewed the patient's active and prn medications.     Problem List:      Metabolic encephalopathy    Acute renal failure (HCC)    Other hydronephrosis    Pelvic mass    Hyperkalemia    Hyperammonemia (HCC)      Assessment:    1. Acute Metabolic Encephalopathy, POA  2. Acute Posterior Left Frontal Lobe Stroke, POA  3. Acute Renal Failure, POA  4. Severe, Bilateral Hydronephrosis, POA  5. Obstructing Pelvic Mass, POA  6. Hyperkalemia, POA  7. Hyperammonemia, POA  8. Hyperphosphatemia, POA  9. Elevated Troponin, POA  10. Essential Hypertension  11. Hyperlipidemia  12. Seizure Disorder  13. Hypothyroidism  14. Chronic Bilateral Lower Extremity Lymphedema  15. Morbid Obesity, BMI 39    Plan:    Acute encephalopathy  Acute left posterior frontal lobe stroke  -Patient encephalopathy continues to remain the same.  -MRI revealed acute posterior left frontal lobe stroke  -Neurology has been consulted.  No intervention at this time.  -Continue aspirin and statin.  -Echocardiogram showed EF 65 to 70%.  Mild mitral regurgitation and Negative bubble study  -Normal carotid artery duplex.  -Cont telemetry    Seizure disorder  -On low-dose phenytoin.    Acute renal failure  Hyperkalemia  Obstructing pelvic mass  Severe, Bilateral Hydronephrosis  -Nephrology consulted, recommendations and assistance greatly appreciated  -Medically treating hyperkalemia at this time.  -Plan to continue fluids until discharge.  -Urology consulted for possible urostomy tube placement and hicks catheter placement.  Procedures were discussed with the patient's son by urology.  Family does not want the procedure and have decided on home with hospice.  - On IVF     Hyperphosphatemia  Hyperammonemia  Hyperkalemia  -Treat medically as above    -Patient has a very, very poor prognosis. Family has been  talked to regarding the complexity of her multisystem organ failure.   -Dr. Alegria consulted. Assistance greatly appreciated.    -Family has decided on home with hospice and they did not want any further interventions or procedures.  Referral was sent for home with hospice.  Working on getting equipment to home.  Medicines ordered today to supply over the weekend.  Patient will need a Covid test before discharge.  Possible discharge with home with hospice in a.m.     DVT Prophylaxis: Heparin  Code Status: DNR/DNI  Diet: Renal puréed diet  Discharge Plan: Home with hospice, referral sent, pending equipment set up at home.    Jose Luis Escoto MD  03/11/22  07:57 EST    Dictated utilizing Dragon dictation.

## 2022-03-12 VITALS
WEIGHT: 228.84 LBS | DIASTOLIC BLOOD PRESSURE: 51 MMHG | RESPIRATION RATE: 18 BRPM | OXYGEN SATURATION: 95 % | HEART RATE: 68 BPM | BODY MASS INDEX: 39.07 KG/M2 | SYSTOLIC BLOOD PRESSURE: 152 MMHG | HEIGHT: 64 IN | TEMPERATURE: 99.5 F

## 2022-03-12 PROCEDURE — 99238 HOSP IP/OBS DSCHRG MGMT 30/<: CPT | Performed by: FAMILY MEDICINE

## 2022-03-12 RX ORDER — ATORVASTATIN CALCIUM 80 MG/1
80 TABLET, FILM COATED ORAL NIGHTLY
Qty: 30 TABLET | Refills: 0 | Status: SHIPPED | OUTPATIENT
Start: 2022-03-12 | End: 2022-04-11

## 2022-03-12 RX ORDER — PHENYTOIN 50 MG/1
50 TABLET, CHEWABLE ORAL EVERY 8 HOURS SCHEDULED
Qty: 90 TABLET | Refills: 0 | Status: SHIPPED | OUTPATIENT
Start: 2022-03-12 | End: 2022-04-11

## 2022-03-12 RX ORDER — ASPIRIN 81 MG/1
81 TABLET ORAL DAILY
Qty: 30 TABLET | Refills: 0 | Status: SHIPPED | OUTPATIENT
Start: 2022-03-12 | End: 2022-04-11

## 2022-03-12 NOTE — PLAN OF CARE
"Goal Outcome Evaluation:      Kept comfortable with preferred positioning, warm blankets and foods/drinks as requested. Patient not wanting any meds tonight, just \"wantin to go to sleep.\" Wishes respected. Planned discharge home with hospice in a.m.            "

## 2022-03-12 NOTE — DISCHARGE SUMMARY
[facility]   DISCHARGE SUMMARY      Name:  Phyllis Chen   Age:  71 y.o.  Sex:  female  :  1950  MRN:  [id number]   Visit Number:  [id number]    Admission Date:  3/6/2022  Date of Discharge:  3/12/2022  Primary Care Physician:  Hoa Del Rosario APRN    Important issues to note:    -Patient was admitted for acute metabolic encephalopathy and was diagnosed with acute posterior left frontal lobe stroke.  Neurology was consulted and recommended no intervention and to continue aspirin and statin.    -Patient was also diagnosed with obstructing pelvic mass, likely malignancy/metastatic disease.  Family were aware of the diagnosis from previously.  Patient wishes was not to get it treated in the past.    -Patient also had acute renal failure, bilateral hydronephrosis and hyperkalemia.  Multispecialists were consulted including GYN, neurology, nephrology, urology and palliative team.  Lengthy discussions with the family and the son (who is her POA) regarding care and treatment at this point.  Family decided on not doing procedures or interventions at this time and wanted to go home with hospice.    -Patient was discharged home with hospice.    Discharge Diagnoses:     1. Acute Metabolic Encephalopathy, POA  2. Acute Posterior Left Frontal Lobe Stroke, POA  3. Acute Renal Failure, POA  4. Severe, Bilateral Hydronephrosis, POA  5. Obstructing Pelvic Mass, POA  6. Hyperkalemia, POA  7. Hyperammonemia, POA  8. Hyperphosphatemia, POA  9. Elevated Troponin, POA  10. Essential Hypertension  11. Hyperlipidemia  12. Seizure Disorder  13. Hypothyroidism  14. Chronic Bilateral Lower Extremity Lymphedema  15. Morbid Obesity, BMI 39      Problem List:     Active Hospital Problems    Diagnosis  POA   • **Metabolic encephalopathy [G93.41]  Yes   • Acute renal failure (HCC) [N17.9]  Yes   • Other hydronephrosis [N13.39]  Yes   • Pelvic mass [R19.00]  Yes   • Hyperkalemia [E87.5]  Yes   •  "Hyperammonemia (HCC) [E72.20]  Yes      Resolved Hospital Problems   No resolved problems to display.     Presenting Problem:    Chief Complaint   Patient presents with   • Altered Mental Status     EMS stated pts family called concerned that pt was more altered than usual. Stated pt has lost the will to eat and or drink x3 days.       Consults:     Consulting Physician(s)  Chat With All Active Members    Provider Relationship Specialty    Katherine Alegria MD  Consulting Physician Internal Medicine    Doug, Dallin Duarte MD  Consulting Physician Gynecology    Michel David MD  Consulting Physician Urology        Procedures Performed:        History of presenting illness/Hospital Course:    70 yo F with HTN, Morbid obesity, Seizure disorder, Morbid obesity, Chronic bilateral lower extremity lymphedema that presented to the hospital with altered mental status. CT head without contrast has shown low attenuation lesion in the inferior right frontal lobe may represent encephalomalacia. CT chest without contrast is with no acute findings in the chest to account for patient's symptoms.  CT abdomen without contrast is concerning for obstructing mass in the pelvis causing severe bilateral hydronephrosis.  Underlying cervical malignancy is the diagnosis of exclusion. Patient found to be in acute renal failure with hyperkalemia.      In the ED, gynecology Dr. Camacho discussed the case with ED physician: \"Given the size of the mass and the obstructive component it was felt that the tumor would not be appropriate for surgical intervention.  Patient was admitted to the hospital with acute encephalopathy and was diagnosed with acute posterior left frontal lobe stroke.  Neurology was consulted and recommended no intervention and to continue aspirin and statin.  Patient was started on IV fluids and Rocephin.  Phenytoin was continued while in the hospital.  No seizure activity during hospitalization.  Patient was " recommended for nephrostomy tubes which was discussed with urology.  Family were aware of the pelvic mass diagnosis from previously and were aware of her very poor prognosis.  Patient wishes were not to get it treated in the past. Patient was in acute renal failure, bilateral hydronephrosis and hyperkalemia.  Multispecialists were consulted including GYN, neurology, nephrology, urology and palliative team.  Lengthy discussions with the family with multispecialists, hospitalist team and the son (who is her POA) regarding care and treatment at this point.  Family decided on not doing procedures or interventions or any aggressive treatment at this time and decided to keep the patient comfortable and wanted to go home with hospice.    Patient was seen and examined on day of discharge 3/12 in the morning.  Patient lying comfortably in bed with no distress or pain.  Patient remained pleasantly confused however was able to recognize me as her doctor again this morning.  No family was at bedside this morning.  Plan was for discharge home with hospice per family wishes today.  Equipment was delivered to the family along with medicines yesterday.  Hospice team is aware of patient going home today.  The son was updated and called and expecting patient to be transferred home today.  Hospice team will follow up at home per plan.    Vital Signs:    Temp:  [96.8 °F (36 °C)-97.9 °F (36.6 °C)] 97.9 °F (36.6 °C)  Heart Rate:  [62-71] 62  Resp:  [16-18] 18  BP: (117-137)/(59-86) 136/86    Physical Exam:    General Appearance:  Alert, pleasantly confused, chronically ill-appearing.   Head:  Atraumatic and normocephalic.   Eyes: Conjunctivae and sclerae normal, no icterus. No pallor.   Ears:  Ears with no abnormalities noted.   Throat: No oral lesions, no thrush, oral mucosa moist.   Neck: Supple, trachea midline, no thyromegaly.   Back:   No kyphoscoliosis present. No tenderness to palpation.   Lungs:   Breath sounds heard bilaterally  equally.  No crackles or wheezing. No Pleural rub or bronchial breathing.   Heart:  Normal S1 and S2, no murmur, no gallop, no rub. No JVD.   Abdomen:   Normal bowel sounds, no masses, no organomegaly. Soft, nontender, nondistended, no rebound tenderness.   Extremities: Supple, no edema, no cyanosis, no clubbing.   Pulses: Pulses palpable bilaterally.   Skin: No bleeding or rash.   Neurologic: Alert and oriented x1. No facial asymmetry. Moves all four limbs. No tremors.     Pertinent Lab Results:     Results from last 7 days   Lab Units 03/10/22  0659 03/09/22  1026 03/08/22  0607 03/07/22  0246 03/06/22  1752   SODIUM mmol/L 147* 154* 154*   < > 140   POTASSIUM mmol/L 3.6 3.8 4.2   < > 8.0*   CHLORIDE mmol/L 100 106 109*   < > 107   CO2 mmol/L 23.5 22.8 19.9*   < > 5.1*   BUN mg/dL 143* 151* 150*   < > 163*   CREATININE mg/dL 9.47* 9.90* 9.18*   < > 9.27*   CALCIUM mg/dL 7.8* 8.3* 8.4*   < > 9.9   BILIRUBIN mg/dL  --  0.3 0.3  --  0.5   ALK PHOS U/L  --  137* 133*  --  147*   ALT (SGPT) U/L  --  32 42*  --  43*   AST (SGOT) U/L  --  26 38*  --  42*   GLUCOSE mg/dL 86 119* 133*   < > 136*    < > = values in this interval not displayed.     Results from last 7 days   Lab Units 03/10/22  0659 03/09/22  1026 03/08/22  0607   WBC 10*3/mm3 22.88* 23.90* 27.51*   HEMOGLOBIN g/dL 8.0* 8.0* 7.9*   HEMATOCRIT % 23.1* 23.1* 22.0*   PLATELETS 10*3/mm3 184 233 264         Results from last 7 days   Lab Units 03/06/22  1752   TROPONIN T ng/mL 0.255*                     Results from last 7 days   Lab Units 03/06/22  1752   BLOODCX  No growth at 5 days  No growth at 5 days       Pertinent Radiology Results:    Imaging Results (All)     Procedure Component Value Units Date/Time    US Carotid Bilateral [[id number]] Collected: 03/10/22 0830     Updated: 03/10/22 0832    Narrative:      PROCEDURE: US CAROTID BILATERAL-     HISTORY: Stroke; N17.9-Acute kidney failure, unspecified;  E87.5-Hyperkalemia; G92.8-Other toxic encephalopathy;  N85.8-Other  specified noninflammatory disorders of uterus; N13.9-Obstructive and  reflux uropathy, unspecified; N39.0-Urinary tract infection, site not  specified; E72.20-Disorder of urea cycle metabolism, unspecified     Technique: Real-time imaging was performed of the extracranial carotid  arteries in transverse and longitudinal planes, with color duplex  evaluation of blood flow velocity. Spectral analysis was performed. The  cervicovertebral arteries were also examined. Stenosis evaluation is  based on validated velocity criteria.     Right carotid system:  CCA: 73 cm/s  ICA: 85 cm/s  ECA: 77 cm/s  Vertebral artery: Antegrade  ICA/CCA ratio: 1.2  No visible plaque is identified at the bifurcation.     Left carotid system:  CCA: 77 cm/s  ICA: 100 cm/s  ECA: 79 cm/s  Vertebral artery: Antegrade  ICA/CCA ratio: 1.3  No visible plaque is identified at the bifurcation.          Impression:      Normal carotid artery duplex.     This report was signed and finalized on 3/10/2022 8:30 AM by Pauline Pearce M.D..    XR Chest 1 View [[id number]] Collected: 03/07/22 0816     Updated: 03/07/22 0818    Narrative:      PROCEDURE: XR CHEST 1 VW-     HISTORY: Weak/Dizzy/AMS triage protocol     COMPARISON: None.     FINDINGS: The heart is normal in size. The mediastinum is unremarkable.  The lungs are clear. There is no pneumothorax.  There are no acute  osseous abnormalities.       Impression:      No acute cardiopulmonary process.     Continued followup is recommended.     This report was signed and finalized on 3/7/2022 8:16 AM by Pauline Pearce M.D..    MRI Brain Without Contrast [[id number]] Collected: 03/07/22 0814     Updated: 03/07/22 0818    Narrative:      PROCEDURE: MRI BRAIN WO CONTRAST-     HISTORY: Transient ischemic attack (TIA); N17.9-Acute kidney failure,  unspecified; E87.5-Hyperkalemia; G92.8-Other toxic encephalopathy;  N85.8-Other specified noninflammatory disorders of uterus;  N13.9-Obstructive and  reflux uropathy, unspecified; N39.0-Urinary tract  infection, site not specified; E72.20-Disorder of urea cycle metabolism,  unspecified     PROCEDURE: Multiplanar multisequence imaging of the brain was performed  without the use of intravenous contrast.     COMPARISON: None.     FINDINGS: There is generalized cerebral volume loss. There are FLAIR  hyperintensities in the periventricular white matter of both cerebral  hemispheres consistent with chronic small vessel ischemic change. There  is a punctate area of restricted diffusion in the posterior left frontal  lobe consistent with an acute infarct. There is no mass, mass effect or  midline shift. There is no hydrocephalus.     The midbrain, edgardo, cerebellum and craniocervical junction are  unremarkable. The sella and pituitary gland are within normal limits.  The major intracranial vasculature demonstrates the expected flow  related signal. The paranasal sinuses are clear.       Impression:      Punctate area of restricted diffusion in the posterior left  frontal lobe consistent with an acute infarct.           This report was signed and finalized on 3/7/2022 8:16 AM by Pauline Pearce M.D..    CT Abdomen Pelvis Without Contrast [[id number]] Collected: 03/06/22 2147     Updated: 03/06/22 2148    Narrative:      FINAL REPORT    TECHNIQUE:  Axial CT images were performed from the lung bases through the  symphysis pubis without IV contrast.  This study was performed  with techniques to keep radiation doses as low as reasonably  achievable (ALARA). Individualized dose reduction techniques  using automated exposure control or adjustment of mA and/or kV  according to the patient's size were employed.    CLINICAL HISTORY:  sepsis    FINDINGS:  Lack of intravenous contrast limits evaluation of solid  abdominal and pelvic organs.  ABDOMEN/PELVIS:  Liver,  gallbladder and bile ducts: The unenhanced liver is grossly  unremarkable without focal abnormality.  There has been  a prior  cholecystectomy.  There is no definite biliary duct dilatation.  Adrenal glands: The adrenal glands are morphologically  unremarkable without suspicious lesion.  Kidneys, ureter and  urinary bladder: There is severe bilateral hydronephrosis to the  level of the urinary bladder trigone.  There appears to be an  ill-defined infiltrating lesion probably arising from the  uterine cervix.  There is urinary bladder wall thickening.  Spleen: The spleen is normal size.  Pancreas: The pancreas is  grossly unremarkable.  GI systems and mesentery: No evidence of  bowel obstruction.  The appendix is visualized and unremarkable  in appearance.  No significant mesenteric inflammation.  Lymph  nodes: No definite pathologically enlarged abdominal or pelvic  lymph nodes present within the limits of this noncontrast  enhanced exam.  Vessels: The abdominal aorta is normal in  caliber.  The inferior vena cava is unremarkable.  Peritoneum:  No free intraperitoneal fluid or pneumoperitoneum.  Pelvic  viscera: Refer to above.    Body wall: No body wall contusion.  Bones: No acute fracture.      Impression:      Findings are concerning for an obstructing mass in the pelvis  causing severe bilateral hydronephrosis.  Underlying cervical  malignancy is the diagnosis of exclusion.    Authenticated by Reuben Farrell MD on 03/06/2022 09:47:12 PM    CT Chest Without Contrast Diagnostic [[id number]] Collected: 03/06/22 2147     Updated: 03/06/22 2148    Narrative:      FINAL REPORT    TECHNIQUE:  Axial CT images were obtained from the lung apex to the mid  abdomen.  Coronal and sagittal reformatted images generated from  the axial data set and provided for interpretation. This study  was performed with techniques to keep radiation doses as low as  reasonably achievable (ALARA). Individualized dose reduction  techniques using automated exposure control or adjustment of mA  and/or kV according to the patient's size were employed.    CLINICAL  HISTORY:  sepsis    FINDINGS:  Chest:  Lungs/Pleura:  There is basal atelectasis.  Vessels:  The aorta and main pulmonary artery are normal in caliber.  Lymph nodes:  No pathologically enlarged thoracic lymph nodes.  Chest Wall:  No chest wall contusion.  Bones:  No acute  fracture.      Impression:      No acute findings in the chest to account for the patient's  symptoms.    Authenticated by Reuben Farrell MD on 03/06/2022 09:47:13 PM    CT Head Without Contrast [[id number]] Collected: 03/06/22 1900     Updated: 03/06/22 1901    Narrative:      FINAL REPORT    TECHNIQUE:  Multiple axial CT images were performed from the foramen magnum  to the vertex. This study was performed with techniques to keep  radiation doses as low as reasonably achievable (ALARA).  Individualized dose reduction techniques using automated  exposure control or adjustment of mA and/or kV according to the  patient's size were employed.    CLINICAL HISTORY:  AMS    FINDINGS:  There is a low-attenuation lesion present within the inferior  right frontal lobe.  No acute intracranial hemorrhage or large  acute cortical infarct.  The brain volume is normal for  patient's age.  Ventricles are normal in size and configuration.  No midline shift.  The basal cisterns are patent.  No skull  fracture.  The visualized paranasal sinuses and mastoid air  cells are clear.      Impression:      Low attenuation lesion in the inferior right frontal lobe may  represent encephalomalacia.  This can be further evaluated with  MRI if clinically warranted..    Authenticated by Reuben Farrell MD on 03/06/2022 07:00:59 PM          Echo:    Results for orders placed during the hospital encounter of 03/06/22    Adult Transthoracic Echo Complete W/ Cont if Necessary Per Protocol    Interpretation Summary  1.  Normal left ventricular size and systolic function, LVEF 65-70%.  2.  Indeterminate LV diastolic filling pattern.  3.  Normal right ventricular size and systolic  function.  4.  Mild mitral regurgitation.  5.  Negative bubble study with no evidence of intracardiac or intrapulmonary shunt.    Condition on Discharge:      Stable.    Code status during the hospital stay:    Code Status and Medical Interventions:   Ordered at: 03/07/22 0208     Medical Intervention Limits:    NO intubation (DNI)    Other     Level Of Support Discussed With:    Health Care Surrogate     Code Status (Patient has no pulse and is not breathing):    No CPR (Do Not Attempt to Resuscitate)     Medical Interventions (Patient has pulse or is breathing):    Limited Support     Comments:    DISCUSSED WITH PATIENTS SON     Additional Medical Interventions Limits:    NO CPR     Discharge Disposition:    Hospice/Home    Discharge Medications:       Discharge Medications      New Medications      Instructions Start Date   aspirin 81 MG EC tablet   81 mg, Oral, Daily      atorvastatin 80 MG tablet  Commonly known as: LIPITOR   80 mg, Oral, Nightly      LORazepam 2 MG/ML concentrated solution  Commonly known as: LORazepam Intensol   Take 0.25 mL by mouth Every 6 (Six) Hours As Needed for Anxiety      morphine 20 MG/5ML solution   5.2 mg, Oral, Every 3 Hours PRN         Changes to Medications      Instructions Start Date   phenytoin 50 MG chewable tablet  Commonly known as: DILANTIN  What changed: Another medication with the same name was added. Make sure you understand how and when to take each.   3 Times Daily      phenytoin 50 MG chewable tablet  Commonly known as: DILANTIN  What changed: You were already taking a medication with the same name, and this prescription was added. Make sure you understand how and when to take each.   50 mg, Oral, Every 8 Hours Scheduled         Continue These Medications      Instructions Start Date   carvedilol 12.5 MG tablet  Commonly known as: COREG   6.25 mg, Oral, 2 Times Daily With Meals      famotidine 20 MG tablet  Commonly known as: PEPCID   20 mg, Oral, 2 Times Daily       ferrous sulfate 325 (65 FE) MG tablet   325 mg, Oral, Daily With Breakfast      furosemide 20 MG tablet  Commonly known as: LASIX   40 mg, Oral, Daily      levothyroxine 112 MCG tablet  Commonly known as: SYNTHROID, LEVOTHROID   112 mcg, Oral, Daily      loratadine 10 MG tablet  Commonly known as: CLARITIN   10 mg, Oral, Daily      losartan 100 MG tablet  Commonly known as: COZAAR   100 mg, Oral, Daily      MULTI-VITAMIN/IRON PO   1 tablet, Oral, Daily      vitamin D3 125 MCG (5000 UT) capsule capsule   Daily      cholecalciferol 25 MCG (1000 UT) tablet  Commonly known as: VITAMIN D3   2,000 Units, Oral, Daily         Stop These Medications    phenytoin  MG capsule  Commonly known as: DILANTIN     potassium chloride 20 MEQ CR tablet  Commonly known as: K-DUR,KLOR-CON          Discharge Diet:   Renal puréed diet    Activity at Discharge:   As tolerated    Follow-up Appointments:     Follow-up Information     Hoa Del Rosario APRN .    Specialty: Family Medicine  Contact information:  [address] [place] [id number] [phone]                       No future appointments.  Test Results Pending at Discharge:           Jos eLuis Escoto MD  03/12/22  11:33 EST    Time: I spent 27 minutes on this discharge activity which included: face-to-face encounter with the patient, reviewing the data in the system, coordination of the care with the nursing staff as well as consultants, documentation, and entering orders.     Dictated utilizing Dragon dictation.

## 2022-03-12 NOTE — PLAN OF CARE
Goal Outcome Evaluation:  Plan of Care Reviewed With: patient, spouse, son            Stable for discharge per MD. Home with Hospice via [facility].